# Patient Record
Sex: FEMALE | Race: WHITE | NOT HISPANIC OR LATINO | Employment: FULL TIME | URBAN - METROPOLITAN AREA
[De-identification: names, ages, dates, MRNs, and addresses within clinical notes are randomized per-mention and may not be internally consistent; named-entity substitution may affect disease eponyms.]

---

## 2017-01-07 ENCOUNTER — RADIANT APPOINTMENT (OUTPATIENT)
Dept: MAMMOGRAPHY | Facility: CLINIC | Age: 47
End: 2017-01-07
Attending: PHYSICIAN ASSISTANT
Payer: COMMERCIAL

## 2017-01-07 DIAGNOSIS — Z12.31 ENCOUNTER FOR SCREENING MAMMOGRAM FOR BREAST CANCER: ICD-10-CM

## 2017-01-07 PROCEDURE — G0202 SCR MAMMO BI INCL CAD: HCPCS | Mod: TC

## 2017-10-25 ENCOUNTER — TELEPHONE (OUTPATIENT)
Dept: FAMILY MEDICINE | Facility: CLINIC | Age: 47
End: 2017-10-25

## 2017-10-25 ASSESSMENT — PATIENT HEALTH QUESTIONNAIRE - PHQ9
5. POOR APPETITE OR OVEREATING: NOT AT ALL
SUM OF ALL RESPONSES TO PHQ QUESTIONS 1-9: 0

## 2017-10-25 ASSESSMENT — ANXIETY QUESTIONNAIRES
2. NOT BEING ABLE TO STOP OR CONTROL WORRYING: NOT AT ALL
1. FEELING NERVOUS, ANXIOUS, OR ON EDGE: NOT AT ALL
3. WORRYING TOO MUCH ABOUT DIFFERENT THINGS: NOT AT ALL
IF YOU CHECKED OFF ANY PROBLEMS ON THIS QUESTIONNAIRE, HOW DIFFICULT HAVE THESE PROBLEMS MADE IT FOR YOU TO DO YOUR WORK, TAKE CARE OF THINGS AT HOME, OR GET ALONG WITH OTHER PEOPLE: NOT DIFFICULT AT ALL
6. BECOMING EASILY ANNOYED OR IRRITABLE: NOT AT ALL
5. BEING SO RESTLESS THAT IT IS HARD TO SIT STILL: NOT AT ALL
7. FEELING AFRAID AS IF SOMETHING AWFUL MIGHT HAPPEN: NOT AT ALL
GAD7 TOTAL SCORE: 0

## 2017-10-26 ASSESSMENT — ANXIETY QUESTIONNAIRES: GAD7 TOTAL SCORE: 0

## 2018-03-02 ENCOUNTER — OFFICE VISIT (OUTPATIENT)
Dept: URGENT CARE | Facility: URGENT CARE | Age: 48
End: 2018-03-02
Payer: COMMERCIAL

## 2018-03-02 VITALS
TEMPERATURE: 98.5 F | DIASTOLIC BLOOD PRESSURE: 83 MMHG | HEART RATE: 68 BPM | BODY MASS INDEX: 28.37 KG/M2 | SYSTOLIC BLOOD PRESSURE: 132 MMHG | OXYGEN SATURATION: 97 % | WEIGHT: 186.6 LBS

## 2018-03-02 DIAGNOSIS — J06.9 VIRAL URI: Primary | ICD-10-CM

## 2018-03-02 DIAGNOSIS — R07.0 THROAT PAIN: ICD-10-CM

## 2018-03-02 LAB
DEPRECATED S PYO AG THROAT QL EIA: NORMAL
SPECIMEN SOURCE: NORMAL

## 2018-03-02 PROCEDURE — 87081 CULTURE SCREEN ONLY: CPT | Performed by: FAMILY MEDICINE

## 2018-03-02 PROCEDURE — 87880 STREP A ASSAY W/OPTIC: CPT | Performed by: FAMILY MEDICINE

## 2018-03-02 PROCEDURE — 99213 OFFICE O/P EST LOW 20 MIN: CPT | Performed by: FAMILY MEDICINE

## 2018-03-02 NOTE — MR AVS SNAPSHOT
After Visit Summary   3/2/2018    Neeta Verde    MRN: [id number]           Patient Information     Date Of Birth          1970        Visit Information        Provider Department      3/2/2018 7:10 PM Kurt Bianchi MD [facility]        Today's Diagnoses     Viral URI    -  1    Throat pain          Care Instructions      Viral Upper Respiratory Illness (Adult)  You have a viral upper respiratory illness (URI), which is another term for the common cold. This illness is contagious during the first few days. It is spread through the air by coughing and sneezing. It may also be spread by direct contact (touching the sick person and then touching your own eyes, nose, or mouth). Frequent handwashing will decrease risk of spread. Most viral illnesses go away within 7 to 10 days with rest and simple home remedies. Sometimes the illness may last for several weeks. Antibiotics will not kill a virus, and they are generally not prescribed for this condition.    Home care    If symptoms are severe, rest at home for the first 2 to 3 days. When you resume activity, don't let yourself get too tired.    Avoid being exposed to cigarette smoke (yours or others ).    You may use acetaminophen or ibuprofen to control pain and fever, unless another medicine was prescribed. (Note: If you have chronic liver or kidney disease, have ever had a stomach ulcer or gastrointestinal bleeding, or are taking blood-thinning medicines, talk with your healthcare provider before using these medicines.) Aspirin should never be given to anyone under 18 years of age who is ill with a viral infection or fever. It may cause severe liver or brain damage.    Your appetite may be poor, so a light diet is fine. Avoid dehydration by drinking 6 to 8 glasses of fluids per day (water, soft drinks, juices, tea, or soup). Extra fluids will help loosen secretions in the nose and lungs.    Over-the-counter cold medicines will not  shorten the length of time you re sick, but they may be helpful for the following symptoms: cough, sore throat, and nasal and sinus congestion. (Note: Do not use decongestants if you have high blood pressure.)  Follow-up care  Follow up with your healthcare provider, or as advised.  When to seek medical advice  Call your healthcare provider right away if any of these occur:    Cough with lots of colored sputum (mucus)    Severe headache; face, neck, or ear pain    Difficulty swallowing due to throat pain    Fever of 100.4 F (38 C)  Call 911, or get immediate medical care  Call emergency services right away if any of these occur:    Chest pain, shortness of breath, wheezing, or difficulty breathing    Coughing up blood    Inability to swallow due to throat pain  Date Last Reviewed: 9/13/2015 2000-2017 [employer]. [address] [place] [address]. All rights reserved. This information is not intended as a substitute for professional medical care. Always follow your healthcare professional's instructions.                Follow-ups after your visit        Who to contact     If you have questions or need follow up information about today's clinic visit or your schedule please contact [facility] directly at [phone].  Normal or non-critical lab and imaging results will be communicated to you by MyChart, letter or phone within 4 business days after the clinic has received the results. If you do not hear from us within 7 days, please contact the clinic through MyChart or phone. If you have a critical or abnormal lab result, we will notify you by phone as soon as possible.  Submit refill requests through [employer] or call your pharmacy and they will forward the refill request to us. Please allow 3 business days for your refill to be completed.          Additional Information About Your Visit        [employer] Information     [employer] gives you secure access to your electronic health  record. If you see a primary care provider, you can also send messages to your care team and make appointments. If you have questions, please call your primary care clinic.  If you do not have a primary care provider, please call [phone] and they will assist you.        Care EveryWhere ID     This is your Care EveryWhere ID. This could be used by other organizations to access your [facility] medical records  [id number]        Your Vitals Were     Pulse Temperature Last Period Pulse Oximetry BMI (Body Mass Index)       68 98.5  F (36.9  C) (Tympanic) 07/29/2015 (Exact Date) 97% 28.37 kg/m2        Blood Pressure from Last 3 Encounters:   03/02/18 132/83   10/25/17 120/82   12/19/16 104/80    Weight from Last 3 Encounters:   03/02/18 186 lb 9.6 oz (84.6 kg)   10/25/17 192 lb 3.2 oz (87.2 kg)   12/19/16 181 lb (82.1 kg)              We Performed the Following     Beta strep group A culture     Strep, Rapid Screen        Primary Care Provider Office Phone # Fax #    Jinny Letty Oswald [phone] [address] [facility] [address]        Equal Access to Services     JOAQUINA MONDRAGON : Hadii aad ku hadasho Soomaali, waaxda luqadaha, qaybta kaalmada adeegyada, waxay mikein haybentleyn deven ji. So [facility] [phone].    ATENCIÓN: Si habla español, tiene a matamoros disposición servicios gratuitos de asistencia lingüística. Llame al [phone].    We comply with applicable federal civil rights laws and [place] laws. We do not discriminate on the basis of race, color, national origin, age, disability, sex, sexual orientation, or gender identity.            Thank you!     Thank you for choosing [facility]  for your care. Our goal is always to provide you with excellent care. Hearing back from our patients is one way we can continue to improve our services. Please take a few minutes to complete the written survey that you may receive in the mail after your visit with us. Thank  you!             Your Updated Medication List - Protect others around you: Learn how to safely use, store and throw away your medicines at www.disposemymeds.org.          This list is accurate as of 3/2/18  7:39 PM.  Always use your most recent med list.                   Brand Name Dispense Instructions for use Diagnosis    escitalopram 10 MG tablet    LEXAPRO    30 tablet    Take 20 mg by mouth daily

## 2018-03-03 LAB
BACTERIA SPEC CULT: NORMAL
SPECIMEN SOURCE: NORMAL

## 2018-03-03 NOTE — PROGRESS NOTES
SUBJECTIVE:                                                    Neeta Verde is a 48 year old female who presents to clinic today for the following health issues:    RESPIRATORY SYMPTOMS      Duration: 2.5 days    Description  nasal congestion, rhinorrhea, sore throat, cough and diarrhea    Severity: mild    Accompanying signs and symptoms: None    History (predisposing factors):  none    Precipitating or alleviating factors: None    Therapies tried and outcome:  none        Problem list and histories reviewed & adjusted, as indicated.  Additional history: as documented    Patient Active Problem List   Diagnosis     Mild major depression (H)     Psychosis, brief reactive     CARDIOVASCULAR SCREENING; LDL GOAL LESS THAN 160     ASCUS favor benign     Past Surgical History:   Procedure Laterality Date     C APPENDECTOMY  1978       Social History   Substance Use Topics     Smoking status: Never Smoker     Smokeless tobacco: Never Used     Alcohol use 8.4 oz/week     Family History   Problem Relation Age of Onset     Adopted: Yes     Other - See Comments Son      chron's     Unknown/Adopted No family hx of      Unknown family history          Current Outpatient Prescriptions   Medication Sig Dispense Refill     escitalopram (LEXAPRO) 10 MG tablet Take 20 mg by mouth daily  30 tablet 1     Allergies   Allergen Reactions     Azithromycin      Zithromax - Racing heart     Moxifloxacin Hydrochloride      Avelox - Labored breathing     Recent Labs   Lab Test  12/19/16   1041  07/10/12   0854   LDL  142*  94   HDL  73  64   TRIG  91  62   ALT  26   --    CR  0.86   --    GFRESTIMATED  70   --    GFRESTBLACK  85   --    POTASSIUM  4.4   --       BP Readings from Last 3 Encounters:   03/02/18 132/83   10/25/17 120/82   12/19/16 104/80    Wt Readings from Last 3 Encounters:   03/02/18 186 lb 9.6 oz (84.6 kg)   10/25/17 192 lb 3.2 oz (87.2 kg)   12/19/16 181 lb (82.1 kg)                  Labs reviewed in  EPIC    ROS:  Constitutional, HEENT, cardiovascular, pulmonary, gi and gu systems are negative, except as otherwise noted.    OBJECTIVE:     /83  Pulse 68  Temp 98.5  F (36.9  C) (Tympanic)  Wt 186 lb 9.6 oz (84.6 kg)  LMP 07/29/2015 (Exact Date)  SpO2 97%  BMI 28.37 kg/m2  Body mass index is 28.37 kg/(m^2).  GENERAL: healthy, alert and no distress  EYES: Eyes grossly normal to inspection, PERRL and conjunctivae and sclerae normal  HENT: normal cephalic/atraumatic, ear canals and TM's normal, oropharxnx crowded, uvula elongated and sinuses: not tender  NECK: no adenopathy, no asymmetry, masses, or scars and thyroid normal to palpation  RESP: lungs clear to auscultation - no rales, rhonchi or wheezes  CV: regular rates and rhythm, normal S1 S2, no S3 or S4 and no murmur, click or rub  MS: no gross musculoskeletal defects noted, no edema    Diagnostic Test Results:  Results for orders placed or performed in visit on 03/02/18 (from the past 24 hour(s))   Strep, Rapid Screen   Result Value Ref Range    Specimen Description Throat     Rapid Strep A Screen       NEGATIVE: No Group A streptococcal antigen detected by immunoassay, await culture report.       ASSESSMENT/PLAN:         ICD-10-CM    1. Viral URI J06.9     B97.89    2. Throat pain R07.0 Strep, Rapid Screen     Beta strep group A culture       Discussed in detail differentials and further management. Symptoms are likely secondary to viral infection. Recommended well hydration, over-the-counter analgesia, warm fluids and saline gargles. Written instructions/information provided. Patient understood and in agreement with the above plan. All questions are answered. Follow-up if symptoms persist or worsen.        Patient Instructions     Viral Upper Respiratory Illness (Adult)  You have a viral upper respiratory illness (URI), which is another term for the common cold. This illness is contagious during the first few days. It is spread through the air by  coughing and sneezing. It may also be spread by direct contact (touching the sick person and then touching your own eyes, nose, or mouth). Frequent handwashing will decrease risk of spread. Most viral illnesses go away within 7 to 10 days with rest and simple home remedies. Sometimes the illness may last for several weeks. Antibiotics will not kill a virus, and they are generally not prescribed for this condition.    Home care    If symptoms are severe, rest at home for the first 2 to 3 days. When you resume activity, don't let yourself get too tired.    Avoid being exposed to cigarette smoke (yours or others ).    You may use acetaminophen or ibuprofen to control pain and fever, unless another medicine was prescribed. (Note: If you have chronic liver or kidney disease, have ever had a stomach ulcer or gastrointestinal bleeding, or are taking blood-thinning medicines, talk with your healthcare provider before using these medicines.) Aspirin should never be given to anyone under 18 years of age who is ill with a viral infection or fever. It may cause severe liver or brain damage.    Your appetite may be poor, so a light diet is fine. Avoid dehydration by drinking 6 to 8 glasses of fluids per day (water, soft drinks, juices, tea, or soup). Extra fluids will help loosen secretions in the nose and lungs.    Over-the-counter cold medicines will not shorten the length of time you re sick, but they may be helpful for the following symptoms: cough, sore throat, and nasal and sinus congestion. (Note: Do not use decongestants if you have high blood pressure.)  Follow-up care  Follow up with your healthcare provider, or as advised.  When to seek medical advice  Call your healthcare provider right away if any of these occur:    Cough with lots of colored sputum (mucus)    Severe headache; face, neck, or ear pain    Difficulty swallowing due to throat pain    Fever of 100.4 F (38 C)  Call 911, or get immediate medical care  Call  emergency services right away if any of these occur:    Chest pain, shortness of breath, wheezing, or difficulty breathing    Coughing up blood    Inability to swallow due to throat pain  Date Last Reviewed: 9/13/2015 2000-2017 [employer]. [address] [place] [address], [place] [address]. All rights reserved. This information is not intended as a substitute for professional medical care. Always follow your healthcare professional's instructions.            Kurt Bianchi MD  [facility]

## 2018-03-03 NOTE — PATIENT INSTRUCTIONS

## 2018-09-10 ENCOUNTER — OFFICE VISIT (OUTPATIENT)
Dept: FAMILY MEDICINE | Facility: CLINIC | Age: 48
End: 2018-09-10
Payer: COMMERCIAL

## 2018-09-10 VITALS
HEIGHT: 68 IN | SYSTOLIC BLOOD PRESSURE: 138 MMHG | HEART RATE: 59 BPM | TEMPERATURE: 98 F | OXYGEN SATURATION: 99 % | WEIGHT: 183 LBS | RESPIRATION RATE: 16 BRPM | BODY MASS INDEX: 27.74 KG/M2 | DIASTOLIC BLOOD PRESSURE: 86 MMHG

## 2018-09-10 DIAGNOSIS — Z12.11 SPECIAL SCREENING FOR MALIGNANT NEOPLASMS, COLON: ICD-10-CM

## 2018-09-10 DIAGNOSIS — F32.0 MILD MAJOR DEPRESSION (H): ICD-10-CM

## 2018-09-10 DIAGNOSIS — Z23 NEED FOR PROPHYLACTIC VACCINATION AND INOCULATION AGAINST INFLUENZA: ICD-10-CM

## 2018-09-10 DIAGNOSIS — Z12.31 VISIT FOR SCREENING MAMMOGRAM: ICD-10-CM

## 2018-09-10 DIAGNOSIS — Z00.00 ROUTINE GENERAL MEDICAL EXAMINATION AT A HEALTH CARE FACILITY: Primary | ICD-10-CM

## 2018-09-10 DIAGNOSIS — Z12.4 SCREENING FOR MALIGNANT NEOPLASM OF CERVIX: ICD-10-CM

## 2018-09-10 LAB
CHOLEST SERPL-MCNC: 206 MG/DL
GLUCOSE SERPL-MCNC: 82 MG/DL (ref 70–99)
HDLC SERPL-MCNC: 85 MG/DL
LDLC SERPL CALC-MCNC: 109 MG/DL
NONHDLC SERPL-MCNC: 121 MG/DL
TRIGL SERPL-MCNC: 62 MG/DL

## 2018-09-10 PROCEDURE — 36415 COLL VENOUS BLD VENIPUNCTURE: CPT | Performed by: PHYSICIAN ASSISTANT

## 2018-09-10 PROCEDURE — 90471 IMMUNIZATION ADMIN: CPT | Performed by: PHYSICIAN ASSISTANT

## 2018-09-10 PROCEDURE — 82947 ASSAY GLUCOSE BLOOD QUANT: CPT | Performed by: PHYSICIAN ASSISTANT

## 2018-09-10 PROCEDURE — G0145 SCR C/V CYTO,THINLAYER,RESCR: HCPCS | Performed by: PHYSICIAN ASSISTANT

## 2018-09-10 PROCEDURE — 87624 HPV HI-RISK TYP POOLED RSLT: CPT | Performed by: PHYSICIAN ASSISTANT

## 2018-09-10 PROCEDURE — 90686 IIV4 VACC NO PRSV 0.5 ML IM: CPT | Performed by: PHYSICIAN ASSISTANT

## 2018-09-10 PROCEDURE — 80061 LIPID PANEL: CPT | Performed by: PHYSICIAN ASSISTANT

## 2018-09-10 PROCEDURE — 99396 PREV VISIT EST AGE 40-64: CPT | Mod: 25 | Performed by: PHYSICIAN ASSISTANT

## 2018-09-10 RX ORDER — ESCITALOPRAM OXALATE 20 MG/1
TABLET ORAL
Refills: 3 | COMMUNITY
Start: 2018-09-04 | End: 2022-01-25

## 2018-09-10 ASSESSMENT — PAIN SCALES - GENERAL: PAINLEVEL: NO PAIN (0)

## 2018-09-10 NOTE — MR AVS SNAPSHOT
After Visit Summary   9/10/2018    Neeta Verde    MRN: [id number]           Patient Information     Date Of Birth          1970        Visit Information        Provider Department      9/10/2018 4:20 PM Kehr, Kristen M, PA-C [facility]        Today's Diagnoses     Routine general medical examination at a health care facility    -  1    Special screening for malignant neoplasms, colon        Visit for screening mammogram        Screening for malignant neoplasm of cervix          Care Instructions      Preventive Health Recommendations  Female Ages 50 - 64    Yearly exam: See your health care provider every year in order to  o Review health changes.   o Discuss preventive care.    o Review your medicines if your doctor has prescribed any.      Get a Pap test every three years (unless you have an abnormal result and your provider advises testing more often).    If you get Pap tests with HPV test, you only need to test every 5 years, unless you have an abnormal result.     You do not need a Pap test if your uterus was removed (hysterectomy) and you have not had cancer.    You should be tested each year for STDs (sexually transmitted diseases) if you're at risk.     Have a mammogram every 1 to 2 years.    Have a colonoscopy at age 50, or have a yearly FIT test (stool test). These exams screen for colon cancer.      Have a cholesterol test every 5 years, or more often if advised.    Have a diabetes test (fasting glucose) every three years. If you are at risk for diabetes, you should have this test more often.     If you are at risk for osteoporosis (brittle bone disease), think about having a bone density scan (DEXA).    Shots: Get a flu shot each year. Get a tetanus shot every 10 years.    Nutrition:     Eat at least 5 servings of fruits and vegetables each day.    Eat whole-grain bread, whole-wheat pasta and brown rice instead of white grains and rice.    Get adequate Calcium and  Vitamin D.     Lifestyle    Exercise at least 150 minutes a week (30 minutes a day, 5 days a week). This will help you control your weight and prevent disease.    Limit alcohol to one drink per day.    No smoking.     Wear sunscreen to prevent skin cancer.     See your dentist every six months for an exam and cleaning.    See your eye doctor every 1 to 2 years.            Follow-ups after your visit        Additional Services     GASTROENTEROLOGY ADULT REF PROCEDURE ONLY [facility] [phone]; [facility]       Last Lab Result: Creatinine (mg/dL)       Date                     Value                 12/19/2016               0.86             ----------  Body mass index is 28.24 kg/(m^2).      Patient will be contacted to schedule procedure.     Please be aware that coverage of these services is subject to the terms and limitations of your health insurance plan.  Call member services at your health plan with any benefit or coverage questions.  Any procedures must be performed at a [facility] facility OR coordinated by your clinic's referral office.    Please bring the following with you to your appointment:    (1) Any X-Rays, CTs or MRIs which have been performed.  Contact the facility where they were done to arrange for  prior to your scheduled appointment.    (2) List of current medications   (3) This referral request   (4) Any documents/labs given to you for this referral                  Future tests that were ordered for you today     Open Future Orders        Priority Expected Expires Ordered    *MA Screening Digital Bilateral Routine  9/10/2019 9/10/2018            Who to contact     If you have questions or need follow up information about today's clinic visit or your schedule please contact [facility] at [phone].  Normal or non-critical lab and imaging results will be communicated to you by MyChart, letter or phone within 4 business days after the clinic  "has received the results. If you do not hear from us within 7 days, please contact the clinic through [employer] or phone. If you have a critical or abnormal lab result, we will notify you by phone as soon as possible.  Submit refill requests through [employer] or call your pharmacy and they will forward the refill request to us. Please allow 3 business days for your refill to be completed.          Additional Information About Your Visit        [employer] Information     [employer] gives you secure access to your electronic health record. If you see a primary care provider, you can also send messages to your care team and make appointments. If you have questions, please call your primary care clinic.  If you do not have a primary care provider, please call [phone] and they will assist you.        Care EveryWhere ID     This is your Care EveryWhere ID. This could be used by other organizations to access your [facility] medical records  [id number]        Your Vitals Were     Pulse Temperature Respirations Height Last Period Pulse Oximetry    59 98  F (36.7  C) (Oral) 16 5' 7.5\" (1.715 m) 07/29/2015 (Exact Date) 99%    BMI (Body Mass Index)                   28.24 kg/m2            Blood Pressure from Last 3 Encounters:   09/10/18 138/86   03/02/18 132/83   10/25/17 120/82    Weight from Last 3 Encounters:   09/10/18 183 lb (83 kg)   03/02/18 186 lb 9.6 oz (84.6 kg)   10/25/17 192 lb 3.2 oz (87.2 kg)              We Performed the Following     GASTROENTEROLOGY ADULT REF PROCEDURE ONLY [facility] [phone]; [facility]     Glucose     HPV High Risk Types DNA Cervical     Lipid panel reflex to direct LDL Fasting     Pap imaged thin layer screen with HPV - recommended age 30 - 65          Today's Medication Changes          These changes are accurate as of 9/10/18  4:51 PM.  If you have any questions, ask your nurse or doctor.               These medicines have changed or have updated prescriptions.        " Dose/Directions    escitalopram 20 MG tablet   Commonly known as:  LEXAPRO   This may have changed:  Another medication with the same name was removed. Continue taking this medication, and follow the directions you see here.   Changed by:  Kehr, Kristen M, PA-C        Refills:  3                Primary Care Provider Office Phone # Fax #    Jinny Letty Oswald [phone] [address] [facility] [address]        Equal Access to Services     [facility]: Hadii aad ku hadasho Soomaali, waaxda luqadaha, qaybta kaalmada adeegyada, waxay idiin hayaan adeeg kharash la'aan . So [facility] [phone].    ATENCIÓN: Si habla español, tiene a matamoros disposición servicios gratuitos de asistencia lingüística. [facility] [phone].    We comply with applicable federal civil rights laws and [place] laws. We do not discriminate on the basis of race, color, national origin, age, disability, sex, sexual orientation, or gender identity.            Thank you!     Thank you for choosing [facility]  for your care. Our goal is always to provide you with excellent care. Hearing back from our patients is one way we can continue to improve our services. Please take a few minutes to complete the written survey that you may receive in the mail after your visit with us. Thank you!             Your Updated Medication List - Protect others around you: Learn how to safely use, store and throw away your medicines at www.disposemymeds.org.          This list is accurate as of 9/10/18  4:51 PM.  Always use your most recent med list.                   Brand Name Dispense Instructions for use Diagnosis    escitalopram 20 MG tablet    LEXAPRO

## 2018-09-10 NOTE — PROGRESS NOTES
SUBJECTIVE:   CC: Neeta Verde is an 48 year old woman who presents for preventive health visit.     Healthy Habits:    Do you get at least three servings of calcium containing foods daily (dairy, green leafy vegetables, etc.)? yes    Amount of exercise or daily activities, outside of work: 3 day(s) per week    Problems taking medications regularly No    Medication side effects: No    Have you had an eye exam in the past two years? yes    Do you see a dentist twice per year? yes    Do you have sleep apnea, excessive snoring or daytime drowsiness?          Today's PHQ-2 Score:   PHQ-2 ( 1999 Pfizer) 9/10/2018 10/25/2017   Q1: Little interest or pleasure in doing things 1 0   Q2: Feeling down, depressed or hopeless 0 0   PHQ-2 Score 1 0   Q1: Little interest or pleasure in doing things - Not at all   Q2: Feeling down, depressed or hopeless - Not at all   PHQ-2 Score - 0       Abuse: Current or Past(Physical, Sexual or Emotional)- No  Do you feel safe in your environment - Yes    Social History   Substance Use Topics     Smoking status: Never Smoker     Smokeless tobacco: Never Used     Alcohol use 8.4 oz/week     If you drink alcohol do you typically have >3 drinks per day or >7 drinks per week? No                     Reviewed orders with patient.  Reviewed health maintenance and updated orders accordingly - Yes  Labs reviewed in EPIC  BP Readings from Last 3 Encounters:   09/10/18 138/86   03/02/18 132/83   10/25/17 120/82    Wt Readings from Last 3 Encounters:   09/10/18 183 lb (83 kg)   03/02/18 186 lb 9.6 oz (84.6 kg)   10/25/17 192 lb 3.2 oz (87.2 kg)                  Patient Active Problem List   Diagnosis     Mild major depression (H)     Psychosis, brief reactive     CARDIOVASCULAR SCREENING; LDL GOAL LESS THAN 160     ASCUS favor benign     Past Surgical History:   Procedure Laterality Date     C APPENDECTOMY  1978       Social History   Substance Use Topics     Smoking status: Never Smoker     Smokeless  tobacco: Never Used     Alcohol use 8.4 oz/week     Family History   Problem Relation Age of Onset     Adopted: Yes     Other - See Comments Son      chron's     Crohn Disease Son      Allergy (Severe) Son      Unknown/Adopted No family hx of      Unknown family history          Current Outpatient Prescriptions   Medication Sig Dispense Refill     escitalopram (LEXAPRO) 20 MG tablet   3     Allergies   Allergen Reactions     Azithromycin      Zithromax - Racing heart     Moxifloxacin Hydrochloride      Avelox - Labored breathing       Patient under age 50, mutual decision reflected in health maintenance.      Pertinent mammograms are reviewed under the imaging tab.  History of abnormal Pap smear:  PAP / HPV Latest Ref Rng & Units 8/26/2015 7/10/2012   PAP - ASC-US(A) NIL   HPV 16 DNA NEG Negative -   HPV 18 DNA NEG Negative -   OTHER HR HPV NEG Negative -     Reviewed and updated as needed this visit by clinical staff  Tobacco  Allergies  Meds  Med Hx  Surg Hx  Fam Hx  Soc Hx        Reviewed and updated as needed this visit by Provider        Past Medical History:   Diagnosis Date     ASCUS favor benign 8/2015    Neg HPV     Depressive disorder       Past Surgical History:   Procedure Laterality Date     C APPENDECTOMY  1978       ROS:  CONSTITUTIONAL: NEGATIVE for fever, chills, change in weight  INTEGUMENTARY/SKIN: NEGATIVE for worrisome rashes, moles or lesions  EYES: NEGATIVE for vision changes or irritation  ENT: NEGATIVE for ear, mouth and throat problems  RESP: NEGATIVE for significant cough or SOB  BREAST: NEGATIVE for masses, tenderness or discharge  CV: NEGATIVE for chest pain, palpitations or peripheral edema  GI: NEGATIVE for nausea, abdominal pain, heartburn, or change in bowel habits  : NEGATIVE for unusual urinary or vaginal symptoms. No vaginal bleeding.  MUSCULOSKELETAL: NEGATIVE for significant arthralgias or myalgia  NEURO: NEGATIVE for weakness, dizziness or paresthesias  ENDOCRINE:  "NEGATIVE for temperature intolerance, skin/hair changes  PSYCHIATRIC: NEGATIVE for changes in mood or affect     OBJECTIVE:   /86  Pulse 59  Temp 98  F (36.7  C) (Oral)  Resp 16  Ht 5' 7.5\" (1.715 m)  Wt 183 lb (83 kg)  LMP 07/29/2015 (Exact Date)  SpO2 99%  BMI 28.24 kg/m2  EXAM:  GENERAL: healthy, alert and no distress  EYES: Eyes grossly normal to inspection, PERRL and conjunctivae and sclerae normal  HENT: ear canals and TM's normal, nose and mouth without ulcers or lesions  NECK: no adenopathy, no asymmetry, masses, or scars and thyroid normal to palpation  RESP: lungs clear to auscultation - no rales, rhonchi or wheezes  BREAST: normal without masses, tenderness or nipple discharge and no palpable axillary masses or adenopathy  CV: regular rate and rhythm, normal S1 S2, no S3 or S4, no murmur, click or rub, no peripheral edema and peripheral pulses strong  ABDOMEN: soft, nontender, no hepatosplenomegaly, no masses and bowel sounds normal   (female): normal female external genitalia, normal urethral meatus, vaginal mucosa pink, moist, well rugated, and normal cervix/adnexa/uterus without masses or discharge  MS: no gross musculoskeletal defects noted, no edema  SKIN: no suspicious lesions or rashes  NEURO: Normal strength and tone, mentation intact and speech normal  PSYCH: mentation appears normal, affect normal/bright    Diagnostic Test Results:  none     ASSESSMENT/PLAN:   1. Routine general medical examination at a health care facility  Health maintenance reviewed and updated.  - Glucose  - Lipid panel reflex to direct LDL Fasting    2. Special screening for malignant neoplasms, colon  Due for colon screening  - GASTROENTEROLOGY ADULT REF PROCEDURE ONLY [facility] [phone]; [facility]    3. Visit for screening mammogram  Scheduled mammogram  - *MA Screening Digital Bilateral; Future    4. Screening for malignant neoplasm of cervix  - Pap imaged thin layer screen with " "HPV - recommended age 30 - 65  - HPV High Risk Types DNA Cervical    5. Need for prophylactic vaccination and inoculation against influenza  - FLU VACCINE, SPLIT VIRUS, IM (QUADRIVALENT) [19837]- >3 YRS  - Vaccine Administration, Initial [45246]'    6. Mild major depression  Managed by Psychiatry and stable    COUNSELING:   Reviewed preventive health counseling, as reflected in patient instructions       Regular exercise       Healthy diet/nutrition       Colon cancer screening       (Patricia)menopause management    BP Readings from Last 1 Encounters:   09/10/18 138/86     Estimated body mass index is 28.24 kg/(m^2) as calculated from the following:    Height as of this encounter: 5' 7.5\" (1.715 m).    Weight as of this encounter: 183 lb (83 kg).    Weight management plan: Discussed healthy diet and exercise guidelines and patient will follow up in 12 months in clinic to re-evaluate.     reports that she has never smoked. She has never used smokeless tobacco.      Counseling Resources:  ATP IV Guidelines  Pooled Cohorts Equation Calculator  Breast Cancer Risk Calculator  FRAX Risk Assessment  ICSI Preventive Guidelines  Dietary Guidelines for Americans, 2010  USDA's MyPlate  ASA Prophylaxis  Lung CA Screening    Kristen M. Kehr, PA-C  [facility]    Injectable Influenza Immunization Documentation    1.  Is the person to be vaccinated sick today?   No    2. Does the person to be vaccinated have an allergy to a component   of the vaccine?   No  Egg Allergy Algorithm Link    3. Has the person to be vaccinated ever had a serious reaction   to influenza vaccine in the past?   No    4. Has the person to be vaccinated ever had Guillain-Barré syndrome?   No    Form completed by Ad DIOP MA           "

## 2018-09-10 NOTE — LETTER
My Depression Action Plan  Name: Neeta Verde   Date of Birth 1970  Date: 9/10/2018    My doctor: Jinny Oswald   My clinic: [facility]  [id number] [place] [id number] [phone]          GREEN    ZONE   Good Control    What it looks like:     Things are going generally well. You have normal up s and down s. You may even feel depressed from time to time, but bad moods usually last less than a day.   What you need to do:  1. Continue to care for yourself (see self care plan)  2. Check your depression survival kit and update it as needed  3. Follow your physician s recommendations including any medication.  4. Do not stop taking medication unless you consult with your physician first.           YELLOW         ZONE Getting Worse    What it looks like:     Depression is starting to interfere with your life.     It may be hard to get out of bed; you may be starting to isolate yourself from others.    Symptoms of depression are starting to last most all day and this has happened for several days.     You may have suicidal thoughts but they are not constant.   What you need to do:     1. Call your care team, your response to treatment will improve if you keep your care team informed of your progress. Yellow periods are signs an adjustment may need to be made.     2. Continue your self-care, even if you have to fake it!    3. Talk to someone in your support network    4. Open up your depression survival kit           RED    ZONE Medical Alert - Get Help    What it looks like:     Depression is seriously interfering with your life.     You may experience these or other symptoms: You can t get out of bed most days, can t work or engage in other necessary activities, you have trouble taking care of basic hygiene, or basic responsibilities, thoughts of suicide or death that will not go away, self-injurious behavior.     What you need to do:  1. Call your care team and  request a same-day appointment. If they are not available (weekends or after hours) call your local crisis line, emergency room or 911.            Depression Self Care Plan / Survival Kit    Self-Care for Depression  Here s the deal. Your body and mind are really not as separate as most people think.  What you do and think affects how you feel and how you feel influences what you do and think. This means if you do things that people who feel good do, it will help you feel better.  Sometimes this is all it takes.  There is also a place for medication and therapy depending on how severe your depression is, so be sure to consult with your medical provider and/ or Behavioral Health Consultant if your symptoms are worsening or not improving.     In order to better manage my stress, I will:    Exercise  Get some form of exercise, every day. This will help reduce pain and release endorphins, the  feel good  chemicals in your brain. This is almost as good as taking antidepressants!  This is not the same as joining a gym and then never going! (they count on that by the way ) It can be as simple as just going for a walk or doing some gardening, anything that will get you moving.      Hygiene   Maintain good hygiene (Get out of bed in the morning, Make your bed, Brush your teeth, Take a shower, and Get dressed like you were going to work, even if you are unemployed).  If your clothes don't fit try to get ones that do.    Diet  I will strive to eat foods that are good for me, drink plenty of water, and avoid excessive sugar, caffeine, alcohol, and other mood-altering substances.  Some foods that are helpful in depression are: complex carbohydrates, B vitamins, flaxseed, fish or fish oil, fresh fruits and vegetables.    Psychotherapy  I agree to participate in Individual Therapy (if recommended).    Medication  If prescribed medications, I agree to take them.  Missing doses can result in serious side effects.  I understand that  drinking alcohol, or other illicit drug use, may cause potential side effects.  I will not stop my medication abruptly without first discussing it with my provider.    Staying Connected With Others  I will stay in touch with my friends, family members, and my primary care provider/team.    Use your imagination  Be creative.  We all have a creative side; it doesn t matter if it s oil painting, sand castles, or mud pies! This will also kick up the endorphins.    Witness Beauty  (AKA stop and smell the roses) Take a look outside, even in mid-winter. Notice colors, textures. Watch the squirrels and birds.     Service to others  Be of service to others.  There is always someone else in need.  By helping others we can  get out of ourselves  and remember the really important things.  This also provides opportunities for practicing all the other parts of the program.    Humor  Laugh and be silly!  Adjust your TV habits for less news and crime-drama and more comedy.    Control your stress  Try breathing deep, massage therapy, biofeedback, and meditation. Find time to relax each day.     My support system    Clinic Contact:  Phone number:    Contact 1:  Phone number:    Contact 2:  Phone number:    Gnosticist/:  Phone number:    Therapist:  Phone number:    Local crisis center:    Phone number:    Other community support:  Phone number:

## 2018-09-10 NOTE — NURSING NOTE
"Chief Complaint   Patient presents with     Physical       Initial /86  Pulse 59  Temp 98  F (36.7  C) (Oral)  Resp 16  Ht 5' 7.5\" (1.715 m)  Wt 183 lb (83 kg)  LMP 07/29/2015 (Exact Date)  SpO2 99%  BMI 28.24 kg/m2 Estimated body mass index is 28.24 kg/(m^2) as calculated from the following:    Height as of this encounter: 5' 7.5\" (1.715 m).    Weight as of this encounter: 183 lb (83 kg).  Medication Reconciliation: complete    KIMBERLY Madison MA    "

## 2018-09-13 LAB
COPATH REPORT: NORMAL
PAP: NORMAL

## 2018-09-14 ENCOUNTER — HEALTH MAINTENANCE LETTER (OUTPATIENT)
Age: 48
End: 2018-09-14

## 2018-09-17 LAB
FINAL DIAGNOSIS: NORMAL
HPV HR 12 DNA CVX QL NAA+PROBE: NEGATIVE
HPV16 DNA SPEC QL NAA+PROBE: NEGATIVE
HPV18 DNA SPEC QL NAA+PROBE: NEGATIVE
SPECIMEN DESCRIPTION: NORMAL
SPECIMEN SOURCE CVX/VAG CYTO: NORMAL

## 2019-03-16 DIAGNOSIS — Z12.31 VISIT FOR SCREENING MAMMOGRAM: ICD-10-CM

## 2019-03-16 PROCEDURE — 77067 SCR MAMMO BI INCL CAD: CPT | Mod: TC

## 2019-09-28 ENCOUNTER — HEALTH MAINTENANCE LETTER (OUTPATIENT)
Age: 49
End: 2019-09-28

## 2019-10-15 ENCOUNTER — TRANSFERRED RECORDS (OUTPATIENT)
Dept: HEALTH INFORMATION MANAGEMENT | Facility: CLINIC | Age: 49
End: 2019-10-15

## 2019-10-26 ENCOUNTER — HEALTH MAINTENANCE LETTER (OUTPATIENT)
Age: 49
End: 2019-10-26

## 2020-03-13 ENCOUNTER — VIRTUAL VISIT (OUTPATIENT)
Dept: FAMILY MEDICINE | Facility: OTHER | Age: 50
End: 2020-03-13

## 2020-03-13 NOTE — PROGRESS NOTES
"Date: 2020 08:59:33  Clinician: Erasmo Blilingsley  Clinician NPI: [id number]  Patient: Neeta Verde  Patient : 1970  Patient Address: [address] [place] [address], [place] [address]  Patient Phone: [phone]  Visit Protocol: URI  Patient Summary:  Neeta is a 50 year old ( : 1970 ) female who initiated a Visit for COVID-19 (Coronavirus) evaluation and screening. When asked the question \"Please sign me up to receive news, health information and promotions. \", Neeta responded \"No\".    Neeta states her symptoms started 1-2 days ago.   Her symptoms consist of a cough and nasal congestion. She is experiencing mild difficulty breathing with activities but can speak normally in full sentences.   Symptom details     Nasal secretions: The color of her mucus is clear.    Cough: Neeta coughs a few times an hour and her cough is not more bothersome at night. Phlegm comes into her throat when she coughs. She believes her cough is caused by post-nasal drip. The color of the phlegm is white and clear.      Neeta denies having rhinitis, wheezing, ear pain, malaise, sore throat, fever, headache, teeth pain, chills, facial pain or pressure, and myalgias. She also denies taking antibiotic medication for the symptoms and having recent facial or sinus surgery in the past 60 days.   Precipitating events  She has not recently been exposed to someone with influenza. Neeta has been in close contact with the following high risk individuals: immunocompromised people.   Pertinent COVID-19 (Coronavirus) information  Neeta has not traveled internationally in the last 14 days before the start of her symptoms.   Neeta has not had close contact with a laboratory confirmed positive COVID-19 patient within 14 days of symptom onset.   Pertinent medical history  Neeta does not get yeast infections when she takes antibiotics.   Neeta does not need a return to work/school note.   Weight: 180 lbs   Neeta does not smoke or use smokeless " tobacco.   Additional information as reported by the patient (free text): Flew to [place], [place] from [facility] 2/22-2/29   Weight: 180 lbs    MEDICATIONS: escitalopram oxalate oral, ALLERGIES: NKDA  Clinician Response:  Dear Neeta,  Based on the information provided, you have a viral upper respiratory infection, otherwise known as a cold. Symptoms vary from person to person, but can include sneezing, coughing, a runny nose, sore throat, and headache and range from mild to severe.  Unfortunately, there are no medications that can cure a cold, so treatment is focused on controlling symptoms as much as possible. Most people gradually feel better until symptoms are gone in 1-2 weeks.  Medication information  Because you have a viral infection, antibiotics will not help you get better. Treating a viral infection with antibiotics could actually make you feel worse.  Self care  The following tips will keep you as comfortable as possible while you recover:     Rest    Drink plenty of water and other liquids    Take a hot shower to loosen congestion    Take a spoonful of honey to reduce your cough     When to seek care  Please be seen in a clinic or urgent care if new symptoms develop, or symptoms become worse.  COVID-19 (Coronavirus) General Information  We understand it may be concerning to be ill with symptoms that overlap with COVID-19 (Coronavirus) symptoms. Below are some helpful information on COVID-19 (Coronavirus).  How can I protect myself and others from the COVID-19 (Coronavirus)?  Because there is currently no vaccine to prevent infection, the best way to protect yourself is to avoid being exposed to this virus. The CDC recommends the following additional steps:     Wash your hands often with soap and water for at least 20 seconds, especially after blowing your nose, coughing, or sneezing; going to the bathroom; and before eating or preparing food.  Use an alcohol-based hand  that contains at least 60 percent  alcohol if soap and water are not available.        Avoid touching your eyes, nose and mouth with unwashed hands.    Avoid close contact with people who are sick.    Stay home when you are sick.    Cover your cough or sneeze with a tissue, then throw the tissue in the trash.    Clean and disinfect frequently touched objects and surfaces.     You can help stop COVID-19 (Coronavirus) by knowing the signs and symptoms:     Fever    Cough    Shortness of breath     Contact your healthcare provider if   Develop symptoms   AND   Have been in close contact with a person known to have COVID-19 (Coronavirus) or live in or have recently traveled from an area with ongoing spread of COVID-19 (Coronavirus). Call ahead before you go to a doctor's office or emergency room. Tell them about your recent travel and your symptoms.   For the most up to date information, visit the CDC's website.  Steps to help prevent the spread of COVID-19 (Coronavirus) if you are sick  If you are sick with COVID-19 (Coronavirus) or suspect you are infected with the virus that causes COVID-19 (Coronavirus), follow the steps below to help prevent the disease from spreading&nbsp;to people in your home and community.     Stay home except to get medical care. Home isolation may be started in consultation with your healthcare clinician.    Separate yourself from other people and animals in your home.    Call ahead before visiting your doctor if you have a medical appointment.    Wear a facemask when you are around other people.    Cover your cough and sneezes.    Clean your hands often.    Avoid sharing personal household items.    Clean and disinfect frequently touched objects and surfaces everyday.    You will need to have someone drop off medications or household supplies (if needed) at your house without coming inside or in contact with you or others living in your house.    Monitor your symptoms and seek prompt medical care if your illness is worsening  "(e.g. Difficulty breathing).    Discontinue home isolation only in consultation with your healthcare provider.     For more detailed and up to date information on what to do if you are sick, visit this link: What to Do If You Are Sick With Coronavirus Disease 2019 (COVID-19).  Do I need to be tested for COVID-19 (Coronavirus)?     At this time, the limited number of tests available are controlled by the state and local health departments and are being reserved for more seriously ill patients, those with known exposure to confirmed patients, and those with recent travel (within 14 days) to countries with high rates of COVID-19 (Coronavirus).    Decisions on which patients receive testing will be based on the local spread of COVID-19 (Coronavirus) as well as the symptoms. Your healthcare provider will make the final decision on whether you should be tested.    In the meantime, if you have concerns that you may have been exposed, it is reasonable to practice \"social distancing.\"&nbsp; If you are ill with a cold or flu like illness, please monitor your symptoms and reach out to your healthcare provider if your symptoms worsen.    For more up to date information, visit this link: COVID-19 (Coronavirus) Frequently Asked Questions and Answers.      Diagnosis: Viral URI  Diagnosis ICD: J06.9  Additional Clinician Notes: Your symptoms are not consistent with Coronavirus and are not recommended to be tested. your symptoms are consistent with a viral cold illness. I added the general covid information as a FYI.  "

## 2020-03-25 ENCOUNTER — MYC MEDICAL ADVICE (OUTPATIENT)
Dept: FAMILY MEDICINE | Facility: CLINIC | Age: 50
End: 2020-03-25

## 2020-03-25 NOTE — TELEPHONE ENCOUNTER
To provider to advise  Do you want patient to do another oncare visit or do an E-visit?      Michelle BRAYN, RN, CPN

## 2020-05-06 ENCOUNTER — MYC MEDICAL ADVICE (OUTPATIENT)
Dept: FAMILY MEDICINE | Facility: CLINIC | Age: 50
End: 2020-05-06

## 2020-05-06 NOTE — PROGRESS NOTES
Subjective     Neeta Verde is a 50 year old female who presents to clinic today for the following health issues:    HPI   Concern - Poss lump on Right Breast  Onset: noted yesterday     Description:   Possible lump, she noticed it this week. She has been wearing different bras at home since she is working from home more. She has pain in the right breast and feeling changes within the breast tissue.     Intensity: mild    Progression of Symptoms:  same    Accompanying Signs & Symptoms:  None     Previous history of similar problem:   None     Precipitating factors:   Worsened by: Tender to touch    Alleviating factors:  Improved by: n/a     Therapies Tried and outcome:       Patient Active Problem List   Diagnosis     Mild major depression (H)     Psychosis, brief reactive (H)     CARDIOVASCULAR SCREENING; LDL GOAL LESS THAN 160     ASCUS of cervix with negative high risk HPV     Past Surgical History:   Procedure Laterality Date     C APPENDECTOMY  1978       Social History     Tobacco Use     Smoking status: Never Smoker     Smokeless tobacco: Never Used   Substance Use Topics     Alcohol use: Yes     Alcohol/week: 14.0 standard drinks     Family History   Adopted: Yes   Problem Relation Age of Onset     Other - See Comments Son         chron's     Crohn's Disease Son      Allergy (Severe) Son      Unknown/Adopted No family hx of         Unknown family history          Current Outpatient Medications   Medication Sig Dispense Refill     escitalopram (LEXAPRO) 20 MG tablet   3     albuterol (PROAIR HFA/PROVENTIL HFA/VENTOLIN HFA) 108 (90 Base) MCG/ACT inhaler Inhale 2 puffs into the lungs every 4 hours as needed for wheezing 1 Inhaler 1     Allergies   Allergen Reactions     Azithromycin      Zithromax - Racing heart     Moxifloxacin Hydrochloride      Avelox - Labored breathing     BP Readings from Last 3 Encounters:   05/07/20 124/82   09/10/18 138/86   03/02/18 132/83    Wt Readings from Last 3 Encounters:  "  05/07/20 83.3 kg (183 lb 9.6 oz)   09/10/18 83 kg (183 lb)   03/02/18 84.6 kg (186 lb 9.6 oz)                    Reviewed and updated as needed this visit by Provider  Tobacco  Allergies  Meds  Problems  Med Hx  Surg Hx  Fam Hx         Review of Systems   ROS COMP: Constitutional, HEENT, cardiovascular, pulmonary, GI, , musculoskeletal, neuro, skin, endocrine and psych systems are negative, except as otherwise noted.      Objective    /82   Pulse 83   Temp 97.2  F (36.2  C) (Tympanic)   Resp 16   Ht 1.727 m (5' 8\")   Wt 83.3 kg (183 lb 9.6 oz)   LMP 07/29/2015 (Exact Date)   SpO2 98%   BMI 27.92 kg/m    Body mass index is 27.92 kg/m .  Physical Exam   GENERAL: healthy, alert and no distress  BREAST: Right breast / tenderness near the areola / aprox 11 0' clock within the breast tissue / course changes palpable, no definable mass / cyst.  Left breast: normal without masses, tenderness or nipple discharge and no palpable axillary masses or adenopathy bilaterally  MS: no gross musculoskeletal defects noted, no edema  SKIN: no suspicious lesions or rashes  PSYCH: mentation appears normal, affect normal/bright    Diagnostic Test Results:  Labs reviewed in Epic        Assessment & Plan     1. Breast pain, right  She is also due for annual mammogram screening. It was cancelled due to COVID19.   I am [facility] to the new right sided breast pain.   - MA Diagnostic Digital Bilateral; Future             Return in about 6 months (around 11/7/2020) for Routine Visit.    Kristen M. Kehr, PA-C  [facility]    "

## 2020-05-06 NOTE — TELEPHONE ENCOUNTER
Last office visit with Kristen Kehr, PA-C or [facility] 9/20/18.  For breast lump, patient should be seen per Verbal Orders, Kristen Kehr, PA-C.  Call to patient, scheduled appointment for tomorrow with Kristen Kehr, PA-C.  Patient/parent verbalized understanding of instructions provided and agreed with the plan of care    Per protocol, will route encounter to be cosigned by provider for Verbal Orders.  Alexia Stinson RN

## 2020-05-07 ENCOUNTER — OFFICE VISIT (OUTPATIENT)
Dept: FAMILY MEDICINE | Facility: CLINIC | Age: 50
End: 2020-05-07
Payer: COMMERCIAL

## 2020-05-07 VITALS
RESPIRATION RATE: 16 BRPM | OXYGEN SATURATION: 98 % | TEMPERATURE: 97.2 F | DIASTOLIC BLOOD PRESSURE: 82 MMHG | WEIGHT: 183.6 LBS | HEIGHT: 68 IN | BODY MASS INDEX: 27.83 KG/M2 | HEART RATE: 83 BPM | SYSTOLIC BLOOD PRESSURE: 124 MMHG

## 2020-05-07 DIAGNOSIS — N64.4 BREAST PAIN, RIGHT: Primary | ICD-10-CM

## 2020-05-07 PROCEDURE — 99213 OFFICE O/P EST LOW 20 MIN: CPT | Performed by: PHYSICIAN ASSISTANT

## 2020-05-07 ASSESSMENT — ANXIETY QUESTIONNAIRES
1. FEELING NERVOUS, ANXIOUS, OR ON EDGE: NOT AT ALL
IF YOU CHECKED OFF ANY PROBLEMS ON THIS QUESTIONNAIRE, HOW DIFFICULT HAVE THESE PROBLEMS MADE IT FOR YOU TO DO YOUR WORK, TAKE CARE OF THINGS AT HOME, OR GET ALONG WITH OTHER PEOPLE: NOT DIFFICULT AT ALL
6. BECOMING EASILY ANNOYED OR IRRITABLE: NOT AT ALL
7. FEELING AFRAID AS IF SOMETHING AWFUL MIGHT HAPPEN: NOT AT ALL
3. WORRYING TOO MUCH ABOUT DIFFERENT THINGS: NOT AT ALL
5. BEING SO RESTLESS THAT IT IS HARD TO SIT STILL: NOT AT ALL
GAD7 TOTAL SCORE: 1
2. NOT BEING ABLE TO STOP OR CONTROL WORRYING: NOT AT ALL

## 2020-05-07 ASSESSMENT — PATIENT HEALTH QUESTIONNAIRE - PHQ9
5. POOR APPETITE OR OVEREATING: SEVERAL DAYS
SUM OF ALL RESPONSES TO PHQ QUESTIONS 1-9: 0

## 2020-05-07 ASSESSMENT — MIFFLIN-ST. JEOR: SCORE: 1491.3

## 2020-05-07 NOTE — NURSING NOTE
"Chief Complaint   Patient presents with     Breast Mass     Poss right breast lump     Health Maintenance     PHQ9        Initial BP (!) 126/92   Pulse 83   Temp 97.2  F (36.2  C) (Tympanic)   Resp 16   Ht 1.727 m (5' 8\")   Wt 83.3 kg (183 lb 9.6 oz)   LMP 07/29/2015 (Exact Date)   SpO2 98%   BMI 27.92 kg/m   Estimated body mass index is 27.92 kg/m  as calculated from the following:    Height as of this encounter: 1.727 m (5' 8\").    Weight as of this encounter: 83.3 kg (183 lb 9.6 oz).  Medication Reconciliation: complete  Tam Castorena CMA    "

## 2020-05-08 ENCOUNTER — MYC MEDICAL ADVICE (OUTPATIENT)
Dept: FAMILY MEDICINE | Facility: CLINIC | Age: 50
End: 2020-05-08

## 2020-05-08 ASSESSMENT — ANXIETY QUESTIONNAIRES: GAD7 TOTAL SCORE: 1

## 2020-05-08 NOTE — TELEPHONE ENCOUNTER
Lm for patient to call us back. CD Ready can  if screening questions are negative. CD placed up front.

## 2020-05-12 ENCOUNTER — TRANSFERRED RECORDS (OUTPATIENT)
Dept: HEALTH INFORMATION MANAGEMENT | Facility: CLINIC | Age: 50
End: 2020-05-12

## 2020-10-15 ENCOUNTER — TRANSFERRED RECORDS (OUTPATIENT)
Dept: HEALTH INFORMATION MANAGEMENT | Facility: CLINIC | Age: 50
End: 2020-10-15

## 2020-12-30 ENCOUNTER — E-VISIT (OUTPATIENT)
Dept: URGENT CARE | Facility: URGENT CARE | Age: 50
End: 2020-12-30
Payer: COMMERCIAL

## 2020-12-30 ENCOUNTER — VIRTUAL VISIT (OUTPATIENT)
Dept: ALLERGY | Facility: OTHER | Age: 50
End: 2020-12-30
Payer: COMMERCIAL

## 2020-12-30 ENCOUNTER — NURSE TRIAGE (OUTPATIENT)
Dept: NURSING | Facility: CLINIC | Age: 50
End: 2020-12-30

## 2020-12-30 ENCOUNTER — HOSPITAL ENCOUNTER (EMERGENCY)
Facility: CLINIC | Age: 50
Discharge: HOME | End: 2020-12-30
Attending: EMERGENCY MEDICINE | Admitting: EMERGENCY MEDICINE
Payer: COMMERCIAL

## 2020-12-30 ENCOUNTER — APPOINTMENT (OUTPATIENT)
Dept: GENERAL RADIOLOGY | Facility: CLINIC | Age: 50
End: 2020-12-30
Attending: EMERGENCY MEDICINE
Payer: COMMERCIAL

## 2020-12-30 VITALS
RESPIRATION RATE: 20 BRPM | OXYGEN SATURATION: 100 % | TEMPERATURE: 98.6 F | WEIGHT: 183 LBS | DIASTOLIC BLOOD PRESSURE: 96 MMHG | SYSTOLIC BLOOD PRESSURE: 158 MMHG | BODY MASS INDEX: 27.83 KG/M2 | HEART RATE: 67 BPM

## 2020-12-30 DIAGNOSIS — R06.02 SHORTNESS OF BREATH: Primary | ICD-10-CM

## 2020-12-30 DIAGNOSIS — R06.02 SOB (SHORTNESS OF BREATH): Primary | ICD-10-CM

## 2020-12-30 DIAGNOSIS — T78.40XA ALLERGIC REACTION, INITIAL ENCOUNTER: ICD-10-CM

## 2020-12-30 DIAGNOSIS — J31.0 RHINOCONJUNCTIVITIS: ICD-10-CM

## 2020-12-30 DIAGNOSIS — H10.9 RHINOCONJUNCTIVITIS: ICD-10-CM

## 2020-12-30 LAB
ANION GAP SERPL CALCULATED.3IONS-SCNC: 4 MMOL/L (ref 3–14)
BASOPHILS # BLD AUTO: 0 10E9/L (ref 0–0.2)
BASOPHILS NFR BLD AUTO: 0.7 %
BUN SERPL-MCNC: 16 MG/DL (ref 7–30)
CALCIUM SERPL-MCNC: 9.2 MG/DL (ref 8.5–10.1)
CHLORIDE SERPL-SCNC: 105 MMOL/L (ref 94–109)
CO2 SERPL-SCNC: 30 MMOL/L (ref 20–32)
CREAT SERPL-MCNC: 0.7 MG/DL (ref 0.52–1.04)
CRP SERPL-MCNC: <2.9 MG/L (ref 0–8)
DIFFERENTIAL METHOD BLD: NORMAL
EOSINOPHIL NFR BLD AUTO: 2.2 %
ERYTHROCYTE [DISTWIDTH] IN BLOOD BY AUTOMATED COUNT: 12.2 % (ref 10–15)
FLUABV+SARS-COV-2+RSV PNL RESP NAA+PROBE: NEGATIVE
FLUABV+SARS-COV-2+RSV PNL RESP NAA+PROBE: NEGATIVE
GFR SERPL CREATININE-BSD FRML MDRD: >90 ML/MIN/{1.73_M2}
GLUCOSE SERPL-MCNC: 94 MG/DL (ref 70–99)
HCT VFR BLD AUTO: 42 % (ref 35–47)
HGB BLD-MCNC: 14 G/DL (ref 11.7–15.7)
IMM GRANULOCYTES # BLD: 0 10E9/L (ref 0–0.4)
IMM GRANULOCYTES NFR BLD: 0.2 %
LABORATORY COMMENT REPORT: NORMAL
LYMPHOCYTES # BLD AUTO: 1.6 10E9/L (ref 0.8–5.3)
LYMPHOCYTES NFR BLD AUTO: 29.8 %
MCH RBC QN AUTO: 32.4 PG (ref 26.5–33)
MCHC RBC AUTO-ENTMCNC: 33.3 G/DL (ref 31.5–36.5)
MCV RBC AUTO: 97 FL (ref 78–100)
MONOCYTES # BLD AUTO: 0.6 10E9/L (ref 0–1.3)
MONOCYTES NFR BLD AUTO: 11 %
NEUTROPHILS # BLD AUTO: 3 10E9/L (ref 1.6–8.3)
NEUTROPHILS NFR BLD AUTO: 56.1 %
NRBC # BLD AUTO: 0 10*3/UL
NRBC BLD AUTO-RTO: 0 /100
PLATELET # BLD AUTO: 238 10E9/L (ref 150–450)
POTASSIUM SERPL-SCNC: 4.1 MMOL/L (ref 3.4–5.3)
RBC # BLD AUTO: 4.32 10E12/L (ref 3.8–5.2)
RSV RNA SPEC QL NAA+PROBE: NORMAL
SARS-COV-2 RNA SPEC QL NAA+PROBE: NEGATIVE
SODIUM SERPL-SCNC: 139 MMOL/L (ref 133–144)
SPECIMEN SOURCE: NORMAL
WBC # BLD AUTO: 5.4 10E9/L (ref 4–11)

## 2020-12-30 PROCEDURE — 80048 BASIC METABOLIC PNL TOTAL CA: CPT | Performed by: EMERGENCY MEDICINE

## 2020-12-30 PROCEDURE — 99284 EMERGENCY DEPT VISIT MOD MDM: CPT | Mod: 25 | Performed by: EMERGENCY MEDICINE

## 2020-12-30 PROCEDURE — 99284 EMERGENCY DEPT VISIT MOD MDM: CPT | Performed by: EMERGENCY MEDICINE

## 2020-12-30 PROCEDURE — 85025 COMPLETE CBC W/AUTO DIFF WBC: CPT | Performed by: EMERGENCY MEDICINE

## 2020-12-30 PROCEDURE — 250N000009 HC RX 250: Performed by: EMERGENCY MEDICINE

## 2020-12-30 PROCEDURE — 87636 SARSCOV2 & INF A&B AMP PRB: CPT | Performed by: EMERGENCY MEDICINE

## 2020-12-30 PROCEDURE — 86140 C-REACTIVE PROTEIN: CPT | Performed by: EMERGENCY MEDICINE

## 2020-12-30 PROCEDURE — 71045 X-RAY EXAM CHEST 1 VIEW: CPT

## 2020-12-30 PROCEDURE — 94640 AIRWAY INHALATION TREATMENT: CPT

## 2020-12-30 PROCEDURE — 99203 OFFICE O/P NEW LOW 30 MIN: CPT | Mod: 95 | Performed by: ALLERGY & IMMUNOLOGY

## 2020-12-30 PROCEDURE — C9803 HOPD COVID-19 SPEC COLLECT: HCPCS | Performed by: EMERGENCY MEDICINE

## 2020-12-30 RX ORDER — FLUTICASONE PROPIONATE 50 MCG
2 SPRAY, SUSPENSION (ML) NASAL DAILY
Qty: 16 G | Refills: 3 | Status: SHIPPED | OUTPATIENT
Start: 2020-12-30 | End: 2022-08-23

## 2020-12-30 RX ORDER — LORATADINE 10 MG/1
10 TABLET ORAL DAILY
COMMUNITY
End: 2021-06-15

## 2020-12-30 RX ORDER — IPRATROPIUM BROMIDE AND ALBUTEROL SULFATE 2.5; .5 MG/3ML; MG/3ML
1 SOLUTION RESPIRATORY (INHALATION) EVERY 4 HOURS PRN
Qty: 42 VIAL | Refills: 0 | Status: SHIPPED | OUTPATIENT
Start: 2020-12-30 | End: 2022-01-25

## 2020-12-30 RX ORDER — IPRATROPIUM BROMIDE AND ALBUTEROL SULFATE 2.5; .5 MG/3ML; MG/3ML
3 SOLUTION RESPIRATORY (INHALATION) ONCE
Status: COMPLETED | OUTPATIENT
Start: 2020-12-30 | End: 2020-12-30

## 2020-12-30 RX ORDER — METHYLPREDNISOLONE 4 MG
TABLET, DOSE PACK ORAL
Qty: 21 TABLET | Refills: 0 | Status: SHIPPED | OUTPATIENT
Start: 2020-12-30 | End: 2021-06-15

## 2020-12-30 RX ORDER — DEXAMETHASONE SODIUM PHOSPHATE 10 MG/ML
6 INJECTION, SOLUTION INTRAMUSCULAR; INTRAVENOUS ONCE
Status: COMPLETED | OUTPATIENT
Start: 2020-12-30 | End: 2020-12-30

## 2020-12-30 RX ADMIN — IPRATROPIUM BROMIDE AND ALBUTEROL SULFATE 3 ML: .5; 3 SOLUTION RESPIRATORY (INHALATION) at 11:36

## 2020-12-30 RX ADMIN — DEXAMETHASONE SODIUM PHOSPHATE 6 MG: 10 INJECTION, SOLUTION INTRAMUSCULAR; INTRAVENOUS at 11:35

## 2020-12-30 NOTE — LETTER
"    12/30/2020         RE: Neeta Verde  [address]  [place] [address]        Dear Colleague,    Thank you for referring your patient, Neeta Verde, to the [facility]. Please see a copy of my visit note below.    Neeta Vrede is a 50 year old female who is being evaluated via a billable video visit.      The patient has been notified of following:      \"This video visit will be conducted via a call between you and your physician/provider. We have found that certain health care needs can be provided without the need for an in-person physical exam.  This service lets us provide the care you need with a video conversation.  If a prescription is necessary we can send it directly to your pharmacy.  If lab work is needed we can place an order for that and you can then stop by our lab to have the test done at a later time.     If during the course of the call the physician/provider feels a video visit is not appropriate, you will not be charged for this service.\"    Patient has given verbal consent for Video visit? Yes     Patient would like the video invitation sent by: [phone]    I have reviewed and updated the patient's Past Medical History, Social History, Family History and Medication List.    Seen in ER today. Negative testing for covid and influenza. Normal chest x-ray. Normal CBC, BMP, CRP.  Started on oral steroids. Using albuterol inhaler and nebulizer. I reviewed ER note, chest x-ray and labs.     Having cough that is chronic. Cough not present daily. Present for 10 years. Associated post nasal drip. Cough is possible worse in the spring. Wet cough that somewhat productive. No association with foods. Coughing at night daily. Treated with Allegra and/or Claritin. Not hugely helpful. Albuterol is historically helpful for cough. 1.5 years ago moved into an older home. Cough continued with move. Last week she started having chest tightness, shortness of breath, worsening cough. " Albuterol not helpful last few days.  Possible worse with cat exposure at [place]. Cats bother her from chest and nasal perspective. 2 dogs in home. No issues around dogs. Cold air can cause increased chest symptoms. Exertion possibly associated with chest symptoms. Rare use of albuterol. Previously on Advair and very helpful.     Ocular watering, post nasal drainage and ocular itching in the spring and fall. Since exposed to cat she has had congestion. Using antihistamine as needed.     ENVIRONMENTAL HISTORY: [place] is heated with a electric furnace, forced air and gas furnace. They do have central air conditioning. The patient's bedroom is furnished with feather/wool bedding or pillows, carpeting in bedroom, allergen mattress cover and fabric window coverings.  Pets inside the house include 2 dog(s). There is no history of cockroach or mice infestation. There is/are 0 smokers in the house.  The house does not have a damp basement.       Past Medical History:   Diagnosis Date     ASCUS favor benign 8/2015    Neg HPV     Depressive disorder      Family History   Adopted: Yes   Problem Relation Age of Onset     Other - See Comments Son         chron's     Crohn's Disease Son      Allergy (Severe) Son      Unknown/Adopted No family hx of         Unknown family history      Past Surgical History:   Procedure Laterality Date     C APPENDECTOMY  1978       REVIEW OF SYSTEMS:  General: negative for weight gain. negative for weight loss. negative for changes in sleep.   Ears: negative for fullness. negative for hearing loss. negative for dizziness.   Nose: negative for snoring.negative for changes in smell. positive  for drainage. Positive for congestion.  Eyes: negative for eye watering. negative for eye itching. negative for vision changes. negative for eye redness.  Throat: negative for hoarseness. negative for sore throat. negative for trouble swallowing.    Lungs: positive  for shortness of breath.negative for wheezing. positive  for sputum production. Positive for cough.  Cardiovascular: negative for chest pain. negative for swelling of ankles. negative for fast or irregular heartbeat.   Gastrointestinal: negative for nausea. negative for heartburn. negative for acid reflux.   Musculoskeletal: negative for joint pain. negative for joint stiffness. negative for joint swelling.   Neurologic: negative for seizures. negative for fainting. negative for weakness.   Psychiatric: negative for changes in mood. negative for anxiety.   Endocrine: negative for cold intolerance. negative for heat intolerance. negative for tremors.   Lymphatic: negative for lower extremity swelling. negative for lymph node swelling.   Hematologic: negative for easy bruising. negative for easy bleeding.  Integumentary: negative for rash. negative for scaling. negative for nail changes.       Current Outpatient Medications:      albuterol (PROAIR HFA/PROVENTIL HFA/VENTOLIN HFA) 108 (90 Base) MCG/ACT inhaler, Inhale 2 puffs into the lungs every 4 hours as needed for wheezing, Disp: 1 Inhaler, Rfl: 1     escitalopram (LEXAPRO) 20 MG tablet, , Disp: , Rfl: 3     fluticasone (FLONASE) 50 MCG/ACT nasal spray, Spray 2 sprays into both nostrils daily, Disp: 16 g, Rfl: 3     fluticasone-vilanterol (BREO ELLIPTA) 200-25 MCG/INH inhaler, Inhale 1 puff into the lungs daily, Disp: 1 Inhaler, Rfl: 3     ipratropium - albuterol 0.5 mg/2.5 mg/3 mL (DUONEB) 0.5-2.5 (3) MG/3ML neb solution, Take 1 vial (3 mLs) by nebulization every 4 hours as needed for shortness of breath / dyspnea or wheezing, Disp: 42 vial, Rfl: 0     loratadine (CLARITIN) 10 MG tablet, Take 10 mg by mouth daily, Disp: , Rfl:      methylPREDNISolone (MEDROL DOSEPAK) 4 MG tablet therapy pack, Follow Package Directions, Disp: 21 tablet, Rfl: 0  No current facility-administered medications for this visit.   Immunization History   Administered  Date(s) Administered     Influenza Vaccine IM > 6 months Valent IIV4 09/22/2016, 09/20/2017, 09/10/2018     Mantoux Tuberculin Skin Test 02/12/2014     TDAP Vaccine (Adacel) 06/12/2009     Allergies   Allergen Reactions     Azithromycin      Zithromax - Racing heart     Moxifloxacin Hydrochloride      Avelox - Labored breathing         EXAM:   Constitutional:  Appears well-developed and well-nourished. No distress.   HEENT:   Head: Normocephalic.   Neuro: Oriented to person, place, and time.  Skin: Skin is warm and dry. No rash noted.   Psychiatric: Normal mood and affect.     Nursing note and vitals reviewed.    ASSESSMENT/PLAN:  Problem List Items Addressed This Visit        Respiratory    Shortness of breath - Primary     Cough that is productive and has been present for years.  Albuterol is beneficial.  Recently with exposure to cats she has had shortness of breath, tightness in chest and coughing.  Seen today in ER.  Chest symptoms improved with nebulized albuterol.  Had been on Advair in the past and did significantly better on Advair.  Chest symptoms are likely asthma.  -Return to clinic for allergy testing.  Could not do today with flaring of chest symptoms and recent antihistamine use.  -Start Breo 200/25 mcg 1 puff inhaled daily.  -Albuterol 2 puffs inhaled every 4 hours as needed for coughing, wheezing, shortness of breath or tightness in chest.  -Albuterol nebulized treatment every 4 hours as needed for chest tightness, wheezing, coughing and/or shortness of breath.   -Reviewed spirometry from 2014.  Would consider repeating.  This was not a good effort in 2014.         Relevant Medications    loratadine (CLARITIN) 10 MG tablet    fluticasone-vilanterol (BREO ELLIPTA) 200-25 MCG/INH inhaler    fluticasone (FLONASE) 50 MCG/ACT nasal spray       Infectious/Inflammatory    Rhinoconjunctivitis     Ocular watering, postnasal drainage and itching in the spring and fall.  Nasal congestion recently with cat  exposure.  On antihistamine.    -Return to clinic for allergy testing.  Could not do today secondary to antihistamine use. Hold oral antihistamines.   -Start Flonase 2 sprays per nostril daily.         Relevant Medications    fluticasone (FLONASE) 50 MCG/ACT nasal spray          Chart documentation with Dragon Voice recognition Software. Although reviewed after completion, some words and grammatical errors may remain.    I have reviewed the note as documented above.  This accurately captures the substance of my conversation with the patient.    Video visit contact time  Video visit started at 1533  Video visit ended at 1555    Video-Visit Details     Type of service:  Video Visit     Originating Location (pt. Location): Home     Distant Location (provider location):  [facility]     Mode of Communication:  Video Conference via [facility]    Terrence Wyatt DO FAAAAI  Allergy/Immunology  [facility] and [place], [place]      Again, thank you for allowing me to participate in the care of your patient.        Sincerely,        Terrence Wyatt DO

## 2020-12-30 NOTE — ED TRIAGE NOTES
Patient arrives with shortness of breath since lance when she was at her inlaws who have a cat which she is allergic too. States that she has been using her albuterol inhaler but it has not been working today. Was short of breath just walking into the kitchen today.

## 2020-12-30 NOTE — PATIENT INSTRUCTIONS
Dear Neeta Verde,    We are sorry you are not feeling well. Based on the responses you provided, it is recommended that you be seen in-person in urgent care so we can better evaluate your symptoms. Please click here to find the nearest urgent care location to you.   You will not be charged for this Visit. Thank you for trusting us with your care.    Prasanth Kaur MD, MD    Dear Neeta Verde,    We are sorry you are not feeling well. Based on the responses you provided, you may be experiencing a serious health condition that needs immediate in-person attention. It is recommended that you be seen in the emergency room in order to better evaluate your symptoms. Please click here to find the nearest Emergency Room.     Prasanth Kaur MD, MD  Dear Neeta Verde,    We are sorry you are not feeling well. Based on the responses you provided, it is recommended that you be seen in-person in urgent care so we can better evaluate your symptoms. Please click here to find the nearest urgent care location to you.   You will not be charged for this Visit. Thank you for trusting us with your care.    Prasanth Kaur MD, MD

## 2020-12-30 NOTE — PATIENT INSTRUCTIONS
Allergy Staff Appt Hours Shot Hours Locations    Physician     Terrence Wyatt DO       Support Staff     ROSINA Weir CMA  Tuesday:        [place] 7-5 Wednesday:        [place]: 7-5 Thursday:                    [place] 7-6     Friday:  [place]  7-2   [place]        Thursday: 8-5:20        Friday: 7-12     [place]        Tuesday: 7- 3:20 Wednesday: 7-4:20 [place] Monday: 7-2:20 Tuesday: 9-5:20         [facility]  [address] [place] [address]  Appt Line: [phone]  Allergy RN:  [phone]    [facility]  [address] [place] [address]  Appt Line: [phone]  Allergy RN:  [phone]       Important Scheduling Information  Aspirin Desensitization: Appt will last 2 clinic days. Please call the Allergy RN line for your clinic to schedule. Discontinue antihistamines 7 days prior to the appointment.     Food Challenges: Appt will last 3-4 hours. Please call the Allergy RN line for your clinic to schedule. Discontinue antihistamines 7 days prior to the appointment.     Penicillin Testing: Appt will last 2-3 hours. Please call the Allergy RN line for your clinic to schedule. Discontinue antihistamines 7 days prior to the appointment.     Skin Testing: Appt will about 40 minutes. Call the appointment line for your clinic to schedule. Discontinue antihistamines 7 days prior to the appointment.     Venom Testing: Appt will last 2-3 hours. Please call the Allergy RN line for your clinic to schedule. Discontinue antihistamines 7 days prior to the appointment.     Thank you for trusting us with your Allergy, Asthma, and Immunology care. Please feel free to contact us with any questions or concerns you may have.      - Flonase 50mcg 2 sprays/nostril q day  - Breo 200/25mcg 1 puff inhaled daily.  - Albuterol 2-4 puffs inhaled (use a spacer unless using a Proair Respiclick device) every 4 hours as needed for chest tightness, wheezing,  shortness of breath and/or coughing.   - Albuterol nebulized treatment every 4 hours as needed for chest tightness, wheezing, coughing and/or shortness of breath.   - Stop antihistamines.   - Return for allergy testing.

## 2020-12-30 NOTE — DISCHARGE INSTRUCTIONS
Your chest x-ray and lab work looked normal your symptoms are likely due to a prolonged allergic reaction    You were given a dose of steroids in the ER today, and a prescription for steroid taper was sent to the pharmacy.  You may start the steroids tomorrow 12/31/2020.  Complete the dose as indicated on packaging    A nebulizer was also prescribed for you to use.  You may use this every 4-6 hours as needed if you have shortness of breath, cough, or wheeze.  If you are away from your home, you may continue to use the albuterol inhaler that was previously prescribed to you    You should also take an antihistamine medication daily, such as Allegra, Claritin, Zyrtec, or Xyzal.    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  You may follow-up with his office as needed    Follow up with your primary provider as needed.  Return to the ER if you have any new or worsening symptoms

## 2020-12-30 NOTE — TELEPHONE ENCOUNTER
Pt is calling.    Shortness of breath since Kb day. Allergic to cats and was at her parent's house with cats on 12/25/2020. Runny nose, sore throat. Using her albuterol inhaler. It was helping but not very long. Not helping at all today. Air purifier helps at night.   Tightness in her chest. Occasional cough. Wheezing as well. No fever, body aches, or chills.   Doing virtual visit with [facility] now.  Shortness of breath with activity. No shortness of breath at rest.  Care advice reviewed. I advised her to go into urgent care now for evaluation. She verbalized understanding and will go now.    COVID 19 Nurse Triage Plan/Patient Instructions    Please be aware that novel coronavirus (COVID-19) may be circulating in the community. If you develop symptoms such as fever, cough, or SOB or if you have concerns about the presence of another infection including coronavirus (COVID-19), please contact your health care provider or visit [email or web address].     Disposition/Instructions    In-Person Visit with provider recommended. Reference Visit Selection Guide.    Thank you for taking steps to prevent the spread of this virus.  o Limit your contact with others.  o Wear a simple mask to cover your cough.  o Wash your hands well and often.    Resources    [facility]: About COVID-19: [email or web address]    CDC: What to Do If You're Sick: www.cdc.gov/coronavirus/2019-ncov/about/steps-when-sick.html    CDC: Ending Home Isolation: www.cdc.gov/coronavirus/2019-ncov/hcp/disposition-in-home-patients.html     CDC: Caring for Someone: www.cdc.gov/coronavirus/2019-ncov/if-you-are-sick/care-for-someone.html     [facility]: Interim Guidance for Hospital Discharge to Home: www.[email or web address]/diseases/coronavirus/hcp/hospdischarge.pdf    [facility] clinical trials (COVID-19 research studies): [email or web address]     Below are the COVID-19 hotlines at the [facility]  "([facility]). Interpreters are available.   o For health questions: Call 364-910-7234 or 1-332.809.4490 (7 a.m. to 7 p.m.)  o For questions about schools and childcare: Call [phone] or 1-929.832.2100 (7 a.m. to 7 p.m.)     Reason for Disposition    MILD difficulty breathing (e.g., minimal/no SOB at rest, SOB with walking, pulse <100)    Additional Information    Negative: Breathing stopped and hasn't returned    Negative: Choking on something    Negative: SEVERE difficulty breathing (e.g., struggling for each breath, speaks in single words, pulse > 120)    Negative: Bluish (or gray) lips or face    Negative: Difficult to awaken or acting confused (e.g., disoriented, slurred speech)    Negative: Passed out (i.e., fainted, collapsed and was not responding)    Negative: Wheezing started suddenly after medicine, an allergic food, or bee sting    Negative: Stridor    Negative: Slow, shallow and weak breathing    Negative: Sounds like a life-threatening emergency to the triager    Negative: Chest pain    Negative: Wheezing (high pitched whistling sound) and previous asthma attacks or use of asthma medicines    Negative: Difficulty breathing and only present when coughing    Negative: Difficulty breathing and only from stuffy or runny nose    Negative: MODERATE difficulty breathing (e.g., speaks in phrases, SOB even at rest, pulse 100-120) of new onset or worse than normal    Negative: Wheezing can be heard across the room    Negative: Drooling or spitting out saliva (because can't swallow)    Negative: Any history of prior \"blood clot\" in leg or lungs    Negative: Recent illness requiring prolonged bedrest (i.e., immobilization)    Negative: Hip or leg fracture in past 2 months (e.g., or had cast on leg or ankle)    Negative: Major surgery in the past month    Negative: Recent long-distance travel with prolonged time in car, bus, plane, or train (i.e., within past 2 weeks; 6 or  more hours duration)    Negative: Extra heart " "beats OR irregular heart beating   (i.e., \"palpitations\")    Negative: Fever > 103 F (39.4 C)    Negative: Fever > 101 F (38.3 C) and over 60 years of age    Negative: Fever > 100.0 F (37.8 C) and bedridden (e.g., nursing home patient, stroke, chronic illness, recovering from surgery)    Negative: Fever > 100.0 F (37.8 C) and diabetes mellitus or weak immune system (e.g., HIV positive, cancer chemo, splenectomy, organ transplant, chronic steroids)    Negative: Periods where breathing stops and then resumes normally and bedridden (e.g., nursing home patient, CVA)    Negative: Pregnant or postpartum (from 0 to 6 weeks after delivery)    Negative: Patient sounds very sick or weak to the triager    MILD difficulty breathing (e.g., minimal/no SOB at rest, SOB with walking, pulse < 100) of new onset or worse than normal    Negative: SEVERE difficulty breathing (e.g., struggling for each breath, speaks in single words)    Negative: Difficult to awaken or acting confused (e.g., disoriented, slurred speech)    Negative: Bluish (or gray) lips or face now    Negative: Shock suspected (e.g., cold/pale/clammy skin, too weak to stand, low BP, rapid pulse)    Negative: Sounds like a life-threatening emergency to the triager    Negative: [1] COVID-19 exposure AND [2] no symptoms    Negative: [1] Lives with someone known to have influenza (flu test positive) AND [2] flu-like symptoms (e.g., cough, runny nose, sore throat, SOB; with or without fever)    Negative: [1] Adult with possible COVID-19 symptoms AND [2] triager concerned about severity of symptoms or other causes    Negative: Immunization reaction suspected (e.g., fever, headache, muscle aches occurring during days 1-3 days after immunization)    Negative: COVID-19 and breastfeeding, questions about    Negative: SEVERE or constant chest pain or pressure (Exception: mild central chest pain, present only when coughing)    Negative: MODERATE difficulty breathing (e.g., speaks " in phrases, SOB even at rest, pulse 100-120)    Negative: [1] Headache AND [2] stiff neck (can't touch chin to chest)    Protocols used: CORONAVIRUS (COVID-19) DIAGNOSED OR [id number]-L-YB 12.1, BREATHING DIFFICULTY-A-OH    Mary Carmen Deleon RN  [facility] Nurse Advisor  12/30/2020 at 10:10 AM

## 2020-12-30 NOTE — PROGRESS NOTES
"Neeta Verde is a 50 year old female who is being evaluated via a billable video visit.      The patient has been notified of following:      \"This video visit will be conducted via a call between you and your physician/provider. We have found that certain health care needs can be provided without the need for an in-person physical exam.  This service lets us provide the care you need with a video conversation.  If a prescription is necessary we can send it directly to your pharmacy.  If lab work is needed we can place an order for that and you can then stop by our lab to have the test done at a later time.     If during the course of the call the physician/provider feels a video visit is not appropriate, you will not be charged for this service.\"    Patient has given verbal consent for Video visit? Yes     Patient would like the video invitation sent by: [phone]    I have reviewed and updated the patient's Past Medical History, Social History, Family History and Medication List.    Seen in ER today. Negative testing for covid and influenza. Normal chest x-ray. Normal CBC, BMP, CRP.  Started on oral steroids. Using albuterol inhaler and nebulizer. I reviewed ER note, chest x-ray and labs.     Having cough that is chronic. Cough not present daily. Present for 10 years. Associated post nasal drip. Cough is possible worse in the spring. Wet cough that somewhat productive. No association with foods. Coughing at night daily. Treated with Allegra and/or Claritin. Not hugely helpful. Albuterol is historically helpful for cough. 1.5 years ago moved into an older home. Cough continued with move. Last week she started having chest tightness, shortness of breath, worsening cough. Albuterol not helpful last few days.  Possible worse with cat exposure at [place]. Cats bother her from chest and nasal perspective. 2 dogs in home. No issues around dogs. Cold air can cause increased chest symptoms. Exertion possibly " associated with chest symptoms. Rare use of albuterol. Previously on Advair and very helpful.     Ocular watering, post nasal drainage and ocular itching in the spring and fall. Since exposed to cat she has had congestion. Using antihistamine as needed.     ENVIRONMENTAL HISTORY: [place] The home is heated with a electric furnace, forced air and gas furnace. They do have central air conditioning. The patient's bedroom is furnished with feather/wool bedding or pillows, carpeting in bedroom, allergen mattress cover and fabric window coverings.  Pets inside the house include 2 dog(s). There is no history of cockroach or mice infestation. There is/are 0 smokers in the house.  The house does not have a damp basement.       Past Medical History:   Diagnosis Date     ASCUS favor benign 8/2015    Neg HPV     Depressive disorder      Family History   Adopted: Yes   Problem Relation Age of Onset     Other - See Comments Son         chron's     Crohn's Disease Son      Allergy (Severe) Son      Unknown/Adopted No family hx of         Unknown family history      Past Surgical History:   Procedure Laterality Date     C APPENDECTOMY  1978       REVIEW OF SYSTEMS:  General: negative for weight gain. negative for weight loss. negative for changes in sleep.   Ears: negative for fullness. negative for hearing loss. negative for dizziness.   Nose: negative for snoring.negative for changes in smell. positive  for drainage. Positive for congestion.  Eyes: negative for eye watering. negative for eye itching. negative for vision changes. negative for eye redness.  Throat: negative for hoarseness. negative for sore throat. negative for trouble swallowing.   Lungs: positive  for shortness of breath.negative for wheezing. positive  for sputum production. Positive for cough.  Cardiovascular: negative for chest pain. negative for swelling of ankles. negative for fast or irregular heartbeat.    Gastrointestinal: negative for nausea. negative for heartburn. negative for acid reflux.   Musculoskeletal: negative for joint pain. negative for joint stiffness. negative for joint swelling.   Neurologic: negative for seizures. negative for fainting. negative for weakness.   Psychiatric: negative for changes in mood. negative for anxiety.   Endocrine: negative for cold intolerance. negative for heat intolerance. negative for tremors.   Lymphatic: negative for lower extremity swelling. negative for lymph node swelling.   Hematologic: negative for easy bruising. negative for easy bleeding.  Integumentary: negative for rash. negative for scaling. negative for nail changes.       Current Outpatient Medications:      albuterol (PROAIR HFA/PROVENTIL HFA/VENTOLIN HFA) 108 (90 Base) MCG/ACT inhaler, Inhale 2 puffs into the lungs every 4 hours as needed for wheezing, Disp: 1 Inhaler, Rfl: 1     escitalopram (LEXAPRO) 20 MG tablet, , Disp: , Rfl: 3     fluticasone (FLONASE) 50 MCG/ACT nasal spray, Spray 2 sprays into both nostrils daily, Disp: 16 g, Rfl: 3     fluticasone-vilanterol (BREO ELLIPTA) 200-25 MCG/INH inhaler, Inhale 1 puff into the lungs daily, Disp: 1 Inhaler, Rfl: 3     ipratropium - albuterol 0.5 mg/2.5 mg/3 mL (DUONEB) 0.5-2.5 (3) MG/3ML neb solution, Take 1 vial (3 mLs) by nebulization every 4 hours as needed for shortness of breath / dyspnea or wheezing, Disp: 42 vial, Rfl: 0     loratadine (CLARITIN) 10 MG tablet, Take 10 mg by mouth daily, Disp: , Rfl:      methylPREDNISolone (MEDROL DOSEPAK) 4 MG tablet therapy pack, Follow Package Directions, Disp: 21 tablet, Rfl: 0  No current facility-administered medications for this visit.   Immunization History   Administered Date(s) Administered     Influenza Vaccine IM > 6 months Valent IIV4 09/22/2016, 09/20/2017, 09/10/2018     Mantoux Tuberculin Skin Test 02/12/2014     TDAP Vaccine (Adacel) 06/12/2009     Allergies   Allergen Reactions     Azithromycin       Zithromax - Racing heart     Moxifloxacin Hydrochloride      Avelox - Labored breathing         EXAM:   Constitutional:  Appears well-developed and well-nourished. No distress.   HEENT:   Head: Normocephalic.   Neuro: Oriented to person, place, and time.  Skin: Skin is warm and dry. No rash noted.   Psychiatric: Normal mood and affect.     Nursing note and vitals reviewed.    ASSESSMENT/PLAN:  Problem List Items Addressed This Visit        Respiratory    Shortness of breath - Primary     Cough that is productive and has been present for years.  Albuterol is beneficial.  Recently with exposure to cats she has had shortness of breath, tightness in chest and coughing.  Seen today in ER.  Chest symptoms improved with nebulized albuterol.  Had been on Advair in the past and did significantly better on Advair.  Chest symptoms are likely asthma.  -Return to clinic for allergy testing.  Could not do today with flaring of chest symptoms and recent antihistamine use.  -Start Breo 200/25 mcg 1 puff inhaled daily.  -Albuterol 2 puffs inhaled every 4 hours as needed for coughing, wheezing, shortness of breath or tightness in chest.  -Albuterol nebulized treatment every 4 hours as needed for chest tightness, wheezing, coughing and/or shortness of breath.   -Reviewed spirometry from 2014.  Would consider repeating.  This was not a good effort in 2014.         Relevant Medications    loratadine (CLARITIN) 10 MG tablet    fluticasone-vilanterol (BREO ELLIPTA) 200-25 MCG/INH inhaler    fluticasone (FLONASE) 50 MCG/ACT nasal spray       Infectious/Inflammatory    Rhinoconjunctivitis     Ocular watering, postnasal drainage and itching in the spring and fall.  Nasal congestion recently with cat exposure.  On antihistamine.    -Return to clinic for allergy testing.  Could not do today secondary to antihistamine use. Hold oral antihistamines.   -Start Flonase 2 sprays per nostril daily.         Relevant Medications    fluticasone  (FLONASE) 50 MCG/ACT nasal spray          Chart documentation with Dragon Voice recognition Software. Although reviewed after completion, some words and grammatical errors may remain.    I have reviewed the note as documented above.  This accurately captures the substance of my conversation with the patient.    Video visit contact time  Video visit started at 1533  Video visit ended at 1555    Video-Visit Details     Type of service:  Video Visit     Originating Location (pt. Location): Home     Distant Location (provider location):  [facility]     Mode of Communication:  Video Conference via [facility]    DO IAN CorderoI  Allergy/Immunology  [place], [place]

## 2020-12-30 NOTE — ED PROVIDER NOTES
History     Chief Complaint   Patient presents with     Shortness of Breath     HPI  Neeta Verde is a 50 year old female who presents to the ER with shortness of breath.  Was evaluated by [facility] virtual visit today for her concerns, was instructed to be seen in urgent care for her symptoms.  States that symptoms started on 12/25/2020.  She has a known allergy to cat hair and dander, and was over at family members house for the holiday where they do have cats.  She did try taking Allegra, Flonase, and her albuterol inhaler, but continued to have shortness of breath and coughing attacks.  Since she has come home, she continues to feel short of breath and occasionally have a cough.  Cough was initially dry and is now productive of clear sputum.  She feels short of breath with any amount of activity, feels very winded and needs to sit down to catch her breath.  If she is able to sit and rest she feels better.  Feels a tightness in the lower part of her chest, but denies any expressed chest pain.  Has not been running a fever.  No loss of taste or smell.  Denies any nausea or diarrhea.  No known sick contacts.  Is continue to try using her albuterol inhaler 2-3 times a day to help with the coughing and shortness of breath, and notices a brief improvement but symptoms always return.    Allergies:  Allergies   Allergen Reactions     Azithromycin      Zithromax - Racing heart     Moxifloxacin Hydrochloride      Avelox - Labored breathing       Problem List:    Patient Active Problem List    Diagnosis Date Noted     ASCUS of cervix with negative high risk HPV 08/26/2015     Priority: Medium     8/26/15: Pap - ASCUS, Neg HPV. Plan cotest in 3 years.   9/10/18 NIL Pap, Neg HPV. Plan cotest in 5 years.        CARDIOVASCULAR SCREENING; LDL GOAL LESS THAN 160 07/10/2012     Priority: Medium     Psychosis, brief reactive (H) 10/27/2009     Priority: Medium     Mild major depression (H) 07/03/2009     Priority: Medium         Past Medical History:    Past Medical History:   Diagnosis Date     ASCUS favor benign 8/2015     Depressive disorder        Past Surgical History:    Past Surgical History:   Procedure Laterality Date     C APPENDECTOMY  1978       Family History:    Family History   Adopted: Yes   Problem Relation Age of Onset     Other - See Comments Son         chron's     Crohn's Disease Son      Allergy (Severe) Son      Unknown/Adopted No family hx of         Unknown family history        Social History:  Marital Status:   [2]  Social History     Tobacco Use     Smoking status: Never Smoker     Smokeless tobacco: Never Used   Substance Use Topics     Alcohol use: Yes     Alcohol/week: 14.0 standard drinks     Drug use: No        Medications:         albuterol (PROAIR HFA/PROVENTIL HFA/VENTOLIN HFA) 108 (90 Base) MCG/ACT inhaler       escitalopram (LEXAPRO) 20 MG tablet       ipratropium - albuterol 0.5 mg/2.5 mg/3 mL (DUONEB) 0.5-2.5 (3) MG/3ML neb solution       methylPREDNISolone (MEDROL DOSEPAK) 4 MG tablet therapy pack          Review of Systems   All other systems reviewed and are negative.      Physical Exam   BP: (!) 158/96  Pulse: 67  Temp: 98.6  F (37  C)  Resp: 20  Weight: 83 kg (183 lb)  SpO2: 100 %      Physical Exam  Vitals signs and nursing note reviewed.   Constitutional:       General: She is not in acute distress.     Appearance: She is not diaphoretic.   HENT:      Head: Atraumatic.      Mouth/Throat:      Pharynx: No oropharyngeal exudate.   Eyes:      General: No scleral icterus.     Pupils: Pupils are equal, round, and reactive to light.   Cardiovascular:      Rate and Rhythm: Normal rate and regular rhythm.      Heart sounds: Normal heart sounds.   Pulmonary:      Effort: Pulmonary effort is normal. No respiratory distress.      Breath sounds: Normal breath sounds. No stridor. No wheezing.   Abdominal:      General: Bowel sounds are normal.      Palpations: Abdomen is soft.      Tenderness:  There is no abdominal tenderness.   Musculoskeletal:         General: No tenderness.   Skin:     General: Skin is warm.      Findings: No rash.   Neurological:      Mental Status: She is alert.         ED Course        Procedures               Critical Care time:  none               Results for orders placed or performed during the hospital encounter of 12/30/20 (from the past 24 hour(s))   CBC with platelets differential   Result Value Ref Range    WBC 5.4 4.0 - 11.0 10e9/L    RBC Count 4.32 3.8 - 5.2 10e12/L    Hemoglobin 14.0 11.7 - 15.7 g/dL    Hematocrit 42.0 35.0 - 47.0 %    MCV 97 78 - 100 fl    MCH 32.4 26.5 - 33.0 pg    MCHC 33.3 31.5 - 36.5 g/dL    RDW 12.2 10.0 - 15.0 %    Platelet Count 238 150 - 450 10e9/L    Diff Method Automated Method     % Neutrophils 56.1 %    % Lymphocytes 29.8 %    % Monocytes 11.0 %    % Eosinophils 2.2 %    % Basophils 0.7 %    % Immature Granulocytes 0.2 %    Nucleated RBCs 0 0 /100    Absolute Neutrophil 3.0 1.6 - 8.3 10e9/L    Absolute Lymphocytes 1.6 0.8 - 5.3 10e9/L    Absolute Monocytes 0.6 0.0 - 1.3 10e9/L    Absolute Basophils 0.0 0.0 - 0.2 10e9/L    Abs Immature Granulocytes 0.0 0 - 0.4 10e9/L    Absolute Nucleated RBC 0.0    Basic metabolic panel   Result Value Ref Range    Sodium 139 133 - 144 mmol/L    Potassium 4.1 3.4 - 5.3 mmol/L    Chloride 105 94 - 109 mmol/L    Carbon Dioxide 30 20 - 32 mmol/L    Anion Gap 4 3 - 14 mmol/L    Glucose 94 70 - 99 mg/dL    Urea Nitrogen 16 7 - 30 mg/dL    Creatinine 0.70 0.52 - 1.04 mg/dL    GFR Estimate >90 >60 mL/min/[1.73_m2]    GFR Estimate If Black >90 >60 mL/min/[1.73_m2]    Calcium 9.2 8.5 - 10.1 mg/dL   CRP inflammation   Result Value Ref Range    CRP Inflammation <2.9 0.0 - 8.0 mg/L   Symptomatic Influenza A/B & SARS-CoV2 (COVID-19) Virus PCR Multiplex    Specimen: Nasopharyngeal   Result Value Ref Range    Flu A/B & SARS-COV-2 PCR Source Nasopharyngeal     SARS-CoV-2 PCR Result NEGATIVE     Influenza A PCR Negative  NEG^Negative    Influenza B PCR Negative NEG^Negative    Respiratory Syncytial Virus PCR (Note)     Flu A/B & SARS-CoV-2 PCR Comment (Note)    XR Chest Port 1 View    Narrative    XR CHEST PORT 1 VW 12/30/2020 11:58 AM    HISTORY: Cough, SOB    COMPARISON: Chest x-ray 2/12/2014    FINDINGS:  No acute findings. The lungs are clear and there are no  pleural effusions. Normal heart size.    JAMES LUGO MD       Medications   ipratropium - albuterol 0.5 mg/2.5 mg/3 mL (DUONEB) neb solution 3 mL (3 mLs Nebulization Given 12/30/20 1136)   dexamethasone (DECADRON) PF oral solution (inj used orally) 6 mg (6 mg Oral Given 12/30/20 1135)       Assessments & Plan (with Medical Decision Making)  Neeta is a 50-year-old female who presents to the ER with shortness of breath ongoing for the last 5 days.  See history and focused physical exam as above  Well-appearing 50-year-old female in no acute distress, vital signs are stable, she is afebrile.  Oxygen saturation is 100% on room air, she does not have any stridor or accessory muscle usage.  Lung sounds clear to auscultation, even respirations.  Suspect that she has an allergic reaction versus possible COVID-19.  We will proceed with a rapid test for COVID-19, as well as other lab work and chest x-ray.  She is agreeable to this plan.  We will try a DuoNeb treatment as well as oral steroid  Patient states that she feels better after receiving the nebulizer treatment and Decadron.  Lab results as above.  CBC, BMP, CRP are all within normal limits.  Rapid Covid and influenza tests are both negative.  Chest x-ray is clear, no underlying consolidation, pleural effusion, pneumothorax.  Saturations remained 100% on room air.  Formed Mary Carmen of the results as above.  Suspect that she has a prolonged allergic reaction due to exposure to cats 5 days ago.  Recommended that she start back on an antihistamine, and will prescribe a steroid taper.  Also since the nebulizers helped more  than her albuterol inhaler, prescription was sent to the pharmacy for her to  DuoNeb's and a nebulizer machine.  She said she had also tried to get a hold of an allergist in [place], and we provided her with Dr. Wyatt's number to follow-up on an outpatient basis if she desires.  Discussed reasons to return to the ER, otherwise she may follow-up with her primary provider.  She understands and agrees, discharged in no acute distress     I have reviewed the nursing notes.    I have reviewed the findings, diagnosis, plan and need for follow up with the patient.       New Prescriptions    IPRATROPIUM - ALBUTEROL 0.5 MG/2.5 MG/3 ML (DUONEB) 0.5-2.5 (3) MG/3ML NEB SOLUTION    Take 1 vial (3 mLs) by nebulization every 4 hours as needed for shortness of breath / dyspnea or wheezing    METHYLPREDNISOLONE (MEDROL DOSEPAK) 4 MG TABLET THERAPY PACK    Follow Package Directions       Final diagnoses:   Allergic reaction, initial encounter       12/30/2020   [facility] EMERGENCY DEPT     Arlen Kauffman,   12/30/20 [id number]

## 2020-12-30 NOTE — ASSESSMENT & PLAN NOTE
Ocular watering, postnasal drainage and itching in the spring and fall.  Nasal congestion recently with cat exposure.  On antihistamine.    -Return to clinic for allergy testing.  Could not do today secondary to antihistamine use. Hold oral antihistamines.   -Start Flonase 2 sprays per nostril daily.

## 2020-12-30 NOTE — ASSESSMENT & PLAN NOTE
Cough that is productive and has been present for years.  Albuterol is beneficial.  Recently with exposure to cats she has had shortness of breath, tightness in chest and coughing.  Seen today in ER.  Chest symptoms improved with nebulized albuterol.  Had been on Advair in the past and did significantly better on Advair.  Chest symptoms are likely asthma.  -Return to clinic for allergy testing.  Could not do today with flaring of chest symptoms and recent antihistamine use.  -Start Breo 200/25 mcg 1 puff inhaled daily.  -Albuterol 2 puffs inhaled every 4 hours as needed for coughing, wheezing, shortness of breath or tightness in chest.  -Albuterol nebulized treatment every 4 hours as needed for chest tightness, wheezing, coughing and/or shortness of breath.   -Reviewed spirometry from 2014.  Would consider repeating.  This was not a good effort in 2014.

## 2020-12-30 NOTE — ASSESSMENT & PLAN NOTE
Cough that is productive and has been present for years.  Albuterol is beneficial.  Recently with exposure to cats she has had shortness of breath, tightness in chest and coughing.  Seen today in ER.  Chest symptoms improved with nebulized albuterol.  Had been on Advair in the past and did significantly better on Advair.    -Return to clinic for allergy testing.  Could not do today with flaring of chest symptoms and recent antihistamine use.  -Start Breo 200/25 mcg 1 puff inhaled daily.  -Albuterol 2 puffs inhaled every 4 hours as needed for coughing, wheezing, shortness of breath or tightness in chest.  -Albuterol nebulized treatment every 4 hours as needed for chest tightness, wheezing, coughing and/or shortness of breath.   -Reviewed spirometry from 2014.  Would consider repeating.  This was not a good effort in 2014.

## 2021-01-06 ENCOUNTER — OFFICE VISIT (OUTPATIENT)
Dept: ALLERGY | Facility: OTHER | Age: 51
End: 2021-01-06
Payer: COMMERCIAL

## 2021-01-06 VITALS
DIASTOLIC BLOOD PRESSURE: 87 MMHG | OXYGEN SATURATION: 100 % | HEART RATE: 68 BPM | WEIGHT: 180 LBS | SYSTOLIC BLOOD PRESSURE: 138 MMHG | BODY MASS INDEX: 27.28 KG/M2 | HEIGHT: 68 IN

## 2021-01-06 DIAGNOSIS — J45.40 MODERATE PERSISTENT ASTHMA WITHOUT COMPLICATION: ICD-10-CM

## 2021-01-06 DIAGNOSIS — J30.81 ALLERGIC RHINITIS DUE TO ANIMAL DANDER: ICD-10-CM

## 2021-01-06 DIAGNOSIS — J30.1 SEASONAL ALLERGIC RHINITIS DUE TO POLLEN: ICD-10-CM

## 2021-01-06 DIAGNOSIS — J30.89 ALLERGIC RHINITIS DUE TO DUST MITE: ICD-10-CM

## 2021-01-06 PROCEDURE — 99207 PR NO CHARGE LOS: CPT | Performed by: ALLERGY & IMMUNOLOGY

## 2021-01-06 PROCEDURE — 95004 PERQ TESTS W/ALRGNC XTRCS: CPT | Performed by: ALLERGY & IMMUNOLOGY

## 2021-01-06 RX ORDER — OLOPATADINE HYDROCHLORIDE 2 MG/ML
1 SOLUTION/ DROPS OPHTHALMIC DAILY
Qty: 1 BOTTLE | Refills: 11 | Status: SHIPPED | OUTPATIENT
Start: 2021-01-06 | End: 2022-01-25

## 2021-01-06 ASSESSMENT — MIFFLIN-ST. JEOR: SCORE: 1474.97

## 2021-01-06 ASSESSMENT — PAIN SCALES - GENERAL: PAINLEVEL: NO PAIN (0)

## 2021-01-06 NOTE — LETTER
1/6/2021         RE: Neeta Verde  [address]  [place] [address]        Dear Colleague,    Thank you for referring your patient, Neeta Verde, to the [facility]. Please see a copy of my visit note below.    Neeta Verde is a 50 year old White female with previous medical history significant for shortness of breath and rhinoconjuncitivitis who returns for a follow up visit.     Patient presents today for allergy skin testing. The patient is currently in a good state of health. No recent fevers, chills, cough, wheezing, shortness of breath, skin rash, angioedema, nausea, vomiting or diarrhea. The risks and benefits were discussed and the patient/patient's family wishes to proceed. The consent was signed.    Past Medical History:   Diagnosis Date     ASCUS favor benign 8/2015    Neg HPV     Depressive disorder      Family History   Adopted: Yes   Problem Relation Age of Onset     Other - See Comments Son         chron's     Crohn's Disease Son      Allergy (Severe) Son      Unknown/Adopted No family hx of         Unknown family history      Past Surgical History:   Procedure Laterality Date     C APPENDECTOMY  1978       REVIEW OF SYSTEMS:  General: negative for weight gain. negative for weight loss. negative for changes in sleep.   Ears: negative for fullness. negative for hearing loss. negative for dizziness.   Nose: negative for snoring.negative for changes in smell. negative for drainage.   Eyes: negative for eye watering. negative for eye itching. negative for vision changes. negative for eye redness.  Throat: negative for hoarseness. negative for sore throat. negative for trouble swallowing.   Lungs: negative for shortness of breath.negative for wheezing. negative for sputum production.   Cardiovascular: negative for chest pain. negative for swelling of ankles. negative for fast or irregular heartbeat.   Gastrointestinal: negative for nausea. negative for heartburn. negative  for acid reflux.   Musculoskeletal: negative for joint pain. negative for joint stiffness. negative for joint swelling.   Neurologic: negative for seizures. negative for fainting. negative for weakness.   Psychiatric: negative for changes in mood. negative for anxiety.   Endocrine: negative for cold intolerance. negative for heat intolerance. negative for tremors.   Lymphatic: negative for lower extremity swelling. negative for lymph node swelling.   Hematologic: negative for easy bruising. negative for easy bleeding.  Integumentary: negative for rash. negative for scaling. negative for nail changes.       Current Outpatient Medications:      albuterol (PROAIR HFA/PROVENTIL HFA/VENTOLIN HFA) 108 (90 Base) MCG/ACT inhaler, Inhale 2 puffs into the lungs every 4 hours as needed for wheezing, Disp: 1 Inhaler, Rfl: 1     escitalopram (LEXAPRO) 20 MG tablet, , Disp: , Rfl: 3     fluticasone (FLONASE) 50 MCG/ACT nasal spray, Spray 2 sprays into both nostrils daily, Disp: 16 g, Rfl: 3     fluticasone-vilanterol (BREO ELLIPTA) 200-25 MCG/INH inhaler, Inhale 1 puff into the lungs daily, Disp: 1 Inhaler, Rfl: 3     ipratropium - albuterol 0.5 mg/2.5 mg/3 mL (DUONEB) 0.5-2.5 (3) MG/3ML neb solution, Take 1 vial (3 mLs) by nebulization every 4 hours as needed for shortness of breath / dyspnea or wheezing, Disp: 42 vial, Rfl: 0     loratadine (CLARITIN) 10 MG tablet, Take 10 mg by mouth daily, Disp: , Rfl:      methylPREDNISolone (MEDROL DOSEPAK) 4 MG tablet therapy pack, Follow Package Directions, Disp: 21 tablet, Rfl: 0     olopatadine (PATADAY) 0.2 % ophthalmic solution, Place 1 drop into both eyes daily, Disp: 1 Bottle, Rfl: 11  Immunization History   Administered Date(s) Administered     Influenza Vaccine IM > 6 months Valent IIV4 09/22/2016, 09/20/2017, 09/10/2018     Mantoux Tuberculin Skin Test 02/12/2014     TDAP Vaccine (Adacel) 06/12/2009     Allergies   Allergen Reactions     Azithromycin      Zithromax - Racing heart      Moxifloxacin Hydrochloride      Avelox - Labored breathing     WORKUP:   ENVIRONMENTAL PERCUTANEOUS SKIN TESTING: ADULT  [facility] 1/6/2021   Consent Y   Ordering Physician Dr. Wyatt   Interpreting Physician Dr. Wyatt   Testing Technician    Location Back   Time start:  4:40 PM   Time End:  4:55 PM   Positive Control: Histatrol*ALK 1 mg/ml 5/15   Negative Control: 50% Glycerin 0   Cat Hair*ALK (10,000 BAU/ml) 4/20   AP Dog Hair/Dander (1:100 w/v) 4/20   Dust Mite p. 30,000 AU/ml 4/30   Dust Mite f. (30,000 AU/ml) 4/20   Vincent (W/F in millimeters) 0   Bc Grass (100,000 BAU/mL) 16/40   Red [place] (W/F in millimeters) 0   Maple/[place] (W/F in millimeters) 0   Hackberry (W/F in millimeters) 0   [place] (W/F in millimeters) 0   [place] *ALK (W/F in millimeters) 0   American Elm (W/F in millimeters) 0   [place] (W/F in millimeters) 0   Black [place] (W/F in millimeters) 0   Birch Mix (W/F in millimeters) 10/30   [place] (W/F in millimeters) 0   Oak (W/F in millimeters) 3/6   Cocklebur (W/F in millimeters) 3/5   [place] (W/F in millimeters) 0   White [place] (W/F in millimeters) 0   Careless (W/F in millimeters) 0   Nettle (W/F in millimeters) 3/7   English Plantain (W/F in millimeters) 0   Kochia (W/F in millimeters) 0   Lamb's Quarter (W/F in millimeters) 0   Marshelder (W/F in millimeters) 3/6   Ragweed Mix* ALK (W/F in millimeters) 10/30   Russian Thistle (W/F in millimeters) 0   Sagebrush/Mugwort (W/F in millimeters) 0   Sheep Sorrel (W/F in millimeters) 0   Feather Mix* ALK (W/F in millimeters) 0   Penicillium Mix (1:10 w/v) 0   Curvularia spicifera (1:10 w/v) 0   Epicoccum (1:10 w/v) 0   Aspergillus fumigatus (1:10 w/v): 0   Alternaria tenius (1:10 w/v) 0   H. Cladosporium (1:10 w/v) 0   Phoma herbarum (1:10 w/v) 0        ASSESSMENT/PLAN:  Problem List Items Addressed This Visit        Respiratory    Moderate persistent asthma without complication     Cough that is productive and has  been present for years.  Albuterol is beneficial.  Recently with exposure to cats she has had shortness of breath, tightness in chest and coughing.   Chest symptoms improved with nebulized albuterol.  Had been on Advair in the past and did significantly better on Advair.    Started on Breo and helpful.       -Breo 200/25 mcg 1 puff inhaled daily.  -Albuterol 2 puffs inhaled every 4 hours as needed for coughing, wheezing, shortness of breath or tightness in chest.  -Albuterol nebulized treatment every 4 hours as needed for chest tightness, wheezing, coughing and/or shortness of breath.            Allergic rhinitis due to animal dander     Ocular watering, postnasal drainage and itching in the spring and fall.  Nasal congestion recently with cat exposure.  On antihistamine.     Allergy testing positive for cat, dog, dust mite, grass, trees, weeds.  -Allergen avoidance measures discussed and literature provided.  -Flonase 2 sprays per nostril daily.  -Pataday 1 drop per eye daily as needed.  -Oral antihistamine as needed.         Relevant Medications    olopatadine (PATADAY) 0.2 % ophthalmic solution    Other Relevant Orders    ALLERGY SKIN TESTS,ALLERGENS [85910] (Completed)    Allergic rhinitis due to dust mite    Seasonal allergic rhinitis due to pollen          Chart documentation with Dragon Voice recognition Software. Although reviewed after completion, some words and grammatical errors may remain.    Terrence Wyatt DO FAAAAI  Medical Director for Allergy/Immunology at [place], [place]        Again, thank you for allowing me to participate in the care of your patient.        Sincerely,        Terrence Wyatt DO

## 2021-01-06 NOTE — ASSESSMENT & PLAN NOTE
Cough that is productive and has been present for years.  Albuterol is beneficial.  Recently with exposure to cats she has had shortness of breath, tightness in chest and coughing.   Chest symptoms improved with nebulized albuterol.  Had been on Advair in the past and did significantly better on Advair.    Started on Breo and helpful.       -Breo 200/25 mcg 1 puff inhaled daily.  -Albuterol 2 puffs inhaled every 4 hours as needed for coughing, wheezing, shortness of breath or tightness in chest.  -Albuterol nebulized treatment every 4 hours as needed for chest tightness, wheezing, coughing and/or shortness of breath.

## 2021-01-06 NOTE — ASSESSMENT & PLAN NOTE
Ocular watering, postnasal drainage and itching in the spring and fall.  Nasal congestion recently with cat exposure.  On antihistamine.     Allergy testing positive for cat, dog, dust mite, grass, trees, weeds.  -Allergen avoidance measures discussed and literature provided.  -Flonase 2 sprays per nostril daily.  -Pataday 1 drop per eye daily as needed.  -Oral antihistamine as needed.

## 2021-01-06 NOTE — PATIENT INSTRUCTIONS
Allergy Staff Appt Hours Shot Hours Locations    Physician     Terrence Wyatt DO       Support Staff     Jackie FUENTES RN      Stacey WALDEN CMA  Tuesday:        [place] 7-5 Wednesday:        [place]: 7-5 Thursday:                    [place] 7-6     Friday:  [place]  7-2   [place]        Thursday: 8-5:20        Friday: 7-12     [place]        Tuesday: 7- 3:20 Wednesday: 7-4:20     [place] Monday: 7-2:20 Tuesday: 9-5:20         [facility]  [address] [place] [address]  Appt Line: [phone]  Allergy RN:  [phone]    [facility]  [address] [place] [address]  Appt Line: [phone]  Allergy RN:  [phone]       Important Scheduling Information  Aspirin Desensitization: Appt will last 2 clinic days. Please call the Allergy RN line for your clinic to schedule. Discontinue antihistamines 7 days prior to the appointment.     Food Challenges: Appt will last 3-4 hours. Please call the Allergy RN line for your clinic to schedule. Discontinue antihistamines 7 days prior to the appointment.     Penicillin Testing: Appt will last 2-3 hours. Please call the Allergy RN line for your clinic to schedule. Discontinue antihistamines 7 days prior to the appointment.     Skin Testing: Appt will about 40 minutes. Call the appointment line for your clinic to schedule. Discontinue antihistamines 7 days prior to the appointment.     Venom Testing: Appt will last 2-3 hours. Please call the Allergy RN line for your clinic to schedule. Discontinue antihistamines 7 days prior to the appointment.     Thank you for trusting us with your Allergy, Asthma, and Immunology care. Please feel free to contact us with any questions or concerns you may have.      -Flonase 2 sprays per nostril daily.  -Pataday 1 drop per eye daily as needed.  -Oral antihistamine as needed.  -Continue Breo 200/25 mcg 1 puff inhaled daily.  -Albuterol as needed every 4 hours for coughing, wheezing or  tightness in chest.

## 2021-01-06 NOTE — PROGRESS NOTES
Neeta Verde is a 50 year old White female with previous medical history significant for shortness of breath and rhinoconjuncitivitis who returns for a follow up visit.     Patient presents today for allergy skin testing. The patient is currently in a good state of health. No recent fevers, chills, cough, wheezing, shortness of breath, skin rash, angioedema, nausea, vomiting or diarrhea. The risks and benefits were discussed and the patient/patient's family wishes to proceed. The consent was signed.    Past Medical History:   Diagnosis Date     ASCUS favor benign 8/2015    Neg HPV     Depressive disorder      Family History   Adopted: Yes   Problem Relation Age of Onset     Other - See Comments Son         chron's     Crohn's Disease Son      Allergy (Severe) Son      Unknown/Adopted No family hx of         Unknown family history      Past Surgical History:   Procedure Laterality Date     C APPENDECTOMY  1978       REVIEW OF SYSTEMS:  General: negative for weight gain. negative for weight loss. negative for changes in sleep.   Ears: negative for fullness. negative for hearing loss. negative for dizziness.   Nose: negative for snoring.negative for changes in smell. negative for drainage.   Eyes: negative for eye watering. negative for eye itching. negative for vision changes. negative for eye redness.  Throat: negative for hoarseness. negative for sore throat. negative for trouble swallowing.   Lungs: negative for shortness of breath.negative for wheezing. negative for sputum production.   Cardiovascular: negative for chest pain. negative for swelling of ankles. negative for fast or irregular heartbeat.   Gastrointestinal: negative for nausea. negative for heartburn. negative for acid reflux.   Musculoskeletal: negative for joint pain. negative for joint stiffness. negative for joint swelling.   Neurologic: negative for seizures. negative for fainting. negative for weakness.   Psychiatric: negative for changes in  mood. negative for anxiety.   Endocrine: negative for cold intolerance. negative for heat intolerance. negative for tremors.   Lymphatic: negative for lower extremity swelling. negative for lymph node swelling.   Hematologic: negative for easy bruising. negative for easy bleeding.  Integumentary: negative for rash. negative for scaling. negative for nail changes.       Current Outpatient Medications:      albuterol (PROAIR HFA/PROVENTIL HFA/VENTOLIN HFA) 108 (90 Base) MCG/ACT inhaler, Inhale 2 puffs into the lungs every 4 hours as needed for wheezing, Disp: 1 Inhaler, Rfl: 1     escitalopram (LEXAPRO) 20 MG tablet, , Disp: , Rfl: 3     fluticasone (FLONASE) 50 MCG/ACT nasal spray, Spray 2 sprays into both nostrils daily, Disp: 16 g, Rfl: 3     fluticasone-vilanterol (BREO ELLIPTA) 200-25 MCG/INH inhaler, Inhale 1 puff into the lungs daily, Disp: 1 Inhaler, Rfl: 3     ipratropium - albuterol 0.5 mg/2.5 mg/3 mL (DUONEB) 0.5-2.5 (3) MG/3ML neb solution, Take 1 vial (3 mLs) by nebulization every 4 hours as needed for shortness of breath / dyspnea or wheezing, Disp: 42 vial, Rfl: 0     loratadine (CLARITIN) 10 MG tablet, Take 10 mg by mouth daily, Disp: , Rfl:      methylPREDNISolone (MEDROL DOSEPAK) 4 MG tablet therapy pack, Follow Package Directions, Disp: 21 tablet, Rfl: 0     olopatadine (PATADAY) 0.2 % ophthalmic solution, Place 1 drop into both eyes daily, Disp: 1 Bottle, Rfl: 11  Immunization History   Administered Date(s) Administered     Influenza Vaccine IM > 6 months Valent IIV4 09/22/2016, 09/20/2017, 09/10/2018     Mantoux Tuberculin Skin Test 02/12/2014     TDAP Vaccine (Adacel) 06/12/2009     Allergies   Allergen Reactions     Azithromycin      Zithromax - Racing heart     Moxifloxacin Hydrochloride      Avelox - Labored breathing     WORKUP:   ENVIRONMENTAL PERCUTANEOUS SKIN TESTING: ADULT  [facility] 1/6/2021   Consent Y   Ordering Physician Dr. Wyatt   Interpreting Physician Dr. Wyatt    Testing Technician sg   Location Back   Time start:  4:40 PM   Time End:  4:55 PM   Positive Control: Histatrol*ALK 1 mg/ml 5/15   Negative Control: 50% Glycerin 0   Cat Hair*ALK (10,000 BAU/ml) 4/20   AP Dog Hair/Dander (1:100 w/v) 4/20   Dust Mite p. 30,000 AU/ml 4/30   Dust Mite f. (30,000 AU/ml) 4/20   Vincent (W/F in millimeters) 0   Bc Grass (100,000 BAU/mL) 16/40   Red [place] (W/F in millimeters) 0   Maple/[place] (W/F in millimeters) 0   Hackberry (W/F in millimeters) 0   [place] (W/F in millimeters) 0   [place] *ALK (W/F in millimeters) 0   American Elm (W/F in millimeters) 0   [place] (W/F in millimeters) 0   Black [place] (W/F in millimeters) 0   Birch Mix (W/F in millimeters) 10/30   [place] (W/F in millimeters) 0   Oak (W/F in millimeters) 3/6   [place]ocklebur (W/F in millimeters) 3/5   [place] (W/F in millimeters) 0   White Temo (W/F in millimeters) 0   Careless (W/F in millimeters) 0   Nettle (W/F in millimeters) 3/7   English Plantain (W/F in millimeters) 0   Kochia (W/F in millimeters) 0   Lamb's Quarter (W/F in millimeters) 0   Marshelder (W/F in millimeters) 3/6   Ragweed Mix* ALK (W/F in millimeters) 10/30   Russian Thistle (W/F in millimeters) 0   Sagebrush/Mugwort (W/F in millimeters) 0   Sheep Sorrel (W/F in millimeters) 0   Feather Mix* ALK (W/F in millimeters) 0   Penicillium Mix (1:10 w/v) 0   Curvularia spicifera (1:10 w/v) 0   Epicoccum (1:10 w/v) 0   Aspergillus fumigatus (1:10 w/v): 0   Alternaria tenius (1:10 w/v) 0   H. Cladosporium (1:10 w/v) 0   Phoma herbarum (1:10 w/v) 0        ASSESSMENT/PLAN:  Problem List Items Addressed This Visit        Respiratory    Moderate persistent asthma without complication     Cough that is productive and has been present for years.  Albuterol is beneficial.  Recently with exposure to cats she has had shortness of breath, tightness in chest and coughing.   Chest symptoms improved with nebulized albuterol.  Had been on Advair in the past and  did significantly better on Advair.    Started on Breo and helpful.       -Breo 200/25 mcg 1 puff inhaled daily.  -Albuterol 2 puffs inhaled every 4 hours as needed for coughing, wheezing, shortness of breath or tightness in chest.  -Albuterol nebulized treatment every 4 hours as needed for chest tightness, wheezing, coughing and/or shortness of breath.            Allergic rhinitis due to animal dander     Ocular watering, postnasal drainage and itching in the spring and fall.  Nasal congestion recently with cat exposure.  On antihistamine.     Allergy testing positive for cat, dog, dust mite, grass, trees, weeds.  -Allergen avoidance measures discussed and literature provided.  -Flonase 2 sprays per nostril daily.  -Pataday 1 drop per eye daily as needed.  -Oral antihistamine as needed.         Relevant Medications    olopatadine (PATADAY) 0.2 % ophthalmic solution    Other Relevant Orders    ALLERGY SKIN TESTS,ALLERGENS [43986] (Completed)    Allergic rhinitis due to dust mite    Seasonal allergic rhinitis due to pollen          Chart documentation with Dragon Voice recognition Software. Although reviewed after completion, some words and grammatical errors may remain.    Terrence Wyatt DO FAAAAI  Medical Director for Allergy/Immunology at [place], [place]

## 2021-01-09 ENCOUNTER — HEALTH MAINTENANCE LETTER (OUTPATIENT)
Age: 51
End: 2021-01-09

## 2021-02-17 ENCOUNTER — VIRTUAL VISIT (OUTPATIENT)
Dept: ALLERGY | Facility: OTHER | Age: 51
End: 2021-02-17
Payer: COMMERCIAL

## 2021-02-17 VITALS — HEIGHT: 68 IN | WEIGHT: 180 LBS | BODY MASS INDEX: 27.28 KG/M2

## 2021-02-17 DIAGNOSIS — J30.81 ALLERGIC RHINITIS DUE TO ANIMAL DANDER: ICD-10-CM

## 2021-02-17 DIAGNOSIS — J30.1 SEASONAL ALLERGIC RHINITIS DUE TO POLLEN: ICD-10-CM

## 2021-02-17 DIAGNOSIS — J30.89 ALLERGIC RHINITIS DUE TO DUST MITE: ICD-10-CM

## 2021-02-17 DIAGNOSIS — J45.40 MODERATE PERSISTENT ASTHMA WITHOUT COMPLICATION: Primary | ICD-10-CM

## 2021-02-17 PROCEDURE — 99213 OFFICE O/P EST LOW 20 MIN: CPT | Mod: 95 | Performed by: ALLERGY & IMMUNOLOGY

## 2021-02-17 RX ORDER — ALBUTEROL SULFATE 90 UG/1
2 AEROSOL, METERED RESPIRATORY (INHALATION) EVERY 4 HOURS PRN
Qty: 1 INHALER | Refills: 1 | Status: SHIPPED | OUTPATIENT
Start: 2021-02-17 | End: 2021-04-12

## 2021-02-17 ASSESSMENT — MIFFLIN-ST. JEOR: SCORE: 1469.97

## 2021-02-17 NOTE — PROGRESS NOTES
"Neeta Verde is a 51 year old female who is being evaluated via a billable video visit.      The patient has been notified of following:      \"This video visit will be conducted via a call between you and your physician/provider. We have found that certain health care needs can be provided without the need for an in-person physical exam.  This service lets us provide the care you need with a video conversation.  If a prescription is necessary we can send it directly to your pharmacy.  If lab work is needed we can place an order for that and you can then stop by our lab to have the test done at a later time.     If during the course of the call the physician/provider feels a video visit is not appropriate, you will not be charged for this service.\"    Patient has given verbal consent for Video visit? Yes     Patient would like the video invitation sent by: [phone]     I have reviewed and updated the patient's Past Medical History, Social History, Family History and Medication List.    VIDEO DID NOT WORK. SWITCHED TO PHONE VISIT.     Allergy testing positive for cat, dog, dust mite, grass, trees, weeds.    On Breo 200/25mcg 1 puff daily. This has been significantly helpful. With cold exposure she will have tightness in chest. Albuterol is helpful. No other triggers. She is going outside daily. She is not pre-treating with albuterol.      On Flonase 2 sprays/nostril daily. This has been helpful. If missed has congestion and rhinorrhea. Additionally on Pataday 1 drop/eye daily as needed.      Past Medical History:   Diagnosis Date     ASCUS favor benign 8/2015    Neg HPV     Depressive disorder      Family History   Adopted: Yes   Problem Relation Age of Onset     Other - See Comments Son         chron's     Crohn's Disease Son      Allergy (Severe) Son      Unknown/Adopted No family hx of         Unknown family history      Past Surgical History:   Procedure Laterality Date     C APPENDECTOMY  1978         Current " Outpatient Medications:      albuterol (PROAIR HFA/PROVENTIL HFA/VENTOLIN HFA) 108 (90 Base) MCG/ACT inhaler, Inhale 2 puffs into the lungs every 4 hours as needed for wheezing, Disp: 1 Inhaler, Rfl: 1     escitalopram (LEXAPRO) 20 MG tablet, , Disp: , Rfl: 3     fluticasone (FLONASE) 50 MCG/ACT nasal spray, Spray 2 sprays into both nostrils daily, Disp: 16 g, Rfl: 3     fluticasone-vilanterol (BREO ELLIPTA) 200-25 MCG/INH inhaler, Inhale 1 puff into the lungs daily, Disp: 1 Inhaler, Rfl: 3     loratadine (CLARITIN) 10 MG tablet, Take 10 mg by mouth daily, Disp: , Rfl:      methylPREDNISolone (MEDROL DOSEPAK) 4 MG tablet therapy pack, Follow Package Directions, Disp: 21 tablet, Rfl: 0     olopatadine (PATADAY) 0.2 % ophthalmic solution, Place 1 drop into both eyes daily, Disp: 1 Bottle, Rfl: 11     ipratropium - albuterol 0.5 mg/2.5 mg/3 mL (DUONEB) 0.5-2.5 (3) MG/3ML neb solution, Take 1 vial (3 mLs) by nebulization every 4 hours as needed for shortness of breath / dyspnea or wheezing, Disp: 42 vial, Rfl: 0  Immunization History   Administered Date(s) Administered     Influenza Vaccine IM > 6 months Valent IIV4 09/22/2016, 09/20/2017, 09/10/2018     Mantoux Tuberculin Skin Test 02/12/2014     TDAP Vaccine (Adacel) 06/12/2009     Allergies   Allergen Reactions     Azithromycin      Zithromax - Racing heart     Moxifloxacin Hydrochloride      Avelox - Labored breathing         ASSESSMENT/PLAN:  Problem List Items Addressed This Visit        Respiratory    Moderate persistent asthma without complication - Primary     Cough that is productive and has been present for years.  Albuterol is beneficial. Cold air causing chest tightness.      Started on Breo and helpful.     -Breo 200/25 mcg 1 puff inhaled daily.  -Albuterol 2 puffs inhaled every 4 hours as needed for coughing, wheezing, shortness of breath or tightness in chest.  -Albuterol nebulized treatment every 4 hours as needed for chest tightness, wheezing, coughing  and/or shortness of breath.   - Use albuterol prior to cold exposure.   - Consider allergy shots.          Relevant Medications    albuterol (PROAIR HFA/PROVENTIL HFA/VENTOLIN HFA) 108 (90 Base) MCG/ACT inhaler    Allergic rhinitis due to animal dander     Ocular watering, postnasal drainage and itching in the spring and fall.  Nasal congestion recently with cat exposure.  On antihistamine. Flonase has been helpful.      Allergy testing positive for cat, dog, dust mite, grass, trees, weeds.    -Consider allergy shots.   -Flonase 2 sprays per nostril daily.  -Pataday 1 drop per eye daily as needed.  -Oral antihistamine as needed.         Relevant Medications    albuterol (PROAIR HFA/PROVENTIL HFA/VENTOLIN HFA) 108 (90 Base) MCG/ACT inhaler    Allergic rhinitis due to dust mite    Relevant Medications    albuterol (PROAIR HFA/PROVENTIL HFA/VENTOLIN HFA) 108 (90 Base) MCG/ACT inhaler    Seasonal allergic rhinitis due to pollen    Relevant Medications    albuterol (PROAIR HFA/PROVENTIL HFA/VENTOLIN HFA) 108 (90 Base) MCG/ACT inhaler      Return in 4 months.     Chart documentation with Dragon Voice recognition Software. Although reviewed after completion, some words and grammatical errors may remain.    I have reviewed the note as documented above.  This accurately captures the substance of my conversation with the patient.    Video visit contact time  Video visit started at 1605  Video visit ended at 1616      Terrence Wyatt DO FAAAAI  Allergy/Immunology  [place], [place]

## 2021-02-17 NOTE — LETTER
"    2/17/2021         RE: Neeta Verde  [address]  [place] [address]        Dear Colleague,    Thank you for referring your patient, Neeta Verde, to the [facility]. Please see a copy of my visit note below.    Neeta Verde is a 51 year old female who is being evaluated via a billable video visit.      The patient has been notified of following:      \"This video visit will be conducted via a call between you and your physician/provider. We have found that certain health care needs can be provided without the need for an in-person physical exam.  This service lets us provide the care you need with a video conversation.  If a prescription is necessary we can send it directly to your pharmacy.  If lab work is needed we can place an order for that and you can then stop by our lab to have the test done at a later time.     If during the course of the call the physician/provider feels a video visit is not appropriate, you will not be charged for this service.\"    Patient has given verbal consent for Video visit? Yes     Patient would like the video invitation sent by: [phone]     I have reviewed and updated the patient's Past Medical History, Social History, Family History and Medication List.    VIDEO DID NOT WORK. SWITCHED TO PHONE VISIT.     Allergy testing positive for cat, dog, dust mite, grass, trees, weeds.    On Breo 200/25mcg 1 puff daily. This has been significantly helpful. With cold exposure she will have tightness in chest. Albuterol is helpful. No other triggers. She is going outside daily. She is not pre-treating with albuterol.      On Flonase 2 sprays/nostril daily. This has been helpful. If missed has congestion and rhinorrhea. Additionally on Pataday 1 drop/eye daily as needed.      Past Medical History:   Diagnosis Date     ASCUS favor benign 8/2015    Neg HPV     Depressive disorder      Family History   Adopted: Yes   Problem Relation Age of Onset     Other - " See Comments Son         chron's     Crohn's Disease Son      Allergy (Severe) Son      Unknown/Adopted No family hx of         Unknown family history      Past Surgical History:   Procedure Laterality Date     C APPENDECTOMY  1978         Current Outpatient Medications:      albuterol (PROAIR HFA/PROVENTIL HFA/VENTOLIN HFA) 108 (90 Base) MCG/ACT inhaler, Inhale 2 puffs into the lungs every 4 hours as needed for wheezing, Disp: 1 Inhaler, Rfl: 1     escitalopram (LEXAPRO) 20 MG tablet, , Disp: , Rfl: 3     fluticasone (FLONASE) 50 MCG/ACT nasal spray, Spray 2 sprays into both nostrils daily, Disp: 16 g, Rfl: 3     fluticasone-vilanterol (BREO ELLIPTA) 200-25 MCG/INH inhaler, Inhale 1 puff into the lungs daily, Disp: 1 Inhaler, Rfl: 3     loratadine (CLARITIN) 10 MG tablet, Take 10 mg by mouth daily, Disp: , Rfl:      methylPREDNISolone (MEDROL DOSEPAK) 4 MG tablet therapy pack, Follow Package Directions, Disp: 21 tablet, Rfl: 0     olopatadine (PATADAY) 0.2 % ophthalmic solution, Place 1 drop into both eyes daily, Disp: 1 Bottle, Rfl: 11     ipratropium - albuterol 0.5 mg/2.5 mg/3 mL (DUONEB) 0.5-2.5 (3) MG/3ML neb solution, Take 1 vial (3 mLs) by nebulization every 4 hours as needed for shortness of breath / dyspnea or wheezing, Disp: 42 vial, Rfl: 0  Immunization History   Administered Date(s) Administered     Influenza Vaccine IM > 6 months Valent IIV4 09/22/2016, 09/20/2017, 09/10/2018     Mantoux Tuberculin Skin Test 02/12/2014     TDAP Vaccine (Adacel) 06/12/2009     Allergies   Allergen Reactions     Azithromycin      Zithromax - Racing heart     Moxifloxacin Hydrochloride      Avelox - Labored breathing         ASSESSMENT/PLAN:  Problem List Items Addressed This Visit        Respiratory    Moderate persistent asthma without complication - Primary     Cough that is productive and has been present for years.  Albuterol is beneficial. Cold air causing chest tightness.      Started on Breo and helpful.     -Breo  200/25 mcg 1 puff inhaled daily.  -Albuterol 2 puffs inhaled every 4 hours as needed for coughing, wheezing, shortness of breath or tightness in chest.  -Albuterol nebulized treatment every 4 hours as needed for chest tightness, wheezing, coughing and/or shortness of breath.   - Use albuterol prior to cold exposure.   - Consider allergy shots.          Relevant Medications    albuterol (PROAIR HFA/PROVENTIL HFA/VENTOLIN HFA) 108 (90 Base) MCG/ACT inhaler    Allergic rhinitis due to animal dander     Ocular watering, postnasal drainage and itching in the spring and fall.  Nasal congestion recently with cat exposure.  On antihistamine. Flonase has been helpful.      Allergy testing positive for cat, dog, dust mite, grass, trees, weeds.    -Consider allergy shots.   -Flonase 2 sprays per nostril daily.  -Pataday 1 drop per eye daily as needed.  -Oral antihistamine as needed.         Relevant Medications    albuterol (PROAIR HFA/PROVENTIL HFA/VENTOLIN HFA) 108 (90 Base) MCG/ACT inhaler    Allergic rhinitis due to dust mite    Relevant Medications    albuterol (PROAIR HFA/PROVENTIL HFA/VENTOLIN HFA) 108 (90 Base) MCG/ACT inhaler    Seasonal allergic rhinitis due to pollen    Relevant Medications    albuterol (PROAIR HFA/PROVENTIL HFA/VENTOLIN HFA) 108 (90 Base) MCG/ACT inhaler      Return in 4 months.     Chart documentation with Dragon Voice recognition Software. Although reviewed after completion, some words and grammatical errors may remain.    I have reviewed the note as documented above.  This accurately captures the substance of my conversation with the patient.    Video visit contact time  Video visit started at 1605  Video visit ended at 1616      Terrence Wyatt DO FAAAAI  Allergy/Immunology  [place], [place]        Again, thank you for allowing me to participate in the care of your patient.        Sincerely,        Terrence Wyatt DO

## 2021-02-17 NOTE — ASSESSMENT & PLAN NOTE
Cough that is productive and has been present for years.  Albuterol is beneficial. Cold air causing chest tightness.      Started on Breo and helpful.     -Breo 200/25 mcg 1 puff inhaled daily.  -Albuterol 2 puffs inhaled every 4 hours as needed for coughing, wheezing, shortness of breath or tightness in chest.  -Albuterol nebulized treatment every 4 hours as needed for chest tightness, wheezing, coughing and/or shortness of breath.   - Use albuterol prior to cold exposure.   - Consider allergy shots.

## 2021-02-17 NOTE — ASSESSMENT & PLAN NOTE
Ocular watering, postnasal drainage and itching in the spring and fall.  Nasal congestion recently with cat exposure.  On antihistamine. Flonase has been helpful.      Allergy testing positive for cat, dog, dust mite, grass, trees, weeds.    -Consider allergy shots.   -Flonase 2 sprays per nostril daily.  -Pataday 1 drop per eye daily as needed.  -Oral antihistamine as needed.

## 2021-02-17 NOTE — PATIENT INSTRUCTIONS
Allergy Staff Appt Hours Shot Hours Locations    Physician     Terrence Wyatt DO       Support Staff     ROSINA Weir CMA  Tuesday:        [place] 7-5 Wednesday:        [place]: 7-5 Thursday:                    [place] 7-6     Friday:  [place]  7-2   [place]        Thursday: 8-5:20        Friday: 7-12     [place]        Tuesday: 7- 3:20 Wednesday: 7-4:20     [place] Monday: 7-2:20 Tuesday: 9-5:20         [facility]  [address] [place] [address]  Appt Line: [phone]  Allergy RN:  [phone]    [facility]  [address] [place] [address]  Appt Line: [phone]  Allergy RN:  [phone]       Important Scheduling Information  Aspirin Desensitization: Appt will last 2 clinic days. Please call the Allergy RN line for your clinic to schedule. Discontinue antihistamines 7 days prior to the appointment.     Food Challenges: Appt will last 3-4 hours. Please call the Allergy RN line for your clinic to schedule. Discontinue antihistamines 7 days prior to the appointment.     Penicillin Testing: Appt will last 2-3 hours. Please call the Allergy RN line for your clinic to schedule. Discontinue antihistamines 7 days prior to the appointment.     Skin Testing: Appt will about 40 minutes. Call the appointment line for your clinic to schedule. Discontinue antihistamines 7 days prior to the appointment.     Venom Testing: Appt will last 2-3 hours. Please call the Allergy RN line for your clinic to schedule. Discontinue antihistamines 7 days prior to the appointment.     Thank you for trusting us with your Allergy, Asthma, and Immunology care. Please feel free to contact us with any questions or concerns you may have.

## 2021-04-12 DIAGNOSIS — J45.40 MODERATE PERSISTENT ASTHMA WITHOUT COMPLICATION: ICD-10-CM

## 2021-04-13 RX ORDER — ALBUTEROL SULFATE 90 UG/1
2 AEROSOL, METERED RESPIRATORY (INHALATION) EVERY 4 HOURS PRN
Qty: 18 G | Refills: 3 | Status: SHIPPED | OUTPATIENT
Start: 2021-04-13 | End: 2021-06-15

## 2021-04-13 NOTE — TELEPHONE ENCOUNTER
Pending Prescriptions:                       Disp   Refills    albuterol (PROAIR HFA/PROVENTIL HFA/LEO*                    Sig: Inhale 2 puffs into the lungs every 4 hours as           needed for wheezing    Routing refill request to provider for review/approval because:  No recent ACT on file.    Jackie Sierra RN

## 2021-04-27 DIAGNOSIS — R06.02 SHORTNESS OF BREATH: ICD-10-CM

## 2021-04-27 NOTE — TELEPHONE ENCOUNTER
Requested Prescriptions   Pending Prescriptions Disp Refills     fluticasone-vilanterol (BREO ELLIPTA) 200-25 MCG/INH inhaler       Sig: Inhale 1 puff into the lungs daily       There is no refill protocol information for this order            Stacey Torrez MA

## 2021-04-27 NOTE — TELEPHONE ENCOUNTER
Pending Prescriptions:                       Disp   Refills    fluticasone-vilanterol (BREO ELLIPTA) 200*                    Sig: Inhale 1 puff into the lungs daily    Routing refill request to provider for review/approval because:  ACT not current:  No current ACT on file.    Jackie Sierra RN

## 2021-05-14 ENCOUNTER — IMMUNIZATION (OUTPATIENT)
Dept: LAB | Facility: OTHER | Age: 51
End: 2021-05-14

## 2021-06-15 ENCOUNTER — VIRTUAL VISIT (OUTPATIENT)
Dept: ALLERGY | Facility: OTHER | Age: 51
End: 2021-06-15
Payer: COMMERCIAL

## 2021-06-15 VITALS — BODY MASS INDEX: 27.37 KG/M2 | HEIGHT: 68 IN

## 2021-06-15 DIAGNOSIS — J45.40 MODERATE PERSISTENT ASTHMA WITHOUT COMPLICATION: Primary | ICD-10-CM

## 2021-06-15 DIAGNOSIS — J30.89 ALLERGIC RHINITIS DUE TO DUST MITE: ICD-10-CM

## 2021-06-15 DIAGNOSIS — J30.1 SEASONAL ALLERGIC RHINITIS DUE TO POLLEN: ICD-10-CM

## 2021-06-15 DIAGNOSIS — J30.81 ALLERGIC RHINITIS DUE TO ANIMAL DANDER: ICD-10-CM

## 2021-06-15 PROCEDURE — 99213 OFFICE O/P EST LOW 20 MIN: CPT | Mod: GT | Performed by: ALLERGY & IMMUNOLOGY

## 2021-06-15 RX ORDER — ALBUTEROL SULFATE 90 UG/1
2 AEROSOL, METERED RESPIRATORY (INHALATION) EVERY 4 HOURS PRN
Qty: 18 G | Refills: 3 | Status: SHIPPED | OUTPATIENT
Start: 2021-06-15 | End: 2022-01-25

## 2021-06-15 NOTE — PROGRESS NOTES
"Neeta Verde is a 51 year old female who is being evaluated via a billable video visit.      The patient has been notified of following:      \"This video visit will be conducted via a call between you and your physician/provider. We have found that certain health care needs can be provided without the need for an in-person physical exam.  This service lets us provide the care you need with a video conversation.  If a prescription is necessary we can send it directly to your pharmacy.  If lab work is needed we can place an order for that and you can then stop by our lab to have the test done at a later time.     If during the course of the call the physician/provider feels a video visit is not appropriate, you will not be charged for this service.\"    Patient has given verbal consent for Video visit? Yes     Patient would like the video invitation sent by: [phone]     I have reviewed and updated the patient's Past Medical History, Social History, Family History and Medication List.    Doing well now. Breo 200/25mcg 1 puff daily has been helpful. Rarely using albuterol. Once every 2 weeks. Worse on high wind days. Has been able to be more active. She rarely will having episodes of coughing and helpful. Sometimes this occurs right away in the morning.     Not using nasal spray daily. Using visine as needed. No oral antihistamines. Allergies have been controlled. Rare ocular watering only.         Past Medical History:   Diagnosis Date     ASCUS favor benign 8/2015    Neg HPV     Depressive disorder      Family History   Adopted: Yes   Problem Relation Age of Onset     Other - See Comments Son         chron's     Crohn's Disease Son      Allergy (Severe) Son      Unknown/Adopted No family hx of         Unknown family history      Past Surgical History:   Procedure Laterality Date     C APPENDECTOMY  1978       REVIEW OF SYSTEMS:  General: negative for weight gain. negative for weight loss. negative for changes in " sleep.   Ears: negative for fullness. negative for hearing loss. negative for dizziness.   Nose: negative for snoring.negative for changes in smell. negative for drainage.   Eyes: negative for eye watering. negative for eye itching. negative for vision changes. negative for eye redness.  Throat: negative for hoarseness. negative for sore throat. negative for trouble swallowing.   Lungs: negative for shortness of breath.negative for wheezing. negative for sputum production.   Cardiovascular: negative for chest pain. negative for swelling of ankles. negative for fast or irregular heartbeat.   Gastrointestinal: negative for nausea. negative for heartburn. negative for acid reflux.   Musculoskeletal: negative for joint pain. negative for joint stiffness. negative for joint swelling.   Neurologic: negative for seizures. negative for fainting. negative for weakness.   Psychiatric: negative for changes in mood. negative for anxiety.   Endocrine: negative for cold intolerance. negative for heat intolerance. negative for tremors.   Lymphatic: negative for lower extremity swelling. negative for lymph node swelling.   Hematologic: negative for easy bruising. negative for easy bleeding.  Integumentary: negative for rash. negative for scaling. negative for nail changes.       Current Outpatient Medications:      albuterol (PROAIR HFA/PROVENTIL HFA/VENTOLIN HFA) 108 (90 Base) MCG/ACT inhaler, Inhale 2 puffs into the lungs every 4 hours as needed for wheezing, Disp: 18 g, Rfl: 3     escitalopram (LEXAPRO) 20 MG tablet, , Disp: , Rfl: 3     fluticasone (FLONASE) 50 MCG/ACT nasal spray, Spray 2 sprays into both nostrils daily, Disp: 16 g, Rfl: 3     fluticasone-vilanterol (BREO ELLIPTA) 200-25 MCG/INH inhaler, Inhale 1 puff into the lungs daily, Disp: 1 each, Rfl: 3     olopatadine (PATADAY) 0.2 % ophthalmic solution, Place 1 drop into both eyes daily, Disp: 1 Bottle, Rfl: 11     ipratropium - albuterol 0.5 mg/2.5 mg/3 mL (DUONEB)  0.5-2.5 (3) MG/3ML neb solution, Take 1 vial (3 mLs) by nebulization every 4 hours as needed for shortness of breath / dyspnea or wheezing, Disp: 42 vial, Rfl: 0  Immunization History   Administered Date(s) Administered     COVID-[place] Ornelas Janssen 05/14/2021     Influenza Vaccine IM > 6 months Valent IIV4 09/22/2016, 09/20/2017, 09/10/2018     Mantoux Tuberculin Skin Test 02/12/2014     TDAP Vaccine (Adacel) 06/12/2009     Allergies   Allergen Reactions     Azithromycin      Zithromax - Racing heart     Moxifloxacin Hydrochloride      Avelox - Labored breathing         EXAM:   Constitutional:  Appears well-developed and well-nourished. No distress.   HEENT:   Head: Normocephalic.   Neuro: Oriented to person, place, and time.  Skin: Skin is warm and dry. No rash noted.   Psychiatric: Normal mood and affect.     Nursing note and vitals reviewed.    ASSESSMENT/PLAN:  Problem List Items Addressed This Visit        Respiratory    Moderate persistent asthma without complication - Primary     Cough that is productive and has been present for years.  Albuterol is beneficial. Cold air causing chest tightness.      Started on Breo and helpful.      -Breo 200/25 mcg 1 puff inhaled daily.  -Albuterol 2 puffs inhaled every 4 hours as needed for coughing, wheezing, shortness of breath or tightness in chest.  -Albuterol nebulized treatment every 4 hours as needed for chest tightness, wheezing, coughing and/or shortness of breath.            Relevant Medications    fluticasone-vilanterol (BREO ELLIPTA) 200-25 MCG/INH inhaler    albuterol (PROAIR HFA/PROVENTIL HFA/VENTOLIN HFA) 108 (90 Base) MCG/ACT inhaler    Allergic rhinitis due to animal dander     Ocular watering, postnasal drainage and itching in the spring and fall.  Currently without symptoms.    Allergy testing positive for cat, dog, dust mite, grass, trees, weeds.     -Flonase 2 sprays per nostril daily if needed.   -Pataday 1 drop per eye daily as needed.  -Oral antihistamine  as needed.         Relevant Medications    fluticasone-vilanterol (BREO ELLIPTA) 200-25 MCG/INH inhaler    albuterol (PROAIR HFA/PROVENTIL HFA/VENTOLIN HFA) 108 (90 Base) MCG/ACT inhaler    Allergic rhinitis due to dust mite    Relevant Medications    fluticasone-vilanterol (BREO ELLIPTA) 200-25 MCG/INH inhaler    albuterol (PROAIR HFA/PROVENTIL HFA/VENTOLIN HFA) 108 (90 Base) MCG/ACT inhaler    Seasonal allergic rhinitis due to pollen    Relevant Medications    fluticasone-vilanterol (BREO ELLIPTA) 200-25 MCG/INH inhaler    albuterol (PROAIR HFA/PROVENTIL HFA/VENTOLIN HFA) 108 (90 Base) MCG/ACT inhaler          Chart documentation with Dragon Voice recognition Software. Although reviewed after completion, some words and grammatical errors may remain.    I have reviewed the note as documented above.  This accurately captures the substance of my conversation with the patient.    Video visit contact time  Video visit started at 1036  Video visit ended at 1041    Video-Visit Details     Type of service:  Video Visit     Originating Location (pt. Location): Home     Distant Location (provider location):  [facility]     Mode of Communication:  Video Conference via D[employer]    DO GLADYS Cordero  Allergy/Immunology  [facility] and [place], [place]

## 2021-06-15 NOTE — PATIENT INSTRUCTIONS
Allergy Staff Appt Hours Shot Hours Locations    Physician     Terrence Wyatt DO       Support Staff     ROSINA Sawant CMA  Tuesday:   [place] 7-5 Wednesday:  [place]: 7-5 Thursday:                    [place] 7-6     Friday:  [place]  7-2   [place]        Thursday: 7-5:20        Friday: 7-12:20     [place]        Tuesday: 7- 3:20 Wednesday: 7-4:20 [place] Monday: 7-2:20 Tuesday: 9-5:20         [facility]  [address] [place] [address]  Appt Line: [phone]      [facility]  [address] [place] [address]  Appt Line: [phone]         Important Scheduling Information  Aspirin Desensitization: Appt will last 2 clinic days. Please call the Allergy RN line for your clinic to schedule. Discontinue antihistamines 7 days prior to the appointment.     Food Challenges: Appt will last 3-4 hours. Please call the Allergy RN line for your clinic to schedule. Discontinue antihistamines 7 days prior to the appointment.     Penicillin Testing: Appt will last 2-3 hours. Please call the Allergy RN line for your clinic to schedule. Discontinue antihistamines 7 days prior to the appointment.     Skin Testing: Appt will about 40 minutes. Call the appointment line for your clinic to schedule. Discontinue antihistamines 7 days prior to the appointment.     Venom Testing: Appt will last 2-3 hours. Please call the Allergy RN line for your clinic to schedule. Discontinue antihistamines 7 days prior to the appointment.     Thank you for trusting us with your Allergy, Asthma, and Immunology care. Please feel free to contact us with any questions or concerns you may have.

## 2021-06-15 NOTE — ASSESSMENT & PLAN NOTE
Cough that is productive and has been present for years.  Albuterol is beneficial. Cold air causing chest tightness.      Started on Breo and helpful.      -Breo 200/25 mcg 1 puff inhaled daily.  -Albuterol 2 puffs inhaled every 4 hours as needed for coughing, wheezing, shortness of breath or tightness in chest.  -Albuterol nebulized treatment every 4 hours as needed for chest tightness, wheezing, coughing and/or shortness of breath.

## 2021-06-15 NOTE — LETTER
"    6/15/2021         RE: Neeta Verde  [address]  [place] [address]        Dear Colleague,    Thank you for referring your patient, Neeta Verde, to the [facility]. Please see a copy of my visit note below.    Neeta Verde is a 51 year old female who is being evaluated via a billable video visit.      The patient has been notified of following:      \"This video visit will be conducted via a call between you and your physician/provider. We have found that certain health care needs can be provided without the need for an in-person physical exam.  This service lets us provide the care you need with a video conversation.  If a prescription is necessary we can send it directly to your pharmacy.  If lab work is needed we can place an order for that and you can then stop by our lab to have the test done at a later time.     If during the course of the call the physician/provider feels a video visit is not appropriate, you will not be charged for this service.\"    Patient has given verbal consent for Video visit? Yes     Patient would like the video invitation sent by: [phone]     I have reviewed and updated the patient's Past Medical History, Social History, Family History and Medication List.    Doing well now. Breo 200/25mcg 1 puff daily has been helpful. Rarely using albuterol. Once every 2 weeks. Worse on high wind days. Has been able to be more active. She rarely will having episodes of coughing and helpful. Sometimes this occurs right away in the morning.     Not using nasal spray daily. Using visine as needed. No oral antihistamines. Allergies have been controlled. Rare ocular watering only.         Past Medical History:   Diagnosis Date     ASCUS favor benign 8/2015    Neg HPV     Depressive disorder      Family History   Adopted: Yes   Problem Relation Age of Onset     Other - See Comments Son         chron's     Crohn's Disease Son      Allergy (Severe) Son      " Unknown/Adopted No family hx of         Unknown family history      Past Surgical History:   Procedure Laterality Date     C APPENDECTOMY  1978       REVIEW OF SYSTEMS:  General: negative for weight gain. negative for weight loss. negative for changes in sleep.   Ears: negative for fullness. negative for hearing loss. negative for dizziness.   Nose: negative for snoring.negative for changes in smell. negative for drainage.   Eyes: negative for eye watering. negative for eye itching. negative for vision changes. negative for eye redness.  Throat: negative for hoarseness. negative for sore throat. negative for trouble swallowing.   Lungs: negative for shortness of breath.negative for wheezing. negative for sputum production.   Cardiovascular: negative for chest pain. negative for swelling of ankles. negative for fast or irregular heartbeat.   Gastrointestinal: negative for nausea. negative for heartburn. negative for acid reflux.   Musculoskeletal: negative for joint pain. negative for joint stiffness. negative for joint swelling.   Neurologic: negative for seizures. negative for fainting. negative for weakness.   Psychiatric: negative for changes in mood. negative for anxiety.   Endocrine: negative for cold intolerance. negative for heat intolerance. negative for tremors.   Lymphatic: negative for lower extremity swelling. negative for lymph node swelling.   Hematologic: negative for easy bruising. negative for easy bleeding.  Integumentary: negative for rash. negative for scaling. negative for nail changes.       Current Outpatient Medications:      albuterol (PROAIR HFA/PROVENTIL HFA/VENTOLIN HFA) 108 (90 Base) MCG/ACT inhaler, Inhale 2 puffs into the lungs every 4 hours as needed for wheezing, Disp: 18 g, Rfl: 3     escitalopram (LEXAPRO) 20 MG tablet, , Disp: , Rfl: 3     fluticasone (FLONASE) 50 MCG/ACT nasal spray, Spray 2 sprays into both nostrils daily, Disp: 16 g, Rfl: 3     fluticasone-vilanterol (BREO  ELLIPTA) 200-25 MCG/INH inhaler, Inhale 1 puff into the lungs daily, Disp: 1 each, Rfl: 3     olopatadine (PATADAY) 0.2 % ophthalmic solution, Place 1 drop into both eyes daily, Disp: 1 Bottle, Rfl: 11     ipratropium - albuterol 0.5 mg/2.5 mg/3 mL (DUONEB) 0.5-2.5 (3) MG/3ML neb solution, Take 1 vial (3 mLs) by nebulization every 4 hours as needed for shortness of breath / dyspnea or wheezing, Disp: 42 vial, Rfl: 0  Immunization History   Administered Date(s) Administered     COVID-19,PF,Raheem 05/14/2021     Influenza Vaccine IM > 6 months Valent IIV4 09/22/2016, 09/20/2017, 09/10/2018     Mantoux Tuberculin Skin Test 02/12/2014     TDAP Vaccine (Adacel) 06/12/2009     Allergies   Allergen Reactions     Azithromycin      Zithromax - Racing heart     Moxifloxacin Hydrochloride      Avelox - Labored breathing         EXAM:   Constitutional:  Appears well-developed and well-nourished. No distress.   HEENT:   Head: Normocephalic.   Neuro: Oriented to person, place, and time.  Skin: Skin is warm and dry. No rash noted.   Psychiatric: Normal mood and affect.     Nursing note and vitals reviewed.    ASSESSMENT/PLAN:  Problem List Items Addressed This Visit        Respiratory    Moderate persistent asthma without complication - Primary     Cough that is productive and has been present for years.  Albuterol is beneficial. Cold air causing chest tightness.      Started on Breo and helpful.      -Breo 200/25 mcg 1 puff inhaled daily.  -Albuterol 2 puffs inhaled every 4 hours as needed for coughing, wheezing, shortness of breath or tightness in chest.  -Albuterol nebulized treatment every 4 hours as needed for chest tightness, wheezing, coughing and/or shortness of breath.            Relevant Medications    fluticasone-vilanterol (BREO ELLIPTA) 200-25 MCG/INH inhaler    albuterol (PROAIR HFA/PROVENTIL HFA/VENTOLIN HFA) 108 (90 Base) MCG/ACT inhaler    Allergic rhinitis due to animal dander     Ocular watering, postnasal  drainage and itching in the spring and fall.  Currently without symptoms.    Allergy testing positive for cat, dog, dust mite, grass, trees, weeds.     -Flonase 2 sprays per nostril daily if needed.   -Pataday 1 drop per eye daily as needed.  -Oral antihistamine as needed.         Relevant Medications    fluticasone-vilanterol (BREO ELLIPTA) 200-25 MCG/INH inhaler    albuterol (PROAIR HFA/PROVENTIL HFA/VENTOLIN HFA) 108 (90 Base) MCG/ACT inhaler    Allergic rhinitis due to dust mite    Relevant Medications    fluticasone-vilanterol (BREO ELLIPTA) 200-25 MCG/INH inhaler    albuterol (PROAIR HFA/PROVENTIL HFA/VENTOLIN HFA) 108 (90 Base) MCG/ACT inhaler    Seasonal allergic rhinitis due to pollen    Relevant Medications    fluticasone-vilanterol (BREO ELLIPTA) 200-25 MCG/INH inhaler    albuterol (PROAIR HFA/PROVENTIL HFA/VENTOLIN HFA) 108 (90 Base) MCG/ACT inhaler          Chart documentation with Dragon Voice recognition Software. Although reviewed after completion, some words and grammatical errors may remain.    I have reviewed the note as documented above.  This accurately captures the substance of my conversation with the patient.    Video visit contact time  Video visit started at 1036  Video visit ended at 1041    Video-Visit Details     Type of service:  Video Visit     Originating Location (pt. Location): Home     Distant Location (provider location):  [facility]     Mode of Communication:  Video Conference via Doximity    Terrence Wyatt DO FAAAAI  Allergy/Immunology  [facility] and [place], [place]      Again, thank you for allowing me to participate in the care of your patient.        Sincerely,        Terrence Wyatt DO

## 2021-06-15 NOTE — ASSESSMENT & PLAN NOTE
Ocular watering, postnasal drainage and itching in the spring and fall.  Currently without symptoms.    Allergy testing positive for cat, dog, dust mite, grass, trees, weeds.     -Flonase 2 sprays per nostril daily if needed.   -Pataday 1 drop per eye daily as needed.  -Oral antihistamine as needed.

## 2021-08-28 ENCOUNTER — HEALTH MAINTENANCE LETTER (OUTPATIENT)
Age: 51
End: 2021-08-28

## 2021-09-03 DIAGNOSIS — J45.40 MODERATE PERSISTENT ASTHMA WITHOUT COMPLICATION: ICD-10-CM

## 2021-09-07 NOTE — TELEPHONE ENCOUNTER
Pending Prescriptions:                       Disp   Refills    fluticasone-vilanterol (BREO ELLIPTA) 200*1 each 3            Sig: Inhale 1 puff into the lungs daily    Routing refill request to provider for review/approval because:  No current ACT on file.    Jackie Pritchard RN

## 2021-09-07 NOTE — TELEPHONE ENCOUNTER
I will refill the medicine; however, I recommend a follow-up visit within the next 3 months.  Would like to see the patient face-to-face.    Clifton Trejo MD

## 2021-10-23 ENCOUNTER — HEALTH MAINTENANCE LETTER (OUTPATIENT)
Age: 51
End: 2021-10-23

## 2021-11-23 ENCOUNTER — TELEPHONE (OUTPATIENT)
Dept: ALLERGY | Facility: OTHER | Age: 51
End: 2021-11-23
Payer: COMMERCIAL

## 2021-11-24 NOTE — TELEPHONE ENCOUNTER
PA Initiation    Medication: fluticasone-vilanterol (BREO ELLIPTA) 200-25 MCG/INH inhaler  Insurance Company: [employer] - Phone [phone] Fax [phone]  Pharmacy Filling the Rx: [facility][id number] - [place], [place] - [address]  Filling Pharmacy Phone: [phone]  Filling Pharmacy Fax: [phone]  Start Date: 11/24/2021

## 2021-11-24 NOTE — TELEPHONE ENCOUNTER
Prior Authorization Approval    Authorization Effective Date: 10/25/2021  Authorization Expiration Date: 11/23/2024  Medication: fluticasone-vilanterol (BREO ELLIPTA) 200-25 MCG/INH inhaler  Approved Dose/Quantity:   Reference #: [id number]   Insurance Company: [employer] - Phone [phone] Fax [phone]  Which Pharmacy is filling the prescription (Not needed for infusion/clinic administered): [id number] - [place] - [address] AT [address] [address]  Pharmacy Notified: Yes  Patient Notified: Yes

## 2021-12-03 ENCOUNTER — MYC MEDICAL ADVICE (OUTPATIENT)
Dept: FAMILY MEDICINE | Facility: CLINIC | Age: 51
End: 2021-12-03
Payer: COMMERCIAL

## 2021-12-08 RX ORDER — ESCITALOPRAM OXALATE 20 MG/1
TABLET ORAL
Refills: 3 | OUTPATIENT
Start: 2021-12-08

## 2021-12-08 NOTE — TELEPHONE ENCOUNTER
I have not seen this patient since 5/2020 and the last 3 visits have been NO SHOWs.     I have never prescribed this medication for her.     Please have her contact Psychiatry for a refill until it can be addressed at her visit.   A covering provider should be available to refill her medications.     Kristen Kehr PA-C

## 2022-01-25 ENCOUNTER — OFFICE VISIT (OUTPATIENT)
Dept: FAMILY MEDICINE | Facility: CLINIC | Age: 52
End: 2022-01-25
Payer: COMMERCIAL

## 2022-01-25 VITALS
SYSTOLIC BLOOD PRESSURE: 130 MMHG | DIASTOLIC BLOOD PRESSURE: 82 MMHG | HEART RATE: 64 BPM | WEIGHT: 173 LBS | BODY MASS INDEX: 26.22 KG/M2 | OXYGEN SATURATION: 100 % | TEMPERATURE: 96.2 F | HEIGHT: 68 IN

## 2022-01-25 DIAGNOSIS — F32.5 MAJOR DEPRESSIVE DISORDER WITH SINGLE EPISODE, IN REMISSION (H): ICD-10-CM

## 2022-01-25 DIAGNOSIS — J45.40 MODERATE PERSISTENT ASTHMA WITHOUT COMPLICATION: ICD-10-CM

## 2022-01-25 DIAGNOSIS — Z12.31 VISIT FOR SCREENING MAMMOGRAM: ICD-10-CM

## 2022-01-25 DIAGNOSIS — Z00.8 ENCOUNTER FOR BIOMETRIC SCREENING: ICD-10-CM

## 2022-01-25 DIAGNOSIS — Z23 HIGH PRIORITY FOR 2019-NCOV VACCINE: ICD-10-CM

## 2022-01-25 DIAGNOSIS — Z00.00 ROUTINE GENERAL MEDICAL EXAMINATION AT A HEALTH CARE FACILITY: Primary | ICD-10-CM

## 2022-01-25 LAB
CHOLEST SERPL-MCNC: 217 MG/DL
FASTING STATUS PATIENT QL REPORTED: YES
FASTING STATUS PATIENT QL REPORTED: YES
GLUCOSE BLD-MCNC: 99 MG/DL (ref 70–99)
HDLC SERPL-MCNC: 90 MG/DL
LDLC SERPL CALC-MCNC: 111 MG/DL
NONHDLC SERPL-MCNC: 127 MG/DL
TRIGL SERPL-MCNC: 81 MG/DL

## 2022-01-25 PROCEDURE — 82947 ASSAY GLUCOSE BLOOD QUANT: CPT | Performed by: PHYSICIAN ASSISTANT

## 2022-01-25 PROCEDURE — 90471 IMMUNIZATION ADMIN: CPT | Performed by: PHYSICIAN ASSISTANT

## 2022-01-25 PROCEDURE — 80061 LIPID PANEL: CPT | Performed by: PHYSICIAN ASSISTANT

## 2022-01-25 PROCEDURE — 36415 COLL VENOUS BLD VENIPUNCTURE: CPT | Performed by: PHYSICIAN ASSISTANT

## 2022-01-25 PROCEDURE — 90715 TDAP VACCINE 7 YRS/> IM: CPT | Performed by: PHYSICIAN ASSISTANT

## 2022-01-25 PROCEDURE — 91305 COVID-19,PF,PFIZER (12+ YRS): CPT | Performed by: PHYSICIAN ASSISTANT

## 2022-01-25 PROCEDURE — 99396 PREV VISIT EST AGE 40-64: CPT | Mod: 25 | Performed by: PHYSICIAN ASSISTANT

## 2022-01-25 PROCEDURE — 99214 OFFICE O/P EST MOD 30 MIN: CPT | Mod: 25 | Performed by: PHYSICIAN ASSISTANT

## 2022-01-25 PROCEDURE — 0054A COVID-19,PF,PFIZER (12+ YRS): CPT | Performed by: PHYSICIAN ASSISTANT

## 2022-01-25 RX ORDER — ESCITALOPRAM OXALATE 20 MG/1
TABLET ORAL
Refills: 3 | Status: CANCELLED | OUTPATIENT
Start: 2022-01-25

## 2022-01-25 RX ORDER — ESCITALOPRAM OXALATE 20 MG/1
20 TABLET ORAL DAILY
Qty: 90 TABLET | Refills: 3 | Status: SHIPPED | OUTPATIENT
Start: 2022-01-25

## 2022-01-25 RX ORDER — ALBUTEROL SULFATE 90 UG/1
2 AEROSOL, METERED RESPIRATORY (INHALATION) EVERY 4 HOURS PRN
Qty: 18 G | Refills: 5 | Status: SHIPPED | OUTPATIENT
Start: 2022-01-25 | End: 2023-10-10

## 2022-01-25 ASSESSMENT — PAIN SCALES - GENERAL: PAINLEVEL: NO PAIN (0)

## 2022-01-25 ASSESSMENT — PATIENT HEALTH QUESTIONNAIRE - PHQ9
SUM OF ALL RESPONSES TO PHQ QUESTIONS 1-9: 1
5. POOR APPETITE OR OVEREATING: NOT AT ALL

## 2022-01-25 ASSESSMENT — ANXIETY QUESTIONNAIRES
1. FEELING NERVOUS, ANXIOUS, OR ON EDGE: NOT AT ALL
7. FEELING AFRAID AS IF SOMETHING AWFUL MIGHT HAPPEN: NOT AT ALL
6. BECOMING EASILY ANNOYED OR IRRITABLE: NOT AT ALL
5. BEING SO RESTLESS THAT IT IS HARD TO SIT STILL: NOT AT ALL
2. NOT BEING ABLE TO STOP OR CONTROL WORRYING: NOT AT ALL
GAD7 TOTAL SCORE: 0
3. WORRYING TOO MUCH ABOUT DIFFERENT THINGS: NOT AT ALL

## 2022-01-25 ASSESSMENT — MIFFLIN-ST. JEOR: SCORE: 1430.28

## 2022-01-25 ASSESSMENT — ASTHMA QUESTIONNAIRES: ACT_TOTALSCORE: 20

## 2022-01-25 NOTE — LETTER
My Asthma Action Plan    Name: Neeta Verde   YOB: 1970  Date: 1/25/2022   My doctor: Kristen M. Kehr, PA-C   My clinic: [facility]        My Control Medicine: Fluticasone furoate + vilanterol (Breo Ellipta)  -  200/25mcg daily  My Rescue Medicine: Albuterol (Proair/Ventolin/Proventil HFA) 2-4 puffs EVERY 4 HOURS as needed. Use a spacer if recommended by your provider.   My Asthma Severity:   Moderate Persistent  Know your asthma triggers: dust mites and pollens               GREEN ZONE   Good Control    I feel good    No cough or wheeze    Can work, sleep and play without asthma symptoms       Take your asthma control medicine every day.     1. If exercise triggers your asthma, take your rescue medication    15 minutes before exercise or sports, and    During exercise if you have asthma symptoms  2. Spacer to use with inhaler: If you have a spacer, make sure to use it with your inhaler             YELLOW ZONE Getting Worse  I have ANY of these:    I do not feel good    Cough or wheeze    Chest feels tight    Wake up at night   1. Keep taking your Green Zone medications  2. Start taking your rescue medicine:    every 20 minutes for up to 1 hour. Then every 4 hours for 24-48 hours.  3. If you stay in the Yellow Zone for more than 12-24 hours, contact your doctor.  4. If you do not return to the Green Zone in 12-24 hours or you get worse, start taking your oral steroid medicine if prescribed by your provider.           RED ZONE Medical Alert - Get Help  I have ANY of these:    I feel awful    Medicine is not helping    Breathing getting harder    Trouble walking or talking    Nose opens wide to breathe       1. Take your rescue medicine NOW  2. If your provider has prescribed an oral steroid medicine, start taking it NOW  3. Call your doctor NOW  4. If you are still in the Red Zone after 20 minutes and you have not reached your doctor:    Take your rescue medicine again  and    Call 911 or go to the emergency room right away    See your regular doctor within 2 weeks of an Emergency Room or Urgent Care visit for follow-up treatment.          Annual Reminders:  Meet with Asthma Educator,  Flu Shot in the Fall, consider Pneumonia Vaccination for patients with asthma (aged 19 and older).    Pharmacy:    [facility] - [place], [place] - [address]  [facility] [place] - [address] [address]  [id number] - [place] - [address]  [facility] [place] [place] - [address]  [facility] [place] [facility] - [place], [place] - [address]    Electronically signed by Kristen M. Kehr, PA-C   Date: 01/25/22                      Asthma Triggers  How To Control Things That Make Your Asthma Worse    Triggers are things that make your asthma worse.  Look at the list below to help you find your triggers and what you can do about them.  You can help prevent asthma flare-ups by staying away from your triggers.      Trigger                                                          What you can do   Cigarette Smoke  Tobacco smoke can make asthma worse. Do not allow smoking in your home, car or around you.  Be sure no one smokes at a child s day care or school.  If you smoke, ask your health care provider for ways to help you quit.  Ask family members to quit too.  Ask your health care provider for a referral to Quit Plan to help you quit smoking, or call 4-041-786-PLAN.     Colds, Flu, Bronchitis  These are common triggers of asthma. Wash your hands often.  Don t touch your eyes, nose or mouth.  Get a flu shot every year.     Dust Mites  These are tiny bugs that live in cloth or carpet. They are too small to see. Wash sheets and blankets in hot water every week.   Encase pillows and mattress in dust mite proof covers.  Avoid having carpet if you can. If  you have carpet, vacuum weekly.   Use a dust mask and HEPA vacuum.   Pollen and Outdoor Mold  Some people are allergic to trees, grass, or weed pollen, or molds. Try to keep your windows closed.  Limit time out doors when pollen count is high.   Ask you health care provider about taking medicine during allergy season.     Animal Dander  Some people are allergic to skin flakes, urine or saliva from pets with fur or feathers. Keep pets with fur or feathers out of your home.    If you can t keep the pet outdoors, then keep the pet out of your bedroom.  Keep the bedroom door closed.  Keep pets off cloth furniture and away from stuffed toys.     Mice, Rats, and Cockroaches   Some people are allergic to the waste from these pests.   Cover food and garbage.  Clean up spills and food crumbs.  Store grease in the refrigerator.   Keep food out of the bedroom.   Indoor Mold  This can be a trigger if your home has high moisture. Fix leaking faucets, pipes, or other sources of water.   Clean moldy surfaces.  Dehumidify basement if it is damp and smelly.   Smoke, Strong Odors, and Sprays  These can reduce air quality. Stay away from strong odors and sprays, such as perfume, powder, hair spray, paints, smoke incense, paint, cleaning products, candles and new carpet.   Exercise or Sports  Some people with asthma have this trigger. Be active!  Ask your doctor about taking medicine before sports or exercise to prevent symptoms.    Warm up for 5-10 minutes before and after sports or exercise.     Other Triggers of Asthma  Cold air:  Cover your nose and mouth with a scarf.  Sometimes laughing or crying can be a trigger.  Some medicines and food can trigger asthma.

## 2022-01-25 NOTE — PROGRESS NOTES
SUBJECTIVE:   CC: Neeta Verde is an 51 year old woman who presents for preventive health visit.       Patient has been advised of split billing requirements and indicates understanding: Yes  Healthy Habits:    Getting at least 3 servings of Calcium per day:  Yes    Bi-annual eye exam:  Yes    Dental care twice a year:  Yes    Sleep apnea or symptoms of sleep apnea:  None    Taking medications regularly:  No    Medication side effects:  None    PHQ-2 Total Score:    PROBLEMS TO ADD:  1. ASTHMA: managed with BREO and albuterol. No concerns / well controlled.   2. DEPRESSION: previously managed by Psychiatry. Her Psychiatry provider has moved and she has been taking the lexapro for years with good control. She is no longer in counseling.       Today's PHQ-2 Score:   PHQ-2 (  [employer]) 1/3/2022   Q1: Little interest or pleasure in doing things 0   Q2: Feeling down, depressed or hopeless 0   PHQ-2 Score 0   Q1: Little interest or pleasure in doing things Not at all   Q2: Feeling down, depressed or hopeless Not at all   PHQ-2 Score 0       Abuse: Current or Past (Physical, Sexual or Emotional) - No  Do you feel safe in your environment? Yes    Have you ever done Advance Care Planning? (For example, a Health Directive, POLST, or a discussion with a medical provider or your loved ones about your wishes): No, advance care planning information given to patient to review.  Patient declined advance care planning discussion at this time.    Social History     Tobacco Use     Smoking status: Former Smoker     Packs/day: 0.00     Years: 2.00     Pack years: 0.00     Types: Cigarettes     Start date: 1987     Quit date: 1989     Years since quittin.0     Smokeless tobacco: Never Used     Tobacco comment: Social Use only   Substance Use Topics     Alcohol use: Yes     Alcohol/week: 14.0 standard drinks     If you drink alcohol do you typically have >3 drinks per day or >7 drinks per week? No  Discussed alcohol use  and she is decreasing the amount.     No flowsheet data found.    Reviewed orders with patient.  Reviewed health maintenance and updated orders accordingly - Yes  BP Readings from Last 3 Encounters:   22 130/82   21 138/87   20 (!) 158/96    Wt Readings from Last 3 Encounters:   22 78.5 kg (173 lb)   21 81.6 kg (180 lb)   21 81.6 kg (180 lb)                  Patient Active Problem List   Diagnosis     Mild major depression (H)     Psychosis, brief reactive (H)     CARDIOVASCULAR SCREENING; LDL GOAL LESS THAN 160     ASCUS of cervix with negative high risk HPV     Moderate persistent asthma without complication     Allergic rhinitis due to animal dander     Allergic rhinitis due to dust mite     Seasonal allergic rhinitis due to pollen     Past Surgical History:   Procedure Laterality Date     [facility] APPENDECTOMY         Social History     Tobacco Use     Smoking status: Former Smoker     Packs/day: 0.00     Years: 2.00     Pack years: 0.00     Types: Cigarettes     Start date: 1987     Quit date: 1989     Years since quittin.0     Smokeless tobacco: Never Used     Tobacco comment: Social Use only   Substance Use Topics     Alcohol use: Yes     Alcohol/week: 14.0 standard drinks     Family History   Adopted: Yes   Problem Relation Age of Onset     Other - See Comments Son         chron's     Crohn's Disease Son      Allergy (Severe) Son      Unknown/Adopted No family hx of         Unknown family history          Current Outpatient Medications   Medication Sig Dispense Refill     albuterol (PROAIR HFA/PROVENTIL HFA/VENTOLIN HFA) 108 (90 Base) MCG/ACT inhaler Inhale 2 puffs into the lungs every 4 hours as needed for wheezing 18 g 5     escitalopram (LEXAPRO) 20 MG tablet Take 1 tablet (20 mg) by mouth daily 90 tablet 3     fluticasone-vilanterol (BREO ELLIPTA) 200-25 MCG/INH inhaler Inhale 1 puff into the lungs daily 1 each 5     fluticasone (FLONASE) 50 MCG/ACT nasal  spray Spray 2 sprays into both nostrils daily (Patient not taking: Reported on 1/25/2022) 16 g 3     Allergies   Allergen Reactions     Azithromycin      Zithromax - Racing heart     Moxifloxacin Hydrochloride      Avelox - Labored breathing     Recent Labs   Lab Test 12/30/20  1136 09/10/18  1701 12/19/16  1041   LDL  --  109* 142*   HDL  --  85 73   TRIG  --  62 91   ALT  --   --  26   CR 0.70  --  0.86   GFRESTIMATED >90  --  70   GFRESTBLACK >90  --  85   POTASSIUM 4.1  --  4.4        Breast Cancer Screening:  Any new diagnosis of family breast, ovarian, or bowel cancer? No    Breast CA Risk Assessment (FHS-7) 11/15/2021 11/15/2021 11/15/2021 11/15/2021 11/15/2021 11/15/2021 11/15/2021   Do you have a family history of breast, colon, or ovarian cancer? No / Unknown No / Unknown No / Unknown No / Unknown No / Unknown No / Unknown No / Unknown         Mammogram Screening: Recommended annual mammography  Pertinent mammograms are reviewed under the imaging tab.    History of abnormal Pap smear:   NO - age 30-65 PAP every 5 years with negative HPV co-testing recommended  Last 3 Pap and HPV Results:   PAP / HPV Latest Ref Rng & Units 9/10/2018 8/26/2015 7/10/2012   PAP (Historical) - NIL ASC-US(A) NIL   HPV16 NEG:Negative Negative Negative -   HPV18 NEG:Negative Negative Negative -   HRHPV NEG:Negative Negative Negative -     PAP / HPV Latest Ref Rng & Units 9/10/2018 8/26/2015 7/10/2012   PAP (Historical) - NIL ASC-US(A) NIL   HPV16 NEG:Negative Negative Negative -   HPV18 NEG:Negative Negative Negative -   HRHPV NEG:Negative Negative Negative -     Reviewed and updated as needed this visit by clinical staff  Tobacco  Allergies  Meds  Problems  Med Hx  Surg Hx  Fam Hx  Soc Hx         Reviewed and updated as needed this visit by Provider  Tobacco  Allergies  Meds  Problems  Med Hx  Surg Hx  Fam Hx        Past Medical History:   Diagnosis Date     ASCUS favor benign 8/2015    Neg HPV     Depressive  "disorder      Moderate persistent asthma without complication      Uncomplicated asthma 1/30/20    Went to ER and followup with Allergy Doc      Past Surgical History:   Procedure Laterality Date     [facility] APPENDECTOMY  1978     OB History   No obstetric history on file.       Review of Systems  CONSTITUTIONAL: NEGATIVE for fever, chills, change in weight  INTEGUMENTARY/SKIN: NEGATIVE for worrisome rashes, moles or lesions  EYES: NEGATIVE for vision changes or irritation  ENT: NEGATIVE for ear, mouth and throat problems  RESP: NEGATIVE for significant cough or SOB  BREAST: NEGATIVE for masses, tenderness or discharge  CV: NEGATIVE for chest pain, palpitations or peripheral edema  GI: NEGATIVE for nausea, abdominal pain, heartburn, or change in bowel habits  : NEGATIVE for unusual urinary or vaginal symptoms. No vaginal bleeding.  MUSCULOSKELETAL: NEGATIVE for significant arthralgias or myalgia  NEURO: NEGATIVE for weakness, dizziness or paresthesias  PSYCHIATRIC: NEGATIVE for changes in mood or affect      OBJECTIVE:   /82   Pulse 64   Temp (!) 96.2  F (35.7  C) (Tympanic)   Ht 1.715 m (5' 7.5\")   Wt 78.5 kg (173 lb)   LMP 07/29/2015 (Exact Date)   SpO2 100%   BMI 26.70 kg/m    Physical Exam  GENERAL APPEARANCE: healthy, alert and no distress  EYES: Eyes grossly normal to inspection, PERRL and conjunctivae and sclerae normal  HENT: ear canals and TM's normal, nose and mouth without ulcers or lesions, oropharynx clear and oral mucous membranes moist  NECK: no adenopathy, no asymmetry, masses, or scars and thyroid normal to palpation  RESP: lungs clear to auscultation - no rales, rhonchi or wheezes  BREAST: normal without masses, tenderness or nipple discharge and no palpable axillary masses or adenopathy  CV: regular rate and rhythm, normal S1 S2, no S3 or S4, no murmur, click or rub, no peripheral edema and peripheral pulses strong  ABDOMEN: soft, nontender, no hepatosplenomegaly, no masses and bowel " "sounds normal  MS: no musculoskeletal defects are noted and gait is age appropriate without ataxia  SKIN: no suspicious lesions or rashes  NEURO: Normal strength and tone, sensory exam grossly normal, mentation intact and speech normal  PSYCH: mentation appears normal and affect normal/bright    Diagnostic Test Results:  Labs reviewed in Epic    ASSESSMENT/PLAN:   (Z00.00) Routine general medical examination at a health care facility  (primary encounter diagnosis)  Comment: Health maintenance reviewed and updated.      (F32.5) Major depressive disorder with single episode, in remission (H)  Comment: she has been stable on her current medication for years.   Previously managed by Psychiatry. Her Psychiatrist has moved.   Refills given.   Plan: escitalopram (LEXAPRO) 20 MG tablet            (J45.40) Moderate persistent asthma without complication  Comment: stable, no longer has Allergy / Asthma provider.   Refill given.   Plan: albuterol (PROAIR HFA/PROVENTIL HFA/VENTOLIN         HFA) 108 (90 Base) MCG/ACT inhaler,         fluticasone-vilanterol (BREO ELLIPTA) 200-25         MCG/INH inhaler            (Z00.8) Encounter for biometric screening  She will send paperwork if she needs anything signed, but otherwise completes her form online.   Plan: GLUCOSE, Lipid panel reflex to direct LDL         Fasting            (Z12.31) Visit for screening mammogram  Comment: already ordered.     (Z23) High priority for 2019-nCoV vaccine      Patient has been advised of split billing requirements and indicates understanding: Yes    COUNSELING:  Reviewed preventive health counseling, as reflected in patient instructions       Regular exercise       Healthy diet/nutrition    Estimated body mass index is 26.7 kg/m  as calculated from the following:    Height as of this encounter: 1.715 m (5' 7.5\").    Weight as of this encounter: 78.5 kg (173 lb).    Weight management plan: Discussed healthy diet and exercise guidelines    She reports " that she quit smoking about 33 years ago. Her smoking use included cigarettes. She started smoking about 35 years ago. She smoked 0.00 packs per day for 2.00 years. She has never used smokeless tobacco.      Counseling Resources:  ATP IV Guidelines  Pooled Cohorts Equation Calculator  Breast Cancer Risk Calculator  BRCA-Related Cancer Risk Assessment: FHS-7 Tool  FRAX Risk Assessment  ICSI Preventive Guidelines  Dietary Guidelines for Americans, 2010  USDA's MyPlate  ASA Prophylaxis  Lung CA Screening    Kristen M. Kehr, PA-C  [facility]

## 2022-01-25 NOTE — NURSING NOTE
"Chief Complaint   Patient presents with     Physical       Initial BP (!) 161/98   Pulse 64   Temp (!) 96.2  F (35.7  C) (Tympanic)   Ht 1.715 m (5' 7.5\")   Wt 78.5 kg (173 lb)   LMP 07/29/2015 (Exact Date)   SpO2 100%   BMI 26.70 kg/m   Estimated body mass index is 26.7 kg/m  as calculated from the following:    Height as of this encounter: 1.715 m (5' 7.5\").    Weight as of this encounter: 78.5 kg (173 lb).  Medication Reconciliation: complete    KIMBERLY Madison MA    "

## 2022-01-26 ASSESSMENT — ANXIETY QUESTIONNAIRES: GAD7 TOTAL SCORE: 0

## 2022-02-23 ENCOUNTER — MYC MEDICAL ADVICE (OUTPATIENT)
Dept: FAMILY MEDICINE | Facility: CLINIC | Age: 52
End: 2022-02-23
Payer: COMMERCIAL

## 2022-02-23 DIAGNOSIS — J45.40 MODERATE PERSISTENT ASTHMA WITHOUT COMPLICATION: Primary | ICD-10-CM

## 2022-04-13 ENCOUNTER — OFFICE VISIT (OUTPATIENT)
Dept: ALLERGY | Facility: OTHER | Age: 52
End: 2022-04-13
Payer: COMMERCIAL

## 2022-04-13 VITALS
SYSTOLIC BLOOD PRESSURE: 140 MMHG | OXYGEN SATURATION: 100 % | WEIGHT: 170 LBS | HEIGHT: 68 IN | BODY MASS INDEX: 25.76 KG/M2 | DIASTOLIC BLOOD PRESSURE: 90 MMHG | HEART RATE: 59 BPM

## 2022-04-13 DIAGNOSIS — J30.81 ALLERGIC RHINITIS DUE TO ANIMAL DANDER: ICD-10-CM

## 2022-04-13 DIAGNOSIS — J30.89 ALLERGIC RHINITIS DUE TO DUST MITE: ICD-10-CM

## 2022-04-13 DIAGNOSIS — J30.1 SEASONAL ALLERGIC RHINITIS DUE TO POLLEN: ICD-10-CM

## 2022-04-13 DIAGNOSIS — Z23 NEED FOR PNEUMOCOCCAL VACCINATION: ICD-10-CM

## 2022-04-13 DIAGNOSIS — J45.40 MODERATE PERSISTENT ASTHMA WITHOUT COMPLICATION: Primary | ICD-10-CM

## 2022-04-13 LAB
BASOPHILS # BLD AUTO: 0 10E3/UL (ref 0–0.2)
BASOPHILS NFR BLD AUTO: 0 %
EOSINOPHIL # BLD AUTO: 0.2 10E3/UL (ref 0–0.7)
EOSINOPHIL NFR BLD AUTO: 2 %
ERYTHROCYTE [DISTWIDTH] IN BLOOD BY AUTOMATED COUNT: 12.2 % (ref 10–15)
HCT VFR BLD AUTO: 42.1 % (ref 35–47)
HGB BLD-MCNC: 14 G/DL (ref 11.7–15.7)
LYMPHOCYTES # BLD AUTO: 2.3 10E3/UL (ref 0.8–5.3)
LYMPHOCYTES NFR BLD AUTO: 32 %
MCH RBC QN AUTO: 32.6 PG (ref 26.5–33)
MCHC RBC AUTO-ENTMCNC: 33.3 G/DL (ref 31.5–36.5)
MCV RBC AUTO: 98 FL (ref 78–100)
MONOCYTES # BLD AUTO: 0.8 10E3/UL (ref 0–1.3)
MONOCYTES NFR BLD AUTO: 11 %
NEUTROPHILS # BLD AUTO: 4 10E3/UL (ref 1.6–8.3)
NEUTROPHILS NFR BLD AUTO: 55 %
PLATELET # BLD AUTO: 259 10E3/UL (ref 150–450)
RBC # BLD AUTO: 4.3 10E6/UL (ref 3.8–5.2)
WBC # BLD AUTO: 7.3 10E3/UL (ref 4–11)

## 2022-04-13 PROCEDURE — 99214 OFFICE O/P EST MOD 30 MIN: CPT | Mod: 25 | Performed by: ALLERGY & IMMUNOLOGY

## 2022-04-13 PROCEDURE — 82785 ASSAY OF IGE: CPT | Performed by: ALLERGY & IMMUNOLOGY

## 2022-04-13 PROCEDURE — 85025 COMPLETE CBC W/AUTO DIFF WBC: CPT | Performed by: ALLERGY & IMMUNOLOGY

## 2022-04-13 PROCEDURE — 36415 COLL VENOUS BLD VENIPUNCTURE: CPT | Performed by: ALLERGY & IMMUNOLOGY

## 2022-04-13 PROCEDURE — 90732 PPSV23 VACC 2 YRS+ SUBQ/IM: CPT | Performed by: ALLERGY & IMMUNOLOGY

## 2022-04-13 PROCEDURE — 90471 IMMUNIZATION ADMIN: CPT | Performed by: ALLERGY & IMMUNOLOGY

## 2022-04-13 RX ORDER — FEXOFENADINE HCL AND PSEUDOEPHEDRINE HCL 180; 240 MG/1; MG/1
1 TABLET, EXTENDED RELEASE ORAL DAILY
COMMUNITY
End: 2022-04-14

## 2022-04-13 RX ORDER — AZELASTINE 1 MG/ML
2 SPRAY, METERED NASAL 2 TIMES DAILY PRN
Qty: 30 ML | Refills: 3 | Status: SHIPPED | OUTPATIENT
Start: 2022-04-13 | End: 2023-10-10

## 2022-04-13 ASSESSMENT — ENCOUNTER SYMPTOMS
MYALGIAS: 0
FEVER: 0
ARTHRALGIAS: 0
VOMITING: 0
EYE REDNESS: 0
EYE DISCHARGE: 0
SINUS PRESSURE: 0
SHORTNESS OF BREATH: 0
NAUSEA: 0
RHINORRHEA: 0
FACIAL SWELLING: 0
EYE ITCHING: 0
HEADACHES: 0
CHILLS: 0
ABDOMINAL PAIN: 0
CHEST TIGHTNESS: 0
ACTIVITY CHANGE: 0
ADENOPATHY: 0
DIARRHEA: 0
WHEEZING: 0

## 2022-04-13 ASSESSMENT — ASTHMA QUESTIONNAIRES: ACT_TOTALSCORE: 23

## 2022-04-13 NOTE — PROGRESS NOTES
Cbc  SUBJECTIVE:                                                                   Neeta Verde is a 52-year-old female who presents today to our Allergy Clinic at [facility]; She is being seen in consultation at the request of Kristen M Kehr, PA-C, for asthma evaluation.  She was previously seen by Dr. Wyatt.    History of asthma manifested by chest tightness, shortness of breath, and cough triggered by cold air and exposure to pets.  She also has a history of cough due to the postnasal drainage.  Previously, she was on Advair and it was helpful.  When she saw Dr. Wyatt, her symptoms were not well controlled, and she was started on Brio Ellipta 200/25 mcg 1 puff once daily, which was significantly helpful.  Whenever she has chest symptoms, most of the time, albuterol is acutely helpful.  In 2014, she had an office spirometry done that did not suggest an obstruction.      History of itchy/watery eyes and postnasal drainage in Spring and Fall.  May develop nasal congestion with cat exposure.  SPT for aeroallergens performed in 2021, suggest sensitivity to cat, dog, dust mites, grass pollen (Bc), tree pollen, and weed pollen.  I personally reviewed and independently interpreted the results.    From allergic rhinitis standpoint, she is doing well. She started fexofenadine about 1 week ago. Doesn't use nasal sprays. Feels that she doesn't need nasal spray. Avoids cats. Doesn't need to use eye drops.     She uses Breo 200/25 mcg 1 puff once daily. Neeta is using albuterol HFA  less than twice per week for rescue from chest symptoms. She is waking up less than twice per month due to chest symptoms. There has been no use of oral steroids since the last visit. No ED/PCP/urgent care/other specialist visits for asthma flare since the previous visit. The patient denies current chest tightness, cough, wheeze, or shortness of breath.   She wonders if she needs to be on Breo. On the other hand, she  tried stopping the Breo at some point for several weeks, and chest tightness and shortness of breath returned.      Patient Active Problem List   Diagnosis     Mild major depression (H)     Psychosis, brief reactive (H)     CARDIOVASCULAR SCREENING; LDL GOAL LESS THAN 160     ASCUS of cervix with negative high risk HPV     Moderate persistent asthma without complication     Allergic rhinitis due to animal dander     Allergic rhinitis due to dust mite     Seasonal allergic rhinitis due to pollen       Past Medical History:   Diagnosis Date     ASCUS favor benign 2015    Neg HPV     Depressive disorder      Moderate persistent asthma without complication      Uncomplicated asthma 20    Went to ER and followup with Allergy Doc      *Patient is Adopted       Problem (# of Occurrences) Relation (Name,Age of Onset)    Allergy (Severe) (1) Son    Crohn's Disease (1) Son    Other - See Comments (1) Son: chron's       Negative family history of: Unknown/Adopted, Asthma        Past Surgical History:   Procedure Laterality Date     [facility] APPENDECTOMY       Social History     Socioeconomic History     Marital status:      Spouse name: None     Number of children: None     Years of education: None     Highest education level: None   Occupational History     Comment: [employer]   Tobacco Use     Smoking status: Former Smoker     Packs/day: 0.00     Years: 2.00     Pack years: 0.00     Types: Cigarettes     Start date: 1987     Quit date: 1989     Years since quittin.3     Smokeless tobacco: Never Used     Tobacco comment: Social Use only   Vaping Use     Vaping Use: Never used   Substance and Sexual Activity     Alcohol use: Yes     Alcohol/week: 14.0 standard drinks     Drug use: No     Sexual activity: Yes     Partners: Male     Birth control/protection: Post-menopausal   Other Topics Concern     Parent/sibling w/ CABG, MI or angioplasty before 65F 55M? No   Social History Narrative    ENVIRONMENTAL  HISTORY: [place] The home is heated with a forced air and gas furnace. They do have central air conditioning. The patient's bedroom is furnished with carpeting in bedroom and fabric window coverings.  Pets inside the house include 2 dog(s). There is no history of cockroach or mice infestation. There is/are 0 smokers in the house.  The house does not have a damp basement.            Review of Systems   Constitutional: Negative for activity change, chills and fever.   HENT: Positive for postnasal drip. Negative for congestion, ear discharge, facial swelling, nosebleeds, rhinorrhea, sinus pressure and sneezing.    Eyes: Negative for discharge, redness and itching.   Respiratory: Negative for chest tightness, shortness of breath and wheezing.    Cardiovascular: Negative for chest pain.   Gastrointestinal: Negative for abdominal pain, diarrhea, nausea and vomiting.   Musculoskeletal: Negative for arthralgias and myalgias.   Skin: Negative for rash.   Allergic/Immunologic: Positive for environmental allergies.   Neurological: Negative for headaches.   Hematological: Negative for adenopathy.   Psychiatric/Behavioral: Negative for behavioral problems and self-injury.           Current Outpatient Medications:      azelastine (ASTELIN) 0.1 % nasal spray, Spray 2 sprays into both nostrils 2 times daily as needed for rhinitis, Disp: 30 mL, Rfl: 3     escitalopram (LEXAPRO) 20 MG tablet, Take 1 tablet (20 mg) by mouth daily, Disp: 90 tablet, Rfl: 3     fexofenadine (ALLEGRA) 180 MG tablet, Take 1 tablet (180 mg) by mouth daily, Disp: , Rfl:      fluticasone-vilanterol (BREO ELLIPTA) 200-25 MCG/INH inhaler, Inhale 1 puff into the lungs daily, Disp: 1 each, Rfl: 5     albuterol (PROAIR HFA/PROVENTIL HFA/VENTOLIN HFA) 108 (90 Base) MCG/ACT inhaler, Inhale 2 puffs into the lungs every 4 hours as needed for wheezing (Patient not taking: Reported on 4/13/2022), Disp: 18 g, Rfl: 5      "fluticasone (FLONASE) 50 MCG/ACT nasal spray, Spray 2 sprays into both nostrils daily (Patient not taking: Reported on 4/13/2022), Disp: 16 g, Rfl: 3  Immunization History   Administered Date(s) Administered     COVID-19,PF,Raheem 05/14/2021     COVID-19,PF,Pfizer 12+ Yrs (2022 and After) 01/25/2022     Influenza Vaccine IM > 6 months Valent IIV4 (Alfuria,Fluzone) 09/22/2016, 09/20/2017, 09/10/2018     Mantoux Tuberculin Skin Test 02/12/2014     Pneumococcal 23 valent 04/13/2022     TDAP Vaccine (Adacel) 06/12/2009     Tdap (Adacel,Boostrix) 01/25/2022     Allergies   Allergen Reactions     Azithromycin      Zithromax - Racing heart     Moxifloxacin Hydrochloride      Avelox - Labored breathing     OBJECTIVE:                                                                 BP (!) 140/90   Pulse 59   Ht 1.715 m (5' 7.5\")   Wt 77.1 kg (170 lb)   LMP 07/29/2015 (Exact Date)   SpO2 100%   BMI 26.23 kg/m          Physical Exam  Vitals and nursing note reviewed.   Constitutional:       General: She is not in acute distress.     Appearance: She is not ill-appearing, toxic-appearing or diaphoretic.   HENT:      Head: Normocephalic and atraumatic.      Right Ear: Tympanic membrane, ear canal and external ear normal.      Left Ear: Tympanic membrane, ear canal and external ear normal.      Nose: No congestion or rhinorrhea.      Right Turbinates: Not enlarged, swollen or pale.      Left Turbinates: Not enlarged, swollen or pale.      Mouth/Throat:      Lips: Pink.      Mouth: Mucous membranes are moist.      Pharynx: Oropharynx is clear. No pharyngeal swelling, oropharyngeal exudate, posterior oropharyngeal erythema or uvula swelling.   Eyes:      General:         Right eye: No discharge.         Left eye: No discharge.      Conjunctiva/sclera: Conjunctivae normal.   Cardiovascular:      Rate and Rhythm: Normal rate and regular rhythm.      Heart sounds: Normal heart sounds.   Pulmonary:      Effort: Pulmonary effort is " normal. No respiratory distress.      Breath sounds: Normal breath sounds and air entry. No stridor, decreased air movement or transmitted upper airway sounds. No decreased breath sounds, wheezing, rhonchi or rales.   Neurological:      Mental Status: She is alert and oriented to person, place, and time.   Psychiatric:         Mood and Affect: Mood normal.         Behavior: Behavior normal.           ASSESSMENT/PLAN:    Moderate persistent asthma without complication  Currently symptoms are well controlled with Breo Ellipta 200/25 mcg 1 puff once daily albuterol as needed.  -Continue as is.  -Ordered interval spirometry.  Depending on the results and symptoms, if the patient is interested, consider stepdown to lower dose of Breo.  -Ordered CBC with differential and total serum IgE (hypereosinophilia?  Elevated total IgE?)      - fluticasone-vilanterol (BREO ELLIPTA) 200-25 MCG/INH inhaler  Dispense: 1 each; Refill: 5  - General PFT Lab (Please always keep checked)  - Pulmonary Function Test  - IgE  - CBC with Platelets & Differential      Allergic rhinitis due to animal dander  Allergic rhinitis due to dust mite  Seasonal allergic rhinitis due to pollen  Currently well controlled with fexofenadine 180 mg by mouth once daily.  -Continue as is.  -Use azelastine 2 sprays in each nostril twice daily as needed for breakthrough symptoms.  If symptoms persist despite medications and allergen avoidance, or if medications are not tolerated, allergen immunotherapy is recommended.   We briefly discussed allergen immunotherapy today.    - azelastine (ASTELIN) 0.1 % nasal spray  Dispense: 30 mL; Refill: 3          Need for pneumococcal vaccination    - PPSV23, IM/SUBQ (2+ YRS) - [id number]       Return in about 6 months (around 10/13/2022), or if symptoms worsen or fail to improve.    Thank you for allowing us to participate in the care of this patient. Please feel free to contact us if there are any questions or concerns about  the patient.    Disclaimer: This note consists of symbols derived from keyboarding, dictation and/or voice recognition software. As a result, there may be errors in the script that have gone undetected. Please consider this when interpreting information found in this chart.    Clifton Trejo MD, FAAAAI, FACAAI  Allergy, Asthma and Immunology     [facility]

## 2022-04-13 NOTE — PROGRESS NOTES
Prior to immunization administration, verified patients identity using patient s name and date of birth. Please see Immunization Activity for additional information.     Screening Questionnaire for Adult Immunization    Are you sick today?   No   Do you have allergies to medications, food, a vaccine component or latex?   No   Have you ever had a serious reaction after receiving a vaccination?   No   Do you have a long-term health problem with heart, lung, kidney, or metabolic disease (e.g., diabetes), asthma, a blood disorder, no spleen, complement component deficiency, a cochlear implant, or a spinal fluid leak?  Are you on long-term aspirin therapy?   Yes   Do you have cancer, leukemia, HIV/AIDS, or any other immune system problem?   No   Do you have a parent, brother, or sister with an immune system problem?   No   In the past 3 months, have you taken medications that affect  your immune system, such as prednisone, other steroids, or anticancer drugs; drugs for the treatment of rheumatoid arthritis, Crohn s disease, or psoriasis; or have you had radiation treatments?   No   Have you had a seizure, or a brain or other nervous system problem?   No   During the past year, have you received a transfusion of blood or blood    products, or been given immune (gamma) globulin or antiviral drug?   No   For women: Are you pregnant or is there a chance you could become       pregnant during the next month?   No   Have you received any vaccinations in the past 4 weeks?   No     Immunization questionnaire was positive for at least one answer.  Notified Dr. Trejo.        Per orders of Dr. Trejo, injection of [id number] given by Stacey Torrez MA. Patient instructed to remain in clinic for 15 minutes afterwards, and to report any adverse reaction to me immediately.       Screening performed by Stacey Torrez MA on 4/13/2022 at 4:08 PM.

## 2022-04-13 NOTE — NURSING NOTE
Writer demonstrated how to use an HFA inhaler with a chamber for patient.  Patient instructed to shake the inhaler, empty lungs, put the inhaler chamber in mouth, push down on the inhaler and breathe in at the same time and then hold breath for 10 seconds.  RN informed patient that if an audible whistle/hum is heard from chamber, they are to slow their breathing.  Patient advised to wait 30 seconds-1 minute between puffs.  Patient instructed on how to clean chamber, take the medication canister out, wash it in warm soapy water, rinse it and then let it dry over night.  RN advised pt to refer to handout with chamber for cleaning instructions.  Patient informed the chamber needs to be washed once a week or when he notices a powder buildup.  Patient verbalized understanding.     Jackie Pritchard RN

## 2022-04-13 NOTE — PATIENT INSTRUCTIONS
Get the breathing test done.  Do not use albuterol on the day of the breathing test, if possible.         Continue with Breo 200/25 mcg 1 puff once daily as is. rinse/gargle/spit water after use.    -Continue using albuterol inhaler 2-4 puffs every 4 hours as needed for chest tightness/wheezing/shortness of breath/persistent cough.    Continue with fexofenadine 180 mg by mouth once daily as needed.  -Use azelastine 2 sprays in each nostril twice a day when necessary.       Consider allergen immunotherapy.           Allergy Staff Appt Hours Shot Hours Locations    Physician     Clifton Trejo MD       Support Staff     Jackie WALDEN, ROSINA WALDEN CMA  Tuesday:   [facility]: :         [place]:  [place]        Tuesday: : : : :: :[facility] [place]       Tues & Wed: : & Thurs: :       Fri: :         [facility]  [address] [place] [address]  Appt Line: [phone]    [facility]  [address] [place] [address]  Appt Line: [phone]    Pulmonary Function Scheduling:  [place]: [phone]  [place]: [phone]  [phone]     Prescription Assistance    If you need assistance with your prescriptions (cost, coverage, etc) please contact: [place] [facility] [phone]    Important Scheduling Information    Appointments for skin testing: Appointment will last approximately 45 minutes.  Please call the appointment line for your clinic to schedule.  Discontinue oral antihistamines 7 days prior to the appointment.  Discontinue nasal and ocular antihistamines 4 days prior to appointment.    Appointments for challenges (oral food challenge, penicillin testing, aspirin desensitization) If your provider instructs you to that this additional testing is indicated,  it will need to be scheduled directly through the allergy department.  Please contact them via [facility] or by calling the clinic and asking to speak with the allergy team.  They will provide additional information and instructions for the appointment.  Discontinue oral antihistamines 7 days prior to the appointment.  Discontinue nasal and ocular antihistamines 4 days prior to the appointment.    Thank you for trusting us with your care. Please feel free to contact us with any questions or concerns you may have.

## 2022-04-14 RX ORDER — FEXOFENADINE HCL 180 MG/1
180 TABLET ORAL DAILY
COMMUNITY
Start: 2022-04-14 | End: 2024-03-12

## 2022-04-16 LAB — IGE SERPL-ACNC: 34 KU/L (ref 0–114)

## 2022-04-18 NOTE — RESULT ENCOUNTER NOTE
[place] message sent:     CBC with differential is within normal limits.  Total serum IgE is within normal limits as well.  - No changes in the plan that we set up last week.

## 2022-05-18 NOTE — LETTER
4/13/2022         RE: Neeta Verde  [address]  [facility] [place] [facility] [address]        Dear Colleague,    Thank you for referring your patient, Neeta Verde, to the [facility]. Please see a copy of my visit note below.    Cbc  SUBJECTIVE:                                                                   Neeta Verde is a 52-year-old female who presents today to our Allergy Clinic at [facility]; She is being seen in consultation at the request of Kristen M Kehr, PA-C, for asthma evaluation.  She was previously seen by Dr. Wyatt.    History of asthma manifested by chest tightness, shortness of breath, and cough triggered by cold air and exposure to pets.  She also has a history of cough due to the postnasal drainage.  Previously, she was on Advair and it was helpful.  When she saw Dr. Wyatt, her symptoms were not well controlled, and she was started on Brio Ellipta 200/25 mcg 1 puff once daily, which was significantly helpful.  Whenever she has chest symptoms, most of the time, albuterol is acutely helpful.  In 2014, she had an office spirometry done that did not suggest an obstruction.      History of itchy/watery eyes and postnasal drainage in Spring and Fall.  May develop nasal congestion with cat exposure.  SPT for aeroallergens performed in 2021, suggest sensitivity to cat, dog, dust mites, grass pollen (Bc), tree pollen, and weed pollen.  I personally reviewed and independently interpreted the results.    From allergic rhinitis standpoint, she is doing well. She started fexofenadine about 1 week ago. Doesn't use nasal sprays. Feels that she doesn't need nasal spray. Avoids cats. Doesn't need to use eye drops.     She uses Breo 200/25 mcg 1 puff once daily. Neeta is using albuterol HFA  less than twice per week for rescue from chest symptoms. She is waking up less than twice per month due to chest symptoms. There has been no use of oral steroids since the last  visit. No ED/PCP/urgent care/other specialist visits for asthma flare since the previous visit. The patient denies current chest tightness, cough, wheeze, or shortness of breath.   She wonders if she needs to be on Breo. On the other hand, she tried stopping the Breo at some point for several weeks, and chest tightness and shortness of breath returned.      Patient Active Problem List   Diagnosis     Mild major depression (H)     Psychosis, brief reactive (H)     CARDIOVASCULAR SCREENING; LDL GOAL LESS THAN 160     ASCUS of cervix with negative high risk HPV     Moderate persistent asthma without complication     Allergic rhinitis due to animal dander     Allergic rhinitis due to dust mite     Seasonal allergic rhinitis due to pollen       Past Medical History:   Diagnosis Date     ASCUS favor benign 2015    Neg HPV     Depressive disorder      Moderate persistent asthma without complication      Uncomplicated asthma 20    Went to ER and followup with Allergy Doc      *Patient is Adopted       Problem (# of Occurrences) Relation (Name,Age of Onset)    Allergy (Severe) (1) Son    Crohn's Disease (1) Son    Other - See Comments (1) Son: chron's       Negative family history of: Unknown/Adopted, Asthma        Past Surgical History:   Procedure Laterality Date     ZZC APPENDECTOMY       Social History     Socioeconomic History     Marital status:      Spouse name: None     Number of children: None     Years of education: None     Highest education level: None   Occupational History     Comment: [employer]   Tobacco Use     Smoking status: Former Smoker     Packs/day: 0.00     Years: 2.00     Pack years: 0.00     Types: Cigarettes     Start date: 1987     Quit date: 1989     Years since quittin.3     Smokeless tobacco: Never Used     Tobacco comment: Social Use only   Vaping Use     Vaping Use: Never used   Substance and Sexual Activity     Alcohol use: Yes     Alcohol/week: 14.0 standard drinks      Drug use: No     Sexual activity: Yes     Partners: Male     Birth control/protection: Post-menopausal   Other Topics Concern     Parent/sibling w/ CABG, MI or angioplasty before 65F 55M? No   Social History Narrative    ENVIRONMENTAL HISTORY: [place] The home is heated with a forced air and gas furnace. They do have central air conditioning. The patient's bedroom is furnished with carpeting in bedroom and fabric window coverings.  Pets inside the house include 2 dog(s). There is no history of cockroach or mice infestation. There is/are 0 smokers in the house.  The house does not have a damp basement.            Review of Systems   Constitutional: Negative for activity change, chills and fever.   HENT: Positive for postnasal drip. Negative for congestion, ear discharge, facial swelling, nosebleeds, rhinorrhea, sinus pressure and sneezing.    Eyes: Negative for discharge, redness and itching.   Respiratory: Negative for chest tightness, shortness of breath and wheezing.    Cardiovascular: Negative for chest pain.   Gastrointestinal: Negative for abdominal pain, diarrhea, nausea and vomiting.   Musculoskeletal: Negative for arthralgias and myalgias.   Skin: Negative for rash.   Allergic/Immunologic: Positive for environmental allergies.   Neurological: Negative for headaches.   Hematological: Negative for adenopathy.   Psychiatric/Behavioral: Negative for behavioral problems and self-injury.           Current Outpatient Medications:      azelastine (ASTELIN) 0.1 % nasal spray, Spray 2 sprays into both nostrils 2 times daily as needed for rhinitis, Disp: 30 mL, Rfl: 3     escitalopram (LEXAPRO) 20 MG tablet, Take 1 tablet (20 mg) by mouth daily, Disp: 90 tablet, Rfl: 3     fexofenadine (ALLEGRA) 180 MG tablet, Take 1 tablet (180 mg) by mouth daily, Disp: , Rfl:      fluticasone-vilanterol (BREO ELLIPTA) 200-25 MCG/INH inhaler, Inhale 1 puff into the lungs daily, Disp: 1  "each, Rfl: 5     albuterol (PROAIR HFA/PROVENTIL HFA/VENTOLIN HFA) 108 (90 Base) MCG/ACT inhaler, Inhale 2 puffs into the lungs every 4 hours as needed for wheezing (Patient not taking: Reported on 4/13/2022), Disp: 18 g, Rfl: 5     fluticasone (FLONASE) 50 MCG/ACT nasal spray, Spray 2 sprays into both nostrils daily (Patient not taking: Reported on 4/13/2022), Disp: 16 g, Rfl: 3  Immunization History   Administered Date(s) Administered     COVID-19,PF,Raheem 05/14/2021     COVID-19,PF,Pfizer 12+ Yrs (2022 and After) 01/25/2022     Influenza Vaccine IM > 6 months Valent IIV4 (Alfuria,Fluzone) 09/22/2016, 09/20/2017, 09/10/2018     Mantoux Tuberculin Skin Test 02/12/2014     Pneumococcal 23 valent 04/13/2022     TDAP Vaccine (Adacel) 06/12/2009     Tdap (Adacel,Boostrix) 01/25/2022     Allergies   Allergen Reactions     Azithromycin      Zithromax - Racing heart     Moxifloxacin Hydrochloride      Avelox - Labored breathing     OBJECTIVE:                                                                 BP (!) 140/90   Pulse 59   Ht 1.715 m (5' 7.5\")   Wt 77.1 kg (170 lb)   LMP 07/29/2015 (Exact Date)   SpO2 100%   BMI 26.23 kg/m          Physical Exam  Vitals and nursing note reviewed.   Constitutional:       General: She is not in acute distress.     Appearance: She is not ill-appearing, toxic-appearing or diaphoretic.   HENT:      Head: Normocephalic and atraumatic.      Right Ear: Tympanic membrane, ear canal and external ear normal.      Left Ear: Tympanic membrane, ear canal and external ear normal.      Nose: No congestion or rhinorrhea.      Right Turbinates: Not enlarged, swollen or pale.      Left Turbinates: Not enlarged, swollen or pale.      Mouth/Throat:      Lips: Pink.      Mouth: Mucous membranes are moist.      Pharynx: Oropharynx is clear. No pharyngeal swelling, oropharyngeal exudate, posterior oropharyngeal erythema or uvula swelling.   Eyes:      General:         Right eye: No discharge.    "      Left eye: No discharge.      Conjunctiva/sclera: Conjunctivae normal.   Cardiovascular:      Rate and Rhythm: Normal rate and regular rhythm.      Heart sounds: Normal heart sounds.   Pulmonary:      Effort: Pulmonary effort is normal. No respiratory distress.      Breath sounds: Normal breath sounds and air entry. No stridor, decreased air movement or transmitted upper airway sounds. No decreased breath sounds, wheezing, rhonchi or rales.   Neurological:      Mental Status: She is alert and oriented to person, place, and time.   Psychiatric:         Mood and Affect: Mood normal.         Behavior: Behavior normal.           ASSESSMENT/PLAN:    Moderate persistent asthma without complication  Currently symptoms are well controlled with Breo Ellipta 200/25 mcg 1 puff once daily albuterol as needed.  -Continue as is.  -Ordered interval spirometry.  Depending on the results and symptoms, if the patient is interested, consider stepdown to lower dose of Breo.  -Ordered CBC with differential and total serum IgE (hypereosinophilia?  Elevated total IgE?)      - fluticasone-vilanterol (BREO ELLIPTA) 200-25 MCG/INH inhaler  Dispense: 1 each; Refill: 5  - General PFT Lab (Please always keep checked)  - Pulmonary Function Test  - IgE  - CBC with Platelets & Differential      Allergic rhinitis due to animal dander  Allergic rhinitis due to dust mite  Seasonal allergic rhinitis due to pollen  Currently well controlled with fexofenadine 180 mg by mouth once daily.  -Continue as is.  -Use azelastine 2 sprays in each nostril twice daily as needed for breakthrough symptoms.  If symptoms persist despite medications and allergen avoidance, or if medications are not tolerated, allergen immunotherapy is recommended.   We briefly discussed allergen immunotherapy today.    - azelastine (ASTELIN) 0.1 % nasal spray  Dispense: 30 mL; Refill: 3          Need for pneumococcal vaccination    - PPSV23, IM/SUBQ (2+ YRS) - [id number]        Return in about 6 months (around 10/13/2022), or if symptoms worsen or fail to improve.    Thank you for allowing us to participate in the care of this patient. Please feel free to contact us if there are any questions or concerns about the patient.    Disclaimer: This note consists of symbols derived from keyboarding, dictation and/or voice recognition software. As a result, there may be errors in the script that have gone undetected. Please consider this when interpreting information found in this chart.    Clifton Trejo MD, FAAAAI, FACAAI  Allergy, Asthma and Immunology     [facility]     Prior to immunization administration, verified patients identity using patient s name and date of birth. Please see Immunization Activity for additional information.     Screening Questionnaire for Adult Immunization    Are you sick today?   No   Do you have allergies to medications, food, a vaccine component or latex?   No   Have you ever had a serious reaction after receiving a vaccination?   No   Do you have a long-term health problem with heart, lung, kidney, or metabolic disease (e.g., diabetes), asthma, a blood disorder, no spleen, complement component deficiency, a cochlear implant, or a spinal fluid leak?  Are you on long-term aspirin therapy?   Yes   Do you have cancer, leukemia, HIV/AIDS, or any other immune system problem?   No   Do you have a parent, brother, or sister with an immune system problem?   No   In the past 3 months, have you taken medications that affect  your immune system, such as prednisone, other steroids, or anticancer drugs; drugs for the treatment of rheumatoid arthritis, Crohn s disease, or psoriasis; or have you had radiation treatments?   No   Have you had a seizure, or a brain or other nervous system problem?   No   During the past year, have you received a transfusion of blood or blood    products, or been given immune  (gamma) globulin or antiviral drug?   No   For women: Are you pregnant or is there a chance you could become       pregnant during the next month?   No   Have you received any vaccinations in the past 4 weeks?   No     Immunization questionnaire was positive for at least one answer.  Notified Dr. Trejo.        Per orders of Dr. Trejo, injection of [id number] given by Stacey Torrez MA. Patient instructed to remain in clinic for 15 minutes afterwards, and to report any adverse reaction to me immediately.       Screening performed by Stacey Torrez MA on 4/13/2022 at 4:08 PM.        Again, thank you for allowing me to participate in the care of your patient.        Sincerely,        Clifton Trejo MD     Terbinafine Counseling: Patient counseling regarding adverse effects of terbinafine including but not limited to headache, diarrhea, rash, upset stomach, liver function test abnormalities, itching, taste/smell disturbance, nausea, abdominal pain, and flatulence.  There is a rare possibility of liver failure that can occur when taking terbinafine.  The patient understands that a baseline LFT and kidney function test may be required. The patient verbalized understanding of the proper use and possible adverse effects of terbinafine.  All of the patient's questions and concerns were addressed.

## 2022-07-26 ENCOUNTER — TELEPHONE (OUTPATIENT)
Dept: ALLERGY | Facility: OTHER | Age: 52
End: 2022-07-26

## 2022-07-26 NOTE — TELEPHONE ENCOUNTER
Prior Authorization Retail Medication Request    Medication/Dose: fluticasone-salmeterol (AIRDUO RESPICLICK) 232-14 MCG/ACT inhaler  ICD code (if different than what is on RX):    Previously Tried and Failed:    Rationale:      Insurance Name:    Insurance ID:        Pharmacy Information (if different than what is on RX)  Name:    Phone:

## 2022-07-27 NOTE — TELEPHONE ENCOUNTER
Central Prior Authorization Team   Phone: [phone]      PA Initiation    Medication: fluticasone-salmeterol (AIRDUO RESPICLICK) 232-14 MCG/ACT inhaler--INITIATED  Insurance Company: [employer] - Phone [phone] Fax [phone]  Pharmacy Filling the Rx: [facility] [place] - [place], [place] - [address] [facility]  Filling Pharmacy Phone: [phone]  Filling Pharmacy Fax:    Start Date: 7/27/2022

## 2022-07-29 NOTE — TELEPHONE ENCOUNTER
Central Prior Authorization Team   Phone: [phone]      CALLED INS ON STATUS UPDATE ON PA. IT IS STILL UNDER REVIEW

## 2022-08-02 NOTE — TELEPHONE ENCOUNTER
Central Prior Authorization Team   Phone: [phone]        CALLED INS ON STATUS UPDATE ON PA. IT IS STILL UNDER REVIEW, SHOULD HEAR BACK IN 24-48 HRS

## 2022-08-02 NOTE — TELEPHONE ENCOUNTER
Central Prior Authorization Team   Phone: [phone]      PRIOR AUTHORIZATION DENIED    Medication: fluticasone-salmeterol (AIRDUO RESPICLICK) 232-14 MCG/ACT inhaler--DENIED    Denial Date: 8/2/2022    Denial Rational:   PREF FLUTICASONE-SALMETEROL,WIXELA INHUB,ADVAIR HFA,BREO ELLIPTA,DULERA,SYMBICORT      Appeal Information:        PLEASE CLOSE ENCOUNTER IF NO APPEAL IS TRIED

## 2022-08-03 NOTE — TELEPHONE ENCOUNTER
Please contact the patient.  Has she tried good Rx zully?  I frequently see patients paying out-of-pocket for this medicine less than the regular co-pay for a medication.  If she is not willing to try, we can try Wixela.      Clifton Trejo MD

## 2022-08-03 NOTE — TELEPHONE ENCOUNTER
Spoke with patient.  She will try GoodRx and let us know if unable to get med affordably.    Jackie Pritchard RN

## 2022-08-16 ENCOUNTER — TRANSFERRED RECORDS (OUTPATIENT)
Dept: HEALTH INFORMATION MANAGEMENT | Facility: CLINIC | Age: 52
End: 2022-08-16

## 2022-08-19 ENCOUNTER — MYC MEDICAL ADVICE (OUTPATIENT)
Dept: ALLERGY | Facility: OTHER | Age: 52
End: 2022-08-19

## 2022-08-19 DIAGNOSIS — J45.40 MODERATE PERSISTENT ASTHMA WITHOUT COMPLICATION: ICD-10-CM

## 2022-08-19 RX ORDER — FLUTICASONE PROPIONATE AND SALMETEROL 232; 14 UG/1; UG/1
1 POWDER, METERED RESPIRATORY (INHALATION) 2 TIMES DAILY
Qty: 1 EACH | Refills: 4 | Status: SHIPPED | OUTPATIENT
Start: 2022-08-19 | End: 2023-08-15

## 2022-08-23 ENCOUNTER — NURSE TRIAGE (OUTPATIENT)
Dept: FAMILY MEDICINE | Facility: CLINIC | Age: 52
End: 2022-08-23

## 2022-08-23 NOTE — TELEPHONE ENCOUNTER
Recommend pt be seen by C at [facility].  Will need to coordinate tomorrow morning.   C can potentially help access acute psych care.

## 2022-08-23 NOTE — TELEPHONE ENCOUNTER
RN returned call to Rodney. See Consent to Communicate form giving authorization to discuss PHI with Rodney, scanned on 2/5/2014.     RN relayed provider recommendation that pt be seen by [facility] [place] tomorrow. RN advised that a time cannot be offered until the [facility] reviews pt's chart and message tomorrow morning.    Pt is with Rodney at the time of the call. RN also spoke to pt. Pt was aware of the call made on her behalf by Rodney. Pt agrees with provider recommendation to meet with the [facility] [place] tomorrow morning. Pt states she needs to be home from 12pm-2pm tomorrow for a work-related need.    Pt can be reached at [phone] in the morning to coordinate visit with [facility]. Pt states okay to notify Rodney when appointment has been scheduled so he can attend the appointment with her.    Please call Rodney at [phone] when the appointment has been scheduled as he states he plans to attend the appointment with pt.    KATARINA GrayN, RN
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
## 2022-08-24 ENCOUNTER — TELEPHONE (OUTPATIENT)
Dept: BEHAVIORAL HEALTH | Facility: CLINIC | Age: 52
End: 2022-08-24

## 2022-08-24 ENCOUNTER — HOSPITAL ENCOUNTER (EMERGENCY)
Facility: CLINIC | Age: 52
Discharge: HOME | End: 2022-08-24
Attending: EMERGENCY MEDICINE | Admitting: EMERGENCY MEDICINE
Payer: COMMERCIAL

## 2022-08-24 ENCOUNTER — HOSPITAL ENCOUNTER (EMERGENCY)
Facility: CLINIC | Age: 52
Discharge: HOME | End: 2022-08-25
Attending: FAMILY MEDICINE | Admitting: FAMILY MEDICINE
Payer: COMMERCIAL

## 2022-08-24 ENCOUNTER — OFFICE VISIT (OUTPATIENT)
Dept: BEHAVIORAL HEALTH | Facility: CLINIC | Age: 52
End: 2022-08-24
Payer: COMMERCIAL

## 2022-08-24 VITALS
TEMPERATURE: 97.7 F | SYSTOLIC BLOOD PRESSURE: 140 MMHG | RESPIRATION RATE: 14 BRPM | OXYGEN SATURATION: 98 % | HEART RATE: 89 BPM | DIASTOLIC BLOOD PRESSURE: 102 MMHG

## 2022-08-24 DIAGNOSIS — F33.3 SEVERE RECURRENT MAJOR DEPRESSIVE DISORDER WITH PSYCHOTIC FEATURES (H): Primary | ICD-10-CM

## 2022-08-24 DIAGNOSIS — F23: ICD-10-CM

## 2022-08-24 DIAGNOSIS — F41.9 ANXIETY: ICD-10-CM

## 2022-08-24 DIAGNOSIS — F10.20 ALCOHOL USE DISORDER, SEVERE, IN CONTROLLED ENVIRONMENT (H): ICD-10-CM

## 2022-08-24 DIAGNOSIS — F32.A DEPRESSION, UNSPECIFIED DEPRESSION TYPE: ICD-10-CM

## 2022-08-24 DIAGNOSIS — F48.9 MENTAL HEALTH PROBLEM: ICD-10-CM

## 2022-08-24 LAB
ANION GAP SERPL CALCULATED.3IONS-SCNC: 6 MMOL/L (ref 3–14)
BASOPHILS # BLD AUTO: 0 10E3/UL (ref 0–0.2)
BASOPHILS NFR BLD AUTO: 0 %
BUN SERPL-MCNC: 14 MG/DL (ref 7–30)
CALCIUM SERPL-MCNC: 8.9 MG/DL (ref 8.5–10.1)
CHLORIDE BLD-SCNC: 106 MMOL/L (ref 94–109)
CO2 SERPL-SCNC: 27 MMOL/L (ref 20–32)
CREAT SERPL-MCNC: 0.67 MG/DL (ref 0.52–1.04)
EOSINOPHIL # BLD AUTO: 0 10E3/UL (ref 0–0.7)
EOSINOPHIL NFR BLD AUTO: 0 %
ERYTHROCYTE [DISTWIDTH] IN BLOOD BY AUTOMATED COUNT: 12.1 % (ref 10–15)
GFR SERPL CREATININE-BSD FRML MDRD: >90 ML/MIN/1.73M2
GLUCOSE BLD-MCNC: 107 MG/DL (ref 70–99)
HCT VFR BLD AUTO: 45.1 % (ref 35–47)
HGB BLD-MCNC: 15.5 G/DL (ref 11.7–15.7)
IMM GRANULOCYTES # BLD: 0 10E3/UL
IMM GRANULOCYTES NFR BLD: 0 %
LYMPHOCYTES # BLD AUTO: 0.9 10E3/UL (ref 0.8–5.3)
LYMPHOCYTES NFR BLD AUTO: 13 %
MCH RBC QN AUTO: 33.1 PG (ref 26.5–33)
MCHC RBC AUTO-ENTMCNC: 34.4 G/DL (ref 31.5–36.5)
MCV RBC AUTO: 96 FL (ref 78–100)
MONOCYTES # BLD AUTO: 0.6 10E3/UL (ref 0–1.3)
MONOCYTES NFR BLD AUTO: 8 %
NEUTROPHILS # BLD AUTO: 5.7 10E3/UL (ref 1.6–8.3)
NEUTROPHILS NFR BLD AUTO: 79 %
NRBC # BLD AUTO: 0 10E3/UL
NRBC BLD AUTO-RTO: 0 /100
PLATELET # BLD AUTO: 276 10E3/UL (ref 150–450)
POTASSIUM BLD-SCNC: 3.9 MMOL/L (ref 3.4–5.3)
RBC # BLD AUTO: 4.68 10E6/UL (ref 3.8–5.2)
SARS-COV-2 RNA RESP QL NAA+PROBE: NEGATIVE
SODIUM SERPL-SCNC: 139 MMOL/L (ref 133–144)
TSH SERPL DL<=0.005 MIU/L-ACNC: 2.63 MU/L (ref 0.4–4)
WBC # BLD AUTO: 7.3 10E3/UL (ref 4–11)

## 2022-08-24 PROCEDURE — 250N000013 HC RX MED GY IP 250 OP 250 PS 637: Performed by: FAMILY MEDICINE

## 2022-08-24 PROCEDURE — 90791 PSYCH DIAGNOSTIC EVALUATION: CPT

## 2022-08-24 PROCEDURE — 90839 PSYTX CRISIS INITIAL 60 MIN: CPT

## 2022-08-24 PROCEDURE — 99284 EMERGENCY DEPT VISIT MOD MDM: CPT

## 2022-08-24 PROCEDURE — 99284 EMERGENCY DEPT VISIT MOD MDM: CPT | Performed by: EMERGENCY MEDICINE

## 2022-08-24 PROCEDURE — 36415 COLL VENOUS BLD VENIPUNCTURE: CPT | Performed by: EMERGENCY MEDICINE

## 2022-08-24 PROCEDURE — 87635 SARS-COV-2 COVID-19 AMP PRB: CPT | Performed by: EMERGENCY MEDICINE

## 2022-08-24 PROCEDURE — 99285 EMERGENCY DEPT VISIT HI MDM: CPT | Mod: 25

## 2022-08-24 PROCEDURE — C9803 HOPD COVID-19 SPEC COLLECT: HCPCS | Performed by: FAMILY MEDICINE

## 2022-08-24 PROCEDURE — 90840 PSYTX CRISIS EA ADDL 30 MIN: CPT

## 2022-08-24 PROCEDURE — 99285 EMERGENCY DEPT VISIT HI MDM: CPT | Mod: 25 | Performed by: FAMILY MEDICINE

## 2022-08-24 PROCEDURE — 80048 BASIC METABOLIC PNL TOTAL CA: CPT | Performed by: EMERGENCY MEDICINE

## 2022-08-24 PROCEDURE — 99220 PR INITIAL OBSERVATION CARE,LEVEL III: CPT | Performed by: FAMILY MEDICINE

## 2022-08-24 PROCEDURE — 85025 COMPLETE CBC W/AUTO DIFF WBC: CPT | Performed by: EMERGENCY MEDICINE

## 2022-08-24 PROCEDURE — 84443 ASSAY THYROID STIM HORMONE: CPT | Performed by: EMERGENCY MEDICINE

## 2022-08-24 PROCEDURE — C9803 HOPD COVID-19 SPEC COLLECT: HCPCS

## 2022-08-24 RX ORDER — ARIPIPRAZOLE 5 MG/1
5 TABLET ORAL DAILY
Status: DISCONTINUED | OUTPATIENT
Start: 2022-08-24 | End: 2022-08-25 | Stop reason: HOSPADM

## 2022-08-24 RX ORDER — OLANZAPINE 5 MG/1
5 TABLET, ORALLY DISINTEGRATING ORAL ONCE
Status: COMPLETED | OUTPATIENT
Start: 2022-08-24 | End: 2022-08-24

## 2022-08-24 RX ADMIN — OLANZAPINE 5 MG: 5 TABLET, ORALLY DISINTEGRATING ORAL at 23:48

## 2022-08-24 RX ADMIN — ARIPIPRAZOLE 5 MG: 5 TABLET ORAL at 23:49

## 2022-08-24 ASSESSMENT — ACTIVITIES OF DAILY LIVING (ADL)
ADLS_ACUITY_SCORE: 35

## 2022-08-24 NOTE — ED TRIAGE NOTES
Brought in by spouse with suicidal thoughts. Very emotionally closed. States she does not have a plan.     Triage Assessment     Row Name 08/24/22 0849       Triage Assessment (Adult)    Airway WDL WDL       Respiratory WDL    Respiratory WDL WDL       Skin Circulation/Temperature WDL    Skin Circulation/Temperature WDL WDL       Cardiac WDL    Cardiac WDL WDL       Peripheral/Neurovascular WDL    Peripheral Neurovascular WDL WDL       Cognitive/Neuro/Behavioral WDL    Cognitive/Neuro/Behavioral WDL speech    Speech clear;slow

## 2022-08-24 NOTE — CONSULTS
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 least 7-8 hours of sleep per night    --Create a list 10--20 healthy activities that you can do that are enjoyable and do not involve substance use    --Create daily goals (approx. 1-4 goals) per day and work to achieve them throughout the day.         [place] [facility] ([facility])  [facility] connects people seeking recovery to resources that help foster and sustain long-term recovery. Whether you are seeking resources for treatment, transportation, housing, job training, education, health care or other pathways to recovery, [facility] is a great place to start.    Phone: 824.474.2063. www.[facility].[place] (Great listing of all types of recovery  and non-recovery related resources)              Alcoholics Anonymous    Phone: 1-597-ALCOHOL    Website: HTTP://WWW.AA.ORG/         [facility] [place] (259-727-1944 or http://a[place].org)    [facility] [place] (099-488-2404 or www.aastpaul.org)         Narcotics Anonymous    Phone: 125.743.1966    Website: www.[employer].                   [employer] [address], [place], [place],    Phone: [phone]    Drop-in Hours: Monday-Friday 9-11:30 am. By appointment at other times.    Provides: [facility] is a drop-in center on the [place] [address] [facility] that provides a safe space for individuals who are homeless and have a history of chemical use. Sobriety is not a requirement but drugs and alcohol are not allowed on the property.    Services: Non-clients can access drop-in services such as Recovery and Harm Reduction Groups, referrals to case management, community activities, shower facilities, and a pool table. Individuals who are homeless and have chemical health needs may be eligible for enrollment into [facility]'s case management program. Clients and  work together to access benefits, treatment, health care, shelter, and external housing resources.         [facility]    [address], [place]    Phone: [phone]    Hours: Monday-Friday 8 am-5 pm.    Provides: Rule 25 assessment and referral for individuals seeking treatment or counseling for chemical dependency. Must be a resident of [facility]. There is no fee for assessment. There is some funding available for treatment programs.         [facility]    [address] [facility] Phone: [phone] Main: [phone]    Hours: Monday through Friday 7 am-5 pm    Provides: Daily drop-in Rule 25 assessments and weekly mental health assessments and services. Outpatient chemical dependency treatment (with sober recovery housing), relapse  "prevention, case management, and employment services. Outpatient and residential treatment for women with children. Specializes in assisting individuals with a history of relapse who face multiple barriers to achieving stable recovery.    Remarks: No charge for most services or services can be billed through insurance. Gender-specific services and treatment for co-occurring substance abuse and mental health concerns offered.        Crisis Lines  Crisis Text Line  Text 205217  You will be connected with a trained live crisis counselor to provide support.    Por espanol, texto  TOMÁS a 843830 o texto a 442-AYUDAME en WhatsApp    [facility] (LGBTQ Youth Crisis Line)  3.681.098.7260  text START to 463-015      Community Resources  Fast Tracker  Linking people to mental health and substance use disorder resources  [employer].org     [place] [facility] Warm Line  Peer to peer support  Monday thru Saturday, 12 pm to 10 pm  198.834.1595 or 1.793.158.6168  Text \"Support\" to 86398    [employer] [place] [employer]ness (RUCHI)  687.604.4825 or 1.888.RUCHI.HELPS      Mental Health Apps  My3  https://my3app.org/    VirtualHopeBox  https://Frogmetrics.org/apps/virtual-hope-box/                        "

## 2022-08-24 NOTE — ED TRIAGE NOTES
Per  patient was sent over by her therapist for med adjustment, currently on citalopram but not helping manage her anxiety and depression.      Triage Assessment     Row Name 08/24/22 4665       Triage Assessment (Adult)    Airway WDL WDL       Respiratory WDL    Respiratory WDL WDL       Cardiac WDL    Cardiac WDL WDL
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
## 2022-08-24 NOTE — TELEPHONE ENCOUNTER
[facility]pcion Mays[facility] and .  See her documentation.     Milagro Tolentino BSN, RN

## 2022-08-24 NOTE — DISCHARGE INSTRUCTIONS
Please follow up with your primary care clinic in [place] to start outpatient evaluation treatment for both mixed mood disorder (depression with anxiety) and evaluation for a chemical dependency(alcohol).    You should also be receiving a phone call in follow-up from the mental health staff that she spoke with virtually today.    Please return to the emergency department if you have any additional concerns or if safety becomes a concern at home.    No change in medications at this time.    All blood work  was normal.      DEC Aftercare Plan  If I am feeling unsafe or I am in a crisis, I will:   Contact my established care providers   Call the National Suicide Prevention Lifeline: 988  Go to the nearest emergency room   Call 911     Warning signs that I or other people might notice when a crisis is developing for me:   Sudden increase in anxiety or stress  Thoughts of suicide or physical self harm    Things I am able to do on my own to cope or help me feel better:   Maintain daily schedule  Engage in personal hobby or relaxing activity      Things that I am able to do with others to cope or help me better:   Ask for help as needed  Share thoughts and feelings     Changes I can make to support my mental health and wellness:   Engage with outpatient therapist for support with anxiety and substance use concerns  Follow through with psychiatry provider for additional support and medication management services     People in my life that I can ask for help:   Spouse  Outpatient providers  County crisis workers    Your [facility] has a mental health crisis team you can call 24/7: [facility] Mobile Crisis Response Team (CRT) [phone] or [phone]       Additional resources and information:     Additional Information  1-Today you were seen by a licensed mental health professional through Triage and Transition services, [facility] ([facility])  for a crisis assessment in the Emergency  Department at [facility].  It is recommended that you follow up with your established providers (psychiatrist, mental health therapist, and/or primary care doctor - as relevant) as soon as possible. Coordinators from [facility] will be calling you in the next 24-48 hours to ensure that you have the resources you need.  You can also contact [facility] coordinators directly at [phone]. You may have been scheduled for or offered an appointment with a mental health provider. [facility] maintains an extensive network of licensed behavioral health providers to connect patients with the services they need.  We do not charge providers a fee to participate in our referral network.  We match patients with providers based on a patient's specific needs, insurance coverage, and location.  Our first effort will be to refer you to a provider within your care system, and will utilize providers outside your care system as needed.      You have been referred to the [facility]. Our staff will contact you to schedule.- if you choose to engage in these virtual services you can contact the number listed. This outpatient service can provide short-term, VIRTUAL sessions ( therapy or psychiatry) until you begin scheduled services with your long-term provider(s). If you need to contact the Transition Clinic, please call [phone].      2-Coping skills for anxiety:    Stop and Breathe     When anxiety flares, take a time out and think about what it is that is making you so nervous. Anxiety is typically experienced as worrying about a future or past event.     For example, you may be worried that something bad is going to happen in the future. Perhaps you continually feel upset over an event that has already occurred. Regardless of what you are worried about, a big part of the problem is that you are not being mindful of the present moment.     Anxiety loses its  when you clear your mind of worry and bring your awareness  back to the present.     The next time your anxiety starts to take you out of the present, regain control by sitting down and taking a few easy breaths. Simply stopping and breathing can help restore a sense of personal balance and bring you back to the present moment. However, if you have the time, try taking this activity a little further and experiment with a breathing exercise and mantra.          Figure Out What's Bothering You     The physical symptoms of panic and anxiety, such as trembling, chest pain, and rapid heartbeat, are usually more apparent than understanding just what is making you anxious. However, in order to get to the root of your anxiety, you need to figure out what s bothering you. To get to the bottom of your anxiety, put some time aside to exploring your thoughts and feelings.     Writing in a journal can be a great way to get in touch with your sources of anxiety. If anxious feelings seem to be keeping you up at night, try keeping a journal or notepad next to your bed. Write down all of the things that are bothering you. Talking with a friend can be another way to discover and understand your anxious feelings.1     Make it a habit to regularly uncover and express your feelings of anxiety.          Focus On What You Can Change     Many times anxiety stems from fearing things that haven t even happened and may never occur. For example, even though everything is okay, you may still worry about potential issues, such as losing your job, becoming ill, or the safety of your loved ones.     Life can be unpredictable and no matter how hard you try, you can t always control what happens. However, you can decide how you are going to deal with the unknown. You can turn your anxiety into a source of strength by letting go of fear and focusing on gratitude.          Replace your fears by changing your attitude about them. For example, stop fearing to lose your job and instead focus on how grateful you  are to have a job. Come to work determined to do your best. Instead of fearing your loved one's safety, spend time with them, or express your appreciation of them. With a little practice, you can learn to dump your anxiety and  a more positive outlook.     At times, your anxiety may actually be caused by a real circumstance in your life. Perhaps you re in a situation where it is realistic to be worried about losing your job due to high company layoffs or talks of downsizing.     When anxiety is identified as being caused by a current problem, then taking action may be the answer to reducing your anxiety.4? For example, you may need to start job searching or scheduling interviews after work.     By being more proactive, you can feel like you have a bit more control over your situation.          Focus on Something Less Anxiety-Provoking     At times, it may be most helpful to simply redirect yourself to focus on something other than your anxiety. You may want to reach out to others, do some work around your home, or engage in an enjoyable activity or hobby. Here are a few ideas of things you can do to thwart off anxiety:     Do some chores or organizing around the house.     Engage in a creative activity, such as drawing, painting, or writing.     Go for a walk or engage in some other form of physical exercise.     Listen to music.     [place] or meditate.     Read a good book or watch a funny movie.     Most people are familiar with experiencing some anxiety from time-to-time. However, chronic anxiety can be a sign of a diagnosable anxiety disorder.     When anxiety affects one s relationships, work performance, and other areas of life, there is potential that these anxious feelings are actually an indication of mental health illness.     If you are experiencing anxiety and panic symptoms, talk with your doctor or other professionals who treat panic disorder. They will be able to address any concerns you have,  provide information on diagnosis, and discuss your treatment options.         3-Substance Abuse Resources    To access substance abuse treatment you must have an assessment complete or have an updated assessment within 30 days of starting any program.  Information for this to be completed and to secure funding if you have medical assistance or no insurance can be found through your [facility]'s chemical health intake line.    If you have private insurance, call the customer service number on the back of your insurance card to find an in-network provider for substance abuse assessment. The ideal provider will be a treatment facility, licensed in the [facility].    For those with Medicaid with the [facility], you will need a Rule 25 assessment: The following are phone numbers for each county. Rule 25 assessments must be completed by the county you reside in currently. Once approved for funding you can connect with a facility that does Rule 25 assessment.  [place] - [phone]  [facility] [phone]  [facility] [phone]  [facility] [phone]  [facility] [phone]  [facility] [phone]  [place] - [phone]  [facility] [phone]    The following facilities offer Rule 25/chemical health assessments:    [facility]  [phone]  Mon-Friday: [address] [facility], [address]  Saturday:  [address] [facility] [address]  M-F assessments (7a-1:45p); Saturday assessments (7:45-10:45a)    [facility]  [phone]  [address] [place], [place], [address]  *by appointment only M-W; walk ins available Fridays from 10-2.    [facility]  [phone] (phone consultation available 24/7)  In-person Assessments:  [address], [address], [place] [address]  [address] [address] [place] [address]  [address] [place] [address]  [address] [place] [address]     [facility]  [phone]  [address] [facility]  [place], [place], [address]  *walk in and  appointments available by calling    [facility]  [phone] [address], [place], [address]  *by appointment only M-Th    [facility]  [phone]  [address] [place], [place], [address]  *walk ins available M-F    [facility]  [phone]  [address] [place], [place], [address]  *available by appointments only      [facility]  [phone]  [address] [place], [place], [address]  *available by appointment only      [facility]  [phone]  [facility]  [address] [place], [place], [address]    [facility] (AD[facility])  [phone]  [address] [place] [address], [address]  *Walk in assessments also available M-F starting at 8 am.    [facility]  [phone]  [address] [place], [place], [address]    *available by appointments      [facility]  [phone]  [address] [place], [place], [address]  *walk in assessments available M-F starting at 7 am.    [facility]  [phone]  [facility]  [address] [place], [place], [address]  *Walk in assessments availble M-F starting at 8 am OR [phone]    [facility]  [address] [place] [address]  *Walk in assessments available M-F starting at 8 am    [facility]  [phone]  [address] [place] [address]    [facility]  [phone]  [address] [place], [place], [address]    *available by appointment only    [facility]  [phone]  [address] [facility] [place], [address]    [facility]  [phone]  [address] [place], [place], [address]  *available by appointment    [facility]  [phone]  [address] [place], [place], [address]  *walk ins available M-TH from 9-3    [facility]  [phone]  [address],  [place], [address]    [facility]  [phone]  [address] [facility], [place] [address]  [address] [address], [place] [address]    [facility] (Does Rule 25 Assessments)  [email or web address]  [phone]  [address], [place] [address]    [facility]  [email or web address]-alcohol-treatment  [phone], [address], [place] [address]  [phone], [address], [place], [place] [address] [phone], [address] [facility], [address], [place], [place] [address] [phone], [address], ([facility]), [place], [place] [address] [phone], [address] [place] [address]      If you are intoxicated, you may be required to detox at a detox facility before starting treatment. The following are detox facilities that you can self present to. All detox facilities are able to help you complete an assessment/rule 25 prior to discharge if you choose:      [facility]: [address] [place], [place], [address].         [phone]    [facility]: [address] [place], [place], [address]  [phone]    [facility]: [address] [place], [place], [address] [phone]    [facility]: [address] [place], [place], [address] [phone]        It is recommended that you abstain from all mood altering chemicals. Please contact the sober support hotline (679-301-8760) as needed; phones are answered 24 hours a day, 7 days a week. Other support hotlines include:    [facility] Mental Health & Addiction: 1-912-153-6161    [facility] Support Line: 1-179.906.9710        Ways to help cope with sobriety:    -- Take prescribed medicines as scheduled  -- Keep follow-up appointments  -- Talk to others about your concerns  -- Get regular exercise    -- Practice deep breathing skills  -- Eat a healthy diet  -- Use community  resources, including hotline numbers, [facility] crisis and support meetings  -- Stay sober and avoid places/people/things associated with substance use    --Maintain a daily schedule/routine    --Get at least 7-8 hours of sleep per night    --Create a list 10--20 healthy activities that you can do that are enjoyable and do not involve substance use    --Create daily goals (approx. 1-4 goals) per day and work to achieve them throughout the day.         [place] [facility] ([facility])  [facility] connects people seeking recovery to resources that help foster and sustain long-term recovery. Whether you are seeking resources for treatment, transportation, housing, job training, education, health care or other pathways to recovery, [facility] is a great place to start.    Phone: 751.186.2529. www.minnesotaJoin The Wellness Team.[place]in (Great listing of all types of recovery and non-recovery related resources)              Alcoholics Anonymous    Phone: 0-460-ALCOHOL    Website: HTTP://WWW.AA.ORG/         [facility] [place] (633-442-9548 or http://aaStitch.es.org)    [facility] [place] (125-874-6193 or www.aasaul.org)         Narcotics Anonymous    Phone: 125.220.8067    Website: www.[place].Simfinit.                   [employer] [address], [place], [place],    Phone: [phone]    Drop-in Hours: Monday-Friday 9-11:30 am. By appointment at other times.    Provides: [facility] is a drop-in center on the [place] side [employer] that provides a safe space for individuals who are homeless and have a history of chemical use. Sobriety is not a requirement but drugs and alcohol are not allowed on the property.    Services: Non-clients can access drop-in services such as Recovery and Harm Reduction Groups, referrals to case management, community activities, shower facilities, and a pool table. Individuals who are homeless and have chemical health needs may be eligible for enrollment into [facility]'s case management  "program. Clients and  work together to access benefits, treatment, health care, shelter, and external housing resources.         [facility] Chemical Assessment & Referral [facility]    [address] [place], [place]    Phone: [phone]    Hours: Monday-Friday 8 am-5 pm.    Provides: Rule 25 assessment and referral for individuals seeking treatment or counseling for chemical dependency. Must be a resident of [facility]. There is no fee for assessment. There is some funding available for treatment programs.         [facility]    [address] [facility]    Intake Phone: [phone] Main: [phone]    Hours: Monday through Friday 7 am-5 pm    Provides: Daily drop-in Rule 25 assessments and weekly mental health assessments and services. Outpatient chemical dependency treatment (with sober recovery housing), relapse prevention, case management, and employment services. Outpatient and residential treatment for women with children. Specializes in assisting individuals with a history of relapse who face multiple barriers to achieving stable recovery.    Remarks: No charge for most services or services can be billed through insurance. Gender-specific services and treatment for co-occurring substance abuse and mental health concerns offered.        Crisis Lines  Crisis Text Line  Text 764175  You will be connected with a trained live crisis counselor to provide support.    Por espanol, texto  TOMÁS a 563644 o texto a 442-AYUDAME en WhatsApp    [facility] (LGBTQ Youth Crisis Line)  2.155.639.4091  text START to 098-788      Community Resources  Fast Tracker  Linking people to mental health and substance use disorder resources  fasttrackermn.org     [place] Mental [facility] Warm Line  Peer to peer support  Monday thru Saturday, 12 pm to 10 pm  084.310.0313 or 0.923.805.2914  Text \"Support\" to [address]    National [place] on Mental Illness (RUCHI)  443.739.8466 or " 1.888.RUCHI.HELPS      Mental Health Apps  My3  https://myAdeapp.org/    VirtualHopeBox  https://Sidense.org/apps/virtual-hope-box/

## 2022-08-24 NOTE — TELEPHONE ENCOUNTER
"[facility] Phone Encounter    Encounter Activities (Refresh/Reselect every encounter): Phone Encounter  Type of Contact Today: Phone call (patient / identified key support person reached)  Date of phone call: August 24, 2022  Initial phone call: 8:43AM  Follow up call: 11:22A - LVM                   Presenting Issue: Pt is presenting with psychotic symptomology, endorsing SI, concerning statements surrounding no longer being around soon, paranoia disrupting daily living and normative functioning.   [facility] Patient: No  MI Intervention: Expressed Empathy/Understanding, Supported Autonomy, Collaboration, Evocation and Permission to raise concern or advise     Writer called pt cell phone,  Rodney answered. Rodney reports that pt has become emotionally and mentally unstable. Rodney adds that her behavior will waiver, such as initially uncontrollable sobbing, now belligerence, just labile in her emotions. Rodney said they were initially going to wait for writer to call them for an appointment, but they decided to just go to ED after pt was getting worse. Rodney said last night pt verbalized, \"everything is so bad, I'm just gonna kill myself\" and didn't say anything else, no other details. Rodney states her paranoia has become very concerning, as pt is worried  and kids will leave her. She had an episode like this a while back, ten years ago. Interventions included her seeing psychiatry, where she was diagnosed with PTSD (based on historical incident), alongside depression and anxiety. Rodney states that pt is not a violent or aggressive person at baseline. Over last 36 hours she can't concentrate or focus, is very anxious. Rodney can't identify any triggers or new stressors that may have influenced the episode. Her symptoms are disruptive to daily living and work, can't really function very well currently. She has been cycling through her emotions, this morning wakes up \"today is gonna be a bad day.\" Son was getting ready to " "go to work, pt gave son a hug and told him, \"sorry I ruined your life, I love you. This is goodbye.\"     Writer asked if they have anyone else in their care team in the community, including an outpatient therapist. Rodney stated that the pt's care team members would come and go all the time, no consistency. Rodney agreed for writer to call back around 11A to see about the ED evaluation and updates.    Safety Concerns:  Risk status (Self / Other harm or suicidal ideation)  Patient reports the following current fears or concerns for personal safety: pt states she wants to kill herself (no other details), tells her son, \"this is goodbye\" and hugs him today. Worried she is being investigated by the FBI and her family will leave her..  Patient reports the following current or recent suicidal ideation or behaviors: pt reports to  that she wants to kill herself (last night) and is making concerning statements around no longer being around. No identified plan/intent per her ..  Patient denies current or recent homicidal ideation or behaviors.  Patient reports current or recent self injurious behavior or ideation including Pt reports wanting to kill herself, not simply NSSI.  Patient reports other safety concerns including Paranoia that FBI is investigating her and she will lose her family. Can't concentrate on work-related duties, very anxious..    Disposition:   Writer called pt cell phone,  Rodney picked up. Writer spoke with him (PHI completed, see nurse note) and explored safety concerns and options. Rodney stated they ended up going to the [place] ED, pt went willingly. She will be assessed soon via telemedicine. Rodney agreed to have writer call back at 11AM to see if they were able to complete an assessment and what their clinical recommendations were.      UPDATE 11:28A: Rodney () returned call to [facility]. Informed writer that they were discharged with recommendation of follow up with outpatient " services. Writer asked about scheduling an appointment today with writer to complete a safety plan, given she does not have an established therapist to do so. Rodney agreed to appointment 1230P today. Rodney verbalized he can keep pt safe and supported at home. He reinforced that pt hasn't ever historically done anything to hurt herself or others. He agreed to look at bridging with [facility] until they can secure a long term therapist for skill-building and healthy coping strategies to manage symptoms.    STEPHANI Lewis, [facility]  Behavioral Health Clinician  [facility]  [address] [place] [address]  [phone]

## 2022-08-24 NOTE — TELEPHONE ENCOUNTER
S: 4:05p Intake received a call from the  Clinician (Gauri).     Provider (Kristen Kehr, PA-C) requested the pt to go the ER because she is not safe at home. Pt is displaying delusional/parinoia thoughts with suicidal thinking, no plan identified by the pt. The pt is experiencing paranoia in the form that the pt believes that the FBI is following/investigating her and she will lose her family.      The pt's work and home life is significantly impaired. Pt is getting variabled sleep.    Pt has no outpatient MH supports in place currently- no therapy or medication management provider. PCP has been managing the pt's care- PCP cannot manage additional psychotropic needs. The pt cannot get a psychiatric consult appointment in a timely matter due to the outpatient referral process. Pt was advised by the provider to go to the [place] ER for MH interventions and support.     4:13p Intake transfer  Clinical also to ER for expected arrival notification.

## 2022-08-24 NOTE — PROGRESS NOTES
[facility] Primary Care: Integrated Behavioral Health  August 24, 2022      Behavioral Health Clinician Progress Note    Patient Name: Neeta Verde           Service Type:  Individual      Service Location:   Face to Face in Clinic     Session Start Time: 1245P  Session End Time: 245P      Session Length: 120 minutes     Attendees: Spouse / Significant Other and pt     Service Modality:  In-person    Visit Activities (Refresh list every visit): NEW, [facility] Only and [address] Crisis Visit with ED Admission Averted     Diagnostic Assessment Date:   Treatment Plan Review Date:   See Flowsheets for today's PHQ-9 and DAVID-7 results  Previous PHQ-9:   PHQ-9 SCORE 10/25/2017 5/7/2020 1/25/2022   PHQ-9 Total Score - - -   PHQ-9 Total Score 0 0 1     Previous DAVID-7:   DAVID-7 SCORE 10/25/2017 5/7/2020 1/25/2022   Total Score - - -   Total Score 0 1 0       CIRILO LEVEL:  CIRILO Score (Last Two) 6/29/2012   CIRILO Raw Score 41   Activation Score 63.2   CIRILO Level 3       DATA  Extended Session (60+ minutes): PROLONGED SERVICE IN THE OUTPATIENT SETTING REQUIRING DIRECT (FACE-TO-FACE) PATIENT CONTACT BEYOND THE USUAL SERVICE:    - Longer session due to limited access to mental health appointments and necessity to address patient's distress / complexity    - Patient's presenting concerns require more intensive intervention than could be completed within the usual service    - High distress and under complex circumstances.  See Data section for details  Interactive Complexity: Yes, visit entailed Interactive Complexity evidenced by:  Crisis: Yes, visit entailed Crisis Management / Stabilization requiring urgent assessment and history of the crisis state, mental status exam and disposition  [facility] Patient: No    Treatment Objective(s) Addressed in This Session:  Target Behavior(s): psychotic/delusional symptomology    Anxiety: will develop more effective coping skills to manage anxiety symptoms, will develop healthy cognitive  patterns and beliefs and will increase ability to function adaptively  Functional Impairment: will effectively address problems that interfere with adaptive functioning  Alcohol / Substance Use: will make healthier choices in regard to substance use  continue to make healthy choices regarding substance use and engage in activities / supportive services that promote sobriety  Attention Problems: will develop coping skills to effectively manage attention issues  Thought Disturbance: will develop skills to more effectively manage symptoms and will continue to take medications as prescribed    Current Stressors / Issues:    Writer spoke with pt and her  regarding current stressors/concerns. Pt feels she is drinking too heavily and writer co-developed a plan with her to decrease amount. Pt also discussed paranoid/delusional ideations and writer co-developed safety plan to assist in securing a safe environment at home and who she can reach out to if entering a crisis.    Progress on Treatment Objective(s) / Homework:  New Objective established this session - PREPARATION (Decided to change - considering how); Intervened by negotiating a change plan and determining options / strategies for behavior change, identifying triggers, exploring social supports, and working towards setting a date to begin behavior change    Motivational Interviewing    MI Intervention: Co-Developed Goal: strategies to decrease alcohol intake including specific about she can be allotted to drink a day and delayed time to start drinking., Expressed Empathy/Understanding, Supported Autonomy, Collaboration, Evocation, Change talk (evoked) and Reframe     Change Talk Expressed by the Patient: Desire to change Reasons to change Need to change    Provider Response to Change Talk: E - Evoked more info from patient about behavior change, A - Affirmed patient's thoughts, decisions, or attempts at behavior change, R - Reflected patient's change talk  and S - Summarized patient's change talk statements    Care Plan review completed: Yes    Medication Review:  Current Outpatient Medications   Medication     albuterol (PROAIR HFA/PROVENTIL HFA/VENTOLIN HFA) 108 (90 Base) MCG/ACT inhaler     azelastine (ASTELIN) 0.1 % nasal spray     escitalopram (LEXAPRO) 20 MG tablet     fexofenadine (ALLEGRA) 180 MG tablet     fluticasone-salmeterol (AIRDUO RESPICLICK) 232-14 MCG/ACT inhaler     No current facility-administered medications for this visit.     Started Fluticasone-salmeterol 8/19/22    Medication Compliance:  Yes      Changes in Health Issues:   Yes: Sleep disturbance, Associated Psychological Distress     Sleep disturbance past week. Diagnosed allergies, have been acting up with med changes.     Chemical Use Review:   Substance Use: increase in alcohol .  Patient reports frequency of use wine/alcohol.  Stage of Change: Preparatory  Patient assessed present costs and future losses as a result of substance use  Reviewed concerns related to health related substance abuse risk  Provided encouragement towards sobriety  Discussed treatment options and encouraged patient to schedule an appointment with PCP        Behavioral change when drinking alcohol, such as arguing with television. Will drink a bottle a night past 5-6 years.    Pt agrees to taper consumption to four mini bottles a night until next session and see progress of reducing amount slowly. Pt also agreed to delay drinking start from 4p to 6p a night.     Tobacco Use: No current tobacco use.      Assessment: Current Emotional / Mental Status (status of significant symptoms):  Risk status (Self / Other harm or suicidal ideation)  Patient has had a history of suicidal ideation: thoughts, no plan/intent - past couple days  Patient denies current fears or concerns for personal safety.  Patient reports the following current or recent suicidal ideation or behaviors: recent but not current. Wanting to be  "gone..  Patient denies current or recent homicidal ideation or behaviors.  Patient denies current or recent self injurious behavior or ideation.  Patient denies other safety concerns.  A safety and risk management plan has been developed including: Patient consented to co-developed safety plan.  A safety and risk management plan was completed.  Patient agreed to use safety plan should any safety concerns arise.  A copy was given to the patient.  Patient voluntarily presented to ED for evaluation.  Collaborative care team was informed of patient's risk status and plan.    Appearance:   Appropriate   Eye Contact:   intense   Psychomotor Behavior: Agitated  Restless   Attitude:   Cooperative  Friendly Pleasant Guarded  Suspicious  Uncooperative  Her attitude changed throughout the session. She started off cooperative and participatory (wanting a follow up session with writer) and as time went on she became more suspicious, guarded (declined follow up session with writer). She stated she didn't want one because she \"didn't want to be a burden.\"  Orientation:   All  Speech   Rate / Production: Emotional   Volume:  Normal   Mood:    Anxious  Depressed  Dysphoric Panicked Fearful Agitated Ambivalence  Affect:    Labile  Tearful Worrisome   Thought Content:  Delusions thinking family is going to leave her, she is a burden to everyone, thinks she is to blame Paranoia  Perservative  Rumination  Suicidal - paranoia that the FBI is investigating her.  Thought Form:  Blocking  Obsessive  Tangential  Neurosis  Insight:    Poor  and Impaired    Diagnoses:  No diagnosis found.    Collateral Reports Completed:  Communicated with: Rodney,      Rodney reports that pt has become emotionally and mentally unstable. Rodney adds that her behavior will waiver, such as initially uncontrollable sobbing, now belligerence, just labile in her emotions. Rodney said they were initially going to wait for writer to call them for an appointment, " "but they decided to just go to ED after pt was getting worse. Rodney said last night pt verbalized, \"everything is so bad, I'm just gonna kill myself\" and didn't say anything else, no other details. Rodney states her paranoia has become very concerning, as pt is worried  and kids will leave her. She had an episode like this a while back, ten years ago. Interventions included her seeing psychiatry, where she was diagnosed with PTSD (based on historical incident), alongside depression and anxiety. Rodney states that pt is not a violent or aggressive person at baseline. Over last 36 hours she can't concentrate or focus, is very anxious. Rodney can't identify any triggers or new stressors that may have influenced the episode. Her symptoms are disruptive to daily living and work, can't really function very well currently. She has been cycling through her emotions, this morning wakes up \"today is gonna be a bad day.\" Son was getting ready to go to work, pt gave son a hug and told him, \"sorry I ruined your life, I love you. This is goodbye.\" Writer asked if they have anyone else in their care team in the community, including an outpatient therapist. Rodney stated that the pt's care team members would come and go all the time, no consistency.    Plan: (Homework, other):  Patient was given information about behavioral services and encouraged to schedule a follow up appointment with the clinic [facility] in 1 week.  While pt was agreeable to follow up visit with writer next week, towards the end of the session she declined to meet with writer because she \"didn't want to be a burden.\" She could not be reframed in the moment. She was also given information about mental health symptoms and encouraged to schedule with their PCP for an appointment as soon as possible., Cognitive Behavioral Therapy skills to practice when experiencing anxiety and depression, deep Breathing Strategies to practice when experiencing anxiety and depression " "and  .  CD Recommendations: Practice Harm Reduction: decrease alcoholic intake daily and delay drinking start each day..      STEPHANI Lewis, [facility]  Behavioral Health Clinician  [facility]  [address] [place] [address]  [phone]  ______________________________________________________________________    Integrated Primary Care Behavioral Health Treatment Plan    Patient's Name: Neeta Verde  YOB: 1970    Date of Creation: 22  Date Treatment Plan Last Reviewed/Revised: treatment plan created today    DSM5 Diagnoses:   Substance-Related & Addictive Disorders Alcohol Use Disorder   303.90 (F10.20) Severe In a controlled environment   296.34 (F33.3) Major Depressive Disorder, Recurrent Episode, With psychotic features _ and With anxious distress    Psychosocial / Contextual Factors: lives at home with  of 30 years, has supportive network per pt and . Her work has been significantly disrupted the past week due to her delusions and anxiety, sleep disturbance.  PROMIS (reviewed every 90 days):     Referral / Collaboration: [facility], OP T  The following referral(s) was/were discussed but patient declines follow up at this time. [facility].    Anticipated number of session for this episode of care: 1x week until transition to an outpatient therapist can be coordinated.  Anticipation frequency of session: Weekly  Anticipated Duration of each session: 16-37 minutes  Treatment plan will be reviewed in 90 days or when goals have been changed.     Patient and significant other/spouse have reviewed and agreed to the above plan.      Integrated Behavioral Health Services                                      Patient's Name: Neeta Verde  2022    SAFETY PLAN:  Step 1: Warning signs / cues (Thoughts, images, mood, situation, behavior) that a crisis may be developing:    Thoughts: \"Oh, you're gonna leave me anyway\" \"I feel like there's this cloud of suspicion, " "and I'm still trying to make sense of it.\" Pt  feels he's more lucid currently versus how things have been last couple days. Difficult for her to take a compliment.    Images: \"I feel like there's this cloud of suspicion, and I'm still trying to make sense of it.\"    Thinking Processes: ruminations (can't stop thinking about my problems): hopelessness, racing thoughts, intrusive thoughts (bothersome, unwanted thoughts that come out of nowhere):  , highly critical and negative thoughts: I'm gonna get fired, I disappointed everyone, disorganized thinking: delayed processing, shifted/disjointed thoughts and paranoia: worry about FBI investigating    Mood: worsening depression, hopelessness, intense worry, agitation, disinhibited (not caring about things or consequences) and mood swings    Behaviors: isolating/withdrawing , using alcohol, can't stop crying, saying good-bye, not taking care of myself, not taking care of my responsibilities and too anxious to do typically coping skills such as poolside Suitcase on bed, about to leave. She reports being uneasy in her body, anxious, overwhelmed. Intense staring, some thought blocking.    Situations: changes in symptoms: see behaviors.     Warning signs that I or other people might notice when a crisis is developing for me: isolation, loss of appetite, increase in depression, loss of interest in preferred activities, suicidal thoughts, suicide attempt, excessive or uncontrollable crying; increased aggression; self injurious behavior  Step 2: Coping strategies - Things I can do to take my mind off of my problems without contacting another person (relaxation technique, physical activity):    Distress Tolerance Strategies:  relaxation activities: drinking wine, gardening, being by the pool, dinner theatre, be outside in general in sun, being with family    Physical Activities: go for a walk and gardening    Focus on helpful thoughts:  \"This is temporary\", \"I will get " "through this\", \"It always passes\", remind myself of what is important to me:   and self-compassion statements:     Things I am able to do on my own to cope or help me feel better:   Grounding Techniques:    Try to notice where you are, your surroundings including the people, the sounds like the TV or radio.    Concentrate on your breathing. Take a deep cleansing breath from your diaphragm. Count the breaths as you exhale. Make sure you breath slowly.    Hold something that you find comforting, for some it may be a stuffed animal or a blanket. Notice how it feels in your hands. Is it hard or soft?    During a non-crisis time make a list of positive affirmations. Print them out and keep them handy for times of intense anxiety. At those times, read them aloud.  Try the EZMove game:    Name 5 things you can see in the room with you    Name 4 things you can feel ( chair on my back  or  feet on floor )     Name 3 things you can hear right now ( people talking  or  tv )     Name 2 things you can smell right now (or, 2 things you like the smell of)     Name 1 good thing about yourself  Create A Safe Place    Image a safe place -- it can be a real or imaginary place:     What do you see -- especially colors?     What sounds do you hear?     What sensations do you feel?     What smells do you smell?     What people or animals would you want in your safe place?     Imagine a protective bubble, wall or boundary around your safe place.     Imagine a door or gate with a guard at your safe place.     Image a lock and key to your safe place and only you can unlock it.    You can draw or make a collage that represents your safe place.     Choose a souvenir of your safe place -- a color, an object, a song.     Keep your image of your safe place so you can come back to it when you need to.  Things that I am able to do with others to cope or help me feel better: reach out to therapist and other mental health providers, reach out to " family members and family friends, identify what makes life worth living; distracting strategies, reach out to crisis lines  Things I can use or do for distraction:   Sing in the shower; take a long walk; count your blessings; read a poem; think about your pet; take a deep breath; let out a big sigh; Buy yourself flowers; Jump rope; Make a  to do  list; Count to 10; Keep a diary; Journal daily; Plant flowers; Do a good deed; Set flexible deadlines; Call a friend; Listen hard; Believe in yourself; Read a good book; See a live play; Write to a friend; Forgive and forget; Eat by candlelight; Go to a ball game; Take a bubble bath; End a bad habit; Listen to music; Share what you have; Don t drink and drive; Be in the moment; Set realistic goals ; Eat right ; Hug a friend; Say something nice; Whistle a tune; Stretch a lot; Watch your weight; Walk instead of drive; Sleep late on weekends; Read the comics; Focus on your task; Don t take drugs; Make a gratitude list; Laugh a lot; Be kind to others; Cry when you can; Praise others; Play with your sibling; Set priorities; Reward yourself often; Massage tense muscles; Do the hard tasks first; Take a long bike ride; Smell a flower; Take a personal day; Draw a picture; Avoid negative people; Dance by yourself; Memorize a new song; Start a new hobby; Ask for a hug ; Think positive thoughts; Do volunteer work; Go to bed an hour early; Talk less; Don t procrastinate; Avoid the small stuff; Don t eat junk food; Start a support group; Call your grandparents; Meditate each day; Remember your successes; Get medical checkups; Turn off the noise; Clean up the clutter; Find the silver lining; Let others be themselves; Walk in the rain; Donate to [employer] [facility]; Exercise 20 minutes a day; Start school work early; Set daily goals for yourself; Visualize a peaceful scene; Schedule play time each day; Give the benefit of the doubt  ; See problems as opportunities; Budget your time and money; Make a  "duplicate set of keys; Break big jobs into small tasks; Teach someone something new; Emphasize your strengths ; Admit you don t know it all; Hum your favorite tune; Take care of your own needs; Remember feelings are not facts; Remember:  this too shall pass;\" Drink a glass of water before bedtime; Change your route to work/school; Develop alternative plans; Avoid seeing, listening to; Reading bad news  Changes I can make to support my mental health and wellness: attend all scheduled mental health appointments; get enough/good sleep; take medications as prescribed; eat a healthy diet; get enough exercise; identify consistent relaxation strategies; develop a routine  Step 3: People and social settings that provide distraction:   Name:  Rodney      Phone: lives at home   Name: Amada Hodges Julie Booker (friends)  Phone: (lives in [place])    Name: Any family     Phone: various    movie theater and go to the park, [facility] [facility] theater, pickleball, work    Step 4: Remind myself of people and things that are important to me and worth living for: Rodney; sons (Suhail and Carroll); both sides of the family, Mary Carmen, other networks of people  Step 5: When I am in crisis, I can ask these people to help me use my safety plan:    Name: Nolan (brother)   Phone: [phone]   Name: Rodney (spouse)  Phone: [phone]   Name:  Leticia (mother-in-law)  Phone: [phone]  outpatient treatment team; family; friends; crisis lines  Step 6: Making the environment safe:     secure medications: continue to assess for safety needs, remove access to firearms:   and be around others  Step 7: Professionals or agencies I can contact during a crisis: local ED, [facility], crisis lines    Crisis Lines    Your [facility] has a mental health crisis team you can call 24/7: [facility] Mobile Crisis Response Team (CRT) [phone] or [phone]    Crisis Text Line  Text 127171  You will be connected with a trained live crisis " "counselor to provide support.  Por kristynanol, texto  TOMÁS a 252943 o texto a 442-AYUDAME en WhatsApp  National Hope Line  1.800.SUICIDE [7416203]  Crisis Intervention: 773.327.2880 or 051-113-4629 (TTY: 519.517.1087).  Call anytime for help.  Suicide Awareness Voices of Education (SAVE) (www.save.org): 287-904-SAVE (8923)  Text 4 Life: txt \"LIFE\" to 32817 for immediate support and crisis intervention    New national suicide and crisis line is now live -- just dial 988    If you or someone you know is struggling or in crisis, help is available. Call or text 988 or chat [employer]branesorg (copy and paste into your browser).    About 988  988 offers 24/7 access to trained crisis counselors who can help people experiencing mental health-related distress. That could be:    Thoughts of suicide    Mental health or substance use crisis, or    Any other kind of emotion distress    People can call or text 98Youboox or chat [employer] for themselves OR if they are worried about a loved one who may need crisis support.    988 serves as a universal entry point so that no matter where you live in the [place], you can reach a trained crisis counselor who can help.    How is 988 different from 911?  988 was established to improve access to crisis services in a way that meets our country s growing suicide and mental health related crisis care needs. 988 will provide easier access to the Lifeline network and related crisis resources, which are distinct from 911 (where the focus is on dispatching Emergency Medical Services, fire and police as needed).     Call 911 or go to my nearest emergency department.     If I am feeling unsafe or I am in a crisis, I will:   Contact my established care providers   Call the National Suicide Prevention Lifeline: 948.119.8527   Go to the nearest emergency room   Call 911     Community Resources    Fast Tracker  Linking people to mental health and substance use disorder resources  fasttrackermn.org " "  [facility] Warm Line  Peer to peer support  Monday thru Saturday, 12 pm to 10 pm  303.937.2419 or 8.293.511.9773  Text \"Support\" to 14911  National [place] on Mental Illness (RUCHI)  766.710.9446 or 1.888.RUCHI.HELPS    Mental Health Apps    My3  https://ShowUhow.Dash/  VirtualHopeBox  https://AppInstitute/apps/virtual-hope-box/    I helped develop this safety plan and agree to use it when needed.  I have been given a copy of this plan.      Patient signature: _______________________________________________________________    Today s date:  2022    Adapted from Safety Plan Template 2008 Nicolasa Peter and Jose Griffith is reprinted with the express permission of the authors.  No portion of the Safety Plan Template may be reproduced without the express, written permission.  You can contact the authors at [email or web address] or [email or web address].    STEPHANI Lewis, [facility]  Behavioral Health Clinician  [facility]  [address] [place] [address]  [phone]      "

## 2022-08-25 ENCOUNTER — TELEPHONE (OUTPATIENT)
Dept: BEHAVIORAL HEALTH | Facility: CLINIC | Age: 52
End: 2022-08-25

## 2022-08-25 VITALS
HEART RATE: 94 BPM | OXYGEN SATURATION: 97 % | DIASTOLIC BLOOD PRESSURE: 80 MMHG | SYSTOLIC BLOOD PRESSURE: 112 MMHG | RESPIRATION RATE: 18 BRPM | TEMPERATURE: 97.8 F

## 2022-08-25 DIAGNOSIS — F32.3 MAJOR DEPRESSIVE DISORDER, SINGLE EPISODE, SEVERE WITH PSYCHOTIC FEATURES (H): Primary | ICD-10-CM

## 2022-08-25 DIAGNOSIS — Z53.9 ERRONEOUS ENCOUNTER--DISREGARD: ICD-10-CM

## 2022-08-25 PROCEDURE — 250N000013 HC RX MED GY IP 250 OP 250 PS 637: Performed by: FAMILY MEDICINE

## 2022-08-25 PROCEDURE — 99217 PR OBSERVATION CARE DISCHARGE: CPT | Performed by: EMERGENCY MEDICINE

## 2022-08-25 PROCEDURE — 99244 OFF/OP CNSLTJ NEW/EST MOD 40: CPT | Performed by: PSYCHIATRY & NEUROLOGY

## 2022-08-25 PROCEDURE — 250N000013 HC RX MED GY IP 250 OP 250 PS 637: Performed by: PSYCHIATRY & NEUROLOGY

## 2022-08-25 RX ORDER — ESCITALOPRAM OXALATE 10 MG/1
20 TABLET ORAL DAILY
Status: DISCONTINUED | OUTPATIENT
Start: 2022-08-25 | End: 2022-08-25 | Stop reason: HOSPADM

## 2022-08-25 RX ORDER — ARIPIPRAZOLE 5 MG/1
5 TABLET ORAL DAILY
Qty: 14 TABLET | Refills: 0 | Status: SHIPPED | OUTPATIENT
Start: 2022-08-25 | End: 2022-08-28

## 2022-08-25 RX ADMIN — ESCITALOPRAM OXALATE 20 MG: 10 TABLET ORAL at 11:47

## 2022-08-25 RX ADMIN — ARIPIPRAZOLE 5 MG: 5 TABLET ORAL at 08:09

## 2022-08-25 ASSESSMENT — ACTIVITIES OF DAILY LIVING (ADL)
ADLS_ACUITY_SCORE: 35

## 2022-08-25 NOTE — TELEPHONE ENCOUNTER
"Per Kristin \"Patient is an appropriate candidate for TC but is declining our assistance. Her next LOC is 8/31/22 - She reports she will call if she needs assistance in the meantime. If she does call, you may schedule her.     Thank you\"      Jes Gates  08/25/22  257    "

## 2022-08-25 NOTE — ED NOTES
Pt states no SI at this time and rates her depression a 4/10.  Pt given AM med and updated on the plan for the day. Pt stated understanding of the plan.

## 2022-08-25 NOTE — TELEPHONE ENCOUNTER
" Mental Health &Addiction (MH&A)Transition Clinic (TC):     Provides Patient Support While Waiting to Access Programmatic and Outpatient MH&A Care and Provides Select Crisis Assessment Services     NURSING Referral Review  _________________________________________    This RN has reviewed this Medication Management referral to the Transition Clinic and deemed the referral   [x] Appropriate  [] Inappropriate  []Consulting     Based on the following criteria:    Pt has a psychiatric provider (or pending plan) in place for future prescribing: Yes:   Provider(s)Adalid Cortez  MSN  CNP,PMHNP,RN   [facility], [facility], [place] [address]   Date/Time  Wednesday, 8/31/2022  Time: 3:00 pm - 4:00 pm        Timeframe until pt's scheduled psychiatry appointment is less than 6 months: Yes       Pt takes psychiatric medications: Yes   escitalopram (LEXAPRO) tablet 20 mg  ARIPiprazole (ABILIFY) 5 MG tablet     Pt's goals seem to align with this temporary service: Yes      Any additional pertinent information regarding this referral:  Patient was assessed by Dr. Bill (psychiatrist) in ER with notes stating \" Referral to psychiatry and transition clinic in interim timeframe to monitor.  Similarly, may continue with PCP for monitoring.  Reviewed efforts at sobriety to include referrals.  Does not meet criteria for inpatient psychiatric placement.  Does not meet criteria for 72-hour hold.  Does not demonstrate imminent risk to self or others.  Denies suicide and homicide ideation.  Able to state an appropriate crisis and relapse plan.  Future oriented.\"     Initial contact w/ patient/parent: RN spoke to patient who stated that she is \"fine without an appointment. I will call if I need assistance\" RN explained TC service more in detail so that way she could make an appointment if she felt like it was needed prior to 8/31/22 appointment. Patient verbalized acknowledgement.  Patient declined service " "and again stated \"I will call if I need assistance\"   RN gave patient TC phone number. Patient verbalized acknowledgement.     The Transition Clinic phone # is [phone].    Ellen Trotter RN on August 25, 2022 at 12:19 PM                "

## 2022-08-25 NOTE — ED NOTES
ED Observation Discharge Summary  [facility]  Discharge Date: 8/25/2022    Neeta Verde MRN: [id number]   Age: 52 year old YOB: 1970     Brief HPI & Initial ED Course     Chief Complaint   Patient presents with     Suicidal     Per  patient making suicidal comments to family, saying goodbye to her 2 kids this morning     Paranoid     Patient thinks her family is leaving her ,very guarded and refusing to answer some questions     HPI  Neeta Verde is a 52 year old female with PMH notable for depression with psychosis who presented to the ED with a psychiatric concern.     The patient was evaluated in the emergency department by a physician and DEC behavioral health . The DEC  recommended evaluation by a psychiatrist. See separate DEC note from this encounter for details on the assessment. The patient's psychiatric state was such that she would benefit from ongoing monitoring. Observation care was initiated with the plan including serial assessments of psychiatric condition, potential administration of medications if indicated, and further disposition pending the patient's psychiatric course during the monitoring period.     See ED Observation H&P for further details on the patient's presenting history and initial evaluation.     Physical Exam   BP: (!) 177/108  Pulse: 84  Temp: 98.4  F (36.9  C)  Resp: 16  SpO2: 99 %    Physical Exam  General:  Appears stated age.     Cardio: Regular rate, extremities well perfused  Resp: Normal work of breathing, normal respiratory rate  Neuro: alert and fullyoriented. CN II-XII grossly intact. Grossly normal strength and sensation in all extremities.     Integumentary/Skin: no rash visualized, normal color  Psych: normal affect, normal behavior.    Results      Procedures                   Labs Ordered and Resulted from Time of ED Arrival to Time of ED Departure - No data to display         Observation Course   The  patient was found to have a psychiatric condition that would benefit from an observation stay in the emergency department for further psychiatric stabilization and/or coordination of a safe disposition. The plan upon observation admission included serial assessments of psychiatric condition, potential administration of medications if indicated, further disposition pending the patient's psychiatric course during the monitoring period.     Serial assessments of the patient's psychiatric condition were performed. Nursing notes were reviewed. During the observation period, the patient did not require medications for agitation, and did not require restraints/seclusion for patient and/or provider safety.   The patient was seen by psychiatrist Dr. Bill while in the ED at approximately 9:45 AM on August 25.  Please see his separate documentation for details.  He believes the patient is safe to be discharged home.  He recommends restarting Abilify at 5 mg a day.  I have prescribed this medication for the patient.  He also recommends that she continue the Lexapro 20 mg a day: He is currently taking this medication .  She will be referred to the Transitions Clinic as a bridge for outpatient psychiatric follow-up.     Patient was also seen by ED  Della prior to discharge..  Please see her separate documentation for details.    After the period in observation care, the patient's circumstances and mental state were safe for outpatient management. After counseling on the diagnosis, work-up, and treatment plan, the patient was discharged. Close follow-up with a psychiatrist and/or therapist was recommended and community psychiatric resources were provided. Patient is to return to the ED if any urgent or potentially life-threatening concerns.      Discharge Diagnoses:   Final diagnoses:   Psychosis, brief reactive (H)   Depression, unspecified depression type   Anxiety       --This note was created in part by the  use of Dragon voice recognition dictation system. Inadvertent grammatical errors and typographical errors may still exist.  MD Jacqui Wakefield MD  [facility] EMERGENCY DEPARTMENT  8/25/2022      Jacqui Gama MD  08/25/22 1547

## 2022-08-25 NOTE — SAFE
Neeta Verde  August 24, 2022  SAFE Note    Critical Safety Issues: paranoid, vulnerable, impulsive, delusional      Current Suicidal Ideation/Self-Injurious Concerns/Methods: None - N/A      Current or Historical Inappropriate Sexual Behavior: No      Current or Historical Aggression/Homicidal Ideation: None - N/A      Triggers: fearing spouse and children will leave her, feeling the need to always be around them, fearing she will lose her job, reportedly this began after being informed she had jury duty.    Guardianship Status: Neeta is her own guardian. Guardianship paperwork is not required.    This patient is a child/adolescent: No    This patient has additional special visitor precautions: No    Updated care team: Yes: attending physician    For additional details see full LMHP assessment.       ZACH Stewart

## 2022-08-25 NOTE — ED NOTES
Received report on the Pt from Leticia MORROW RN. Pt resting in room and her significant other is at the bedside.

## 2022-08-25 NOTE — CONSULTS
"PSYCHIATRY   ED CONSULT     DATE OF SERVICE   8/25/2022       CHIEF COMPLAINT   \"I was previously on Abilify.\"       CONSULT REQUEST BY   Dr. Jacqui Gama MD re: medication management and disposition.       HISTORY OF PRESENT ILLNESS   This is a 52 year old female with history depression with psychosis.  Patient does not have prior history of psychiatric hospitalization, CD treatment, nor suicide attempt.  Now, presenting with her second ED visit on same days time for recurrent symptoms of depression with psychosis.    Patient seen for mental health consultation at this facility's ED to review medications and indication for disposition.    Care coordination performed in detail.  Includes, detailed review of Care Everywhere and epic to review electronic chart information of available mental health information related to presentation.  Includes, ED presentation to [facility] on 8/24.  Review of ED visit to this facility on same date, 8/24.  Please see associated documentation for additional details.  Additional information gathered by means of direct care coordination with patient's  who was present at time of assessment outpatient during ED visit.    In brief, patient presented to [facility] ED on 8/24 by her  as coordinated by outpatient provider due to mental concerns.  Has been calling in regards to reemergence of symptoms initially noted with onset 10 years ago of depression with psychosis.  Described patient as flat, lacking interest, withdrawn and poor concentration.  Difficulties with word-finding.  Associated paranoia with past events of being, \"investigated,\" of work related incident occurring 10 years ago.  Further symptoms of anxiety and insomnia.  Complicated by alcohol use of drinking up to 1 or 2 bottles of wine daily.  Further complicated by medication management historically coordinated by psychiatry, but transition to PCP due to departure of psychiatrist.  Initial " medications associated with efficacy and tolerability include Lexapro with Abilify.  Later, Abilify discontinued as recent as 2 years ago due to belief of not needing medication versus concerns of side effect of weight gain, despite benefit of adjunct therapy.  DEC assessment performed with recommendation of referral to transition clinic and referral to psychiatry.    Patient required repeat presentation on same date, 8/24/2022, to ED at this facility due to concerns of paranoia and suicidal ideation.  During assessment, patient adamantly denied suicide thoughts.  Patient did indicate concerns about  and son are going to leave her.  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  Associated with investigation.  At same time, patient started on medication management for depression and anxiety with the Lexapro and Abilify with benefit.  Had been doing well on combination therapy of Lexapro and Abilify.  Recommendation to observe and review disposition at follow-up.    Upon patient interview, patient review performed in the ED 11 at this facility.  Patient is cooperative on approach.  Evaluation performed with  at patient consent, verifying information as provided in detail.  Evaluation effort to review events into presentation and clarify information as provided in collateral report.    Patient has at least a 10-year history of depression, anxiety with psychotic features.  Presentation is not associated with prior psychiatric hospitalization, CD treatment or suicide attempt.  Since onset, has been on Lexapro and historically on Abilify until at least 2 years ago.  Initially, managed by psychiatry and more recent by PCP.  Patient reports not taking Abilify as recent as 2 years ago.  Indicates concerns of weight gain, but consents to retrial given benefit.  Discussed efforts of weight management in detail.  Patient's  supports  "beneficial response of combination of Lexapro with Abilify.    On psychiatric review of symptoms, mood is, \"better.\"  Acknowledges depressive symptoms.  Denies eunice or hypomania.  Insomnia with low energy.  Anhedonia.  No change in weight reported.  Poor concentration.  Negative thoughts of self and situation.  Tearful episode.  Patient's  expresses concern of tearful episodes a day prior associated with mood congruent psychosis.  Concerns validated and reviewed anticipated response to medication retrial to Abilify and further encouraged therapy referral.  Denies suicide and homicide ideation.  Denies suicide attempt history.  Patient's  reports gun in safe and no access to patient.  No further psychosis symptoms to include voices.    Acknowledges alcohol use as reported.  Denies alcohol use is daily.  Denies further substances of use.  Denies CD treatment history, detox or withdrawal symptoms or legal issues.  Accepting of CD referrals for further evaluation.    Denies physical issues that are new or worsening.    Treatment plan reviewed in detail.  Includes, medication retrial of Abilify as adjunct therapy to PTA Lexapro.  Reviewed in further detail, risk, benefits and alternatives.  Referral to psychiatry and transition clinic in interim timeframe to monitor.  Similarly, may continue with PCP for monitoring.  Reviewed efforts at sobriety to include referrals.  Does not meet criteria for inpatient psychiatric placement.  Does not meet criteria for 72-hour hold.  Does not demonstrate imminent risk to self or others.  Denies suicide and homicide ideation.  Able to state an appropriate crisis and relapse plan.  Future oriented.    Treatment and disposition plan reviewed with ED  and physician.       CHEMICAL DEPENDENCY HISTORY   History   Drug Use No     Social History    Substance and Sexual Activity      Alcohol use: Yes        Alcohol/week: 14.0 standard drinks    History   Smoking " Status     Former Smoker     Packs/day: 0.00     Years: 2.00     Types: Cigarettes     Start date: 1/1/1987     Quit date: 1/1/1989   Smokeless Tobacco     Never Used     Comment: Social Use only     Reviewed in detail and please see DEC Assessment for full details and history.       PAST PSYCHIATRIC HISTORY   Psychiatrist: PCP  Therapist: None  Case Management: None  Hospitalizations: Denies    Most recent:  N/A  History of Commitment: None  Past Medications:     Lexapro    Abilify  TMS/ECT:  No  Suicide Attempts/Gun Access:     Denies suicide attempt history.    Gun secured without access.  Verified as per direct discussion with .    Reviewed in detail and please see DEC Assessment for full details and history.       PAST MEDICAL HISTORY   Past Medical History:   Diagnosis Date     ASCUS favor benign 8/2015    Neg HPV     Depressive disorder      Moderate persistent asthma without complication      Uncomplicated asthma 1/30/20    Went to ER and followup with Allergy Doc     Past Surgical History:   Procedure Laterality Date     [facility] APPENDECTOMY  1978     Primary Care Provider: Kehr, Kristen M  Medications:     ARIPiprazole  5 mg Oral Daily     escitalopram  20 mg Oral Daily     Medications as needed:     ALLERGIES: Azithromycin and Moxifloxacin hydrochloride    Reviewed in detail and please see ED Intake for full details and history.       MEDICATIONS   Current Outpatient Medications   Medication Sig Dispense Refill     albuterol (PROAIR HFA/PROVENTIL HFA/VENTOLIN HFA) 108 (90 Base) MCG/ACT inhaler Inhale 2 puffs into the lungs every 4 hours as needed for wheezing 18 g 5     ARIPiprazole (ABILIFY) 5 MG tablet Take 1 tablet (5 mg) by mouth daily 14 tablet 0     escitalopram (LEXAPRO) 20 MG tablet Take 1 tablet (20 mg) by mouth daily 90 tablet 3     fexofenadine (ALLEGRA) 180 MG tablet Take 1 tablet (180 mg) by mouth daily       fluticasone-salmeterol (AIRDUO RESPICLICK) 232-14 MCG/ACT inhaler Inhale 1 puff  into the lungs 2 times daily 1 each 4     azelastine (ASTELIN) 0.1 % nasal spray Spray 2 sprays into both nostrils 2 times daily as needed for rhinitis 30 mL 3     Medication adherence issues: MS Med Adherence Y/N: No  Medication side effects: MEDICATION SIDE EFFECTS: no side effects reported  Benefit: Yes / No: Yes       ROS   As per ED physician, Dr. Darrian Nash MD. Dated 2022:    A complete review of systems was performed with pertinent positives and negatives noted in the HPI, and all other systems negative.       FAMILY HISTORY   Family History   Adopted: Yes   Problem Relation Age of Onset     Other - See Comments Son         chron's     Crohn's Disease Son      Allergy (Severe) Son      Unknown/Adopted No family hx of         Unknown family history      Asthma No family hx of       Psychiatric: Denies  Chemical: Denies  Suicide: Denies       SOCIAL HISTORY   Social History     Socioeconomic History     Marital status:      Spouse name: Not on file     Number of children: Not on file     Years of education: Not on file     Highest education level: Not on file   Occupational History     Comment: [employer]   Tobacco Use     Smoking status: Former Smoker     Packs/day: 0.00     Years: 2.00     Pack years: 0.00     Types: Cigarettes     Start date: 1987     Quit date: 1989     Years since quittin.6     Smokeless tobacco: Never Used     Tobacco comment: Social Use only   Vaping Use     Vaping Use: Never used   Substance and Sexual Activity     Alcohol use: Yes     Alcohol/week: 14.0 standard drinks     Drug use: No     Sexual activity: Yes     Partners: Male     Birth control/protection: Post-menopausal   Other Topics Concern     Parent/sibling w/ CABG, MI or angioplasty before 65F 55M? No   Social History Narrative    ENVIRONMENTAL HISTORY: [place] The home is heated with a forced air and gas furnace. They do have central air conditioning. The  patient's bedroom is furnished with carpeting in bedroom and fabric window coverings.  Pets inside the house include 2 dog(s). There is no history of cockroach or mice infestation. There is/are 0 smokers in the house.  The house does not have a damp basement.      Social Determinants of Health     Financial Resource Strain: Not on file   Food Insecurity: Not on file   Transportation Needs: Not on file   Physical Activity: Not on file   Stress: Not on file   Social Connections: Not on file   Intimate Partner Violence: Not on file   Housing Stability: Not on file     Occupation: Yes  Marital Status:   Children: 2 children  Legal: None reported  Living Situation: Living arrangements - the patient lives with their family       MENTAL STATUS EXAM   Appearance:  Cooperative.  Assessment performed with  at patient consent.  Mood:  Mood: Dysphoric  Affect: blunted  was congruent to speech  Suicidal Ideation: PRESENT / ABSENT: absent   Homicidal Ideation: PRESENT / ABSENT: absent   Thought process: Ruby and no ELIUD.  Thought content: denies suicidal and violent ideation.   Fund of Knowledge: Sufficient  Attention/Concentration: Fair  Language ability:  Intact  Memory: Sufficient  Insight:  fair.  Judgement: adequate for safety  Orientation: Yes, x4  Psychomotor Behavior: slowed    Muscle Strength and Tone: MuscleStrength: Normal  Gait and Station: In bed       PHYSICAL EXAM   Vitals: /80   Pulse 94   Temp 97.8  F (36.6  C) (Oral)   Resp 18   LMP 07/29/2015 (Exact Date)   SpO2 97%     Physical exam as per ED physician, Dr. Darrian Nash MD. Dated 8/24/2022:    Constitutional:       General: She is not in acute distress.     Appearance: She is not diaphoretic.   HENT:      Head: Atraumatic.      Mouth/Throat:      Pharynx: No oropharyngeal exudate.   Eyes:      General: No scleral icterus.     Pupils: Pupils are equal, round, and reactive to light.   Cardiovascular:      Heart sounds: Normal  heart sounds.   Pulmonary:      Effort: No respiratory distress.      Breath sounds: Normal breath sounds.   Abdominal:      General: Bowel sounds are normal.      Palpations: Abdomen is soft.      Tenderness: There is no abdominal tenderness.   Musculoskeletal:         General: No tenderness.   Skin:     General: Skin is warm.      Findings: No rash.   Neurological:      General: No focal deficit present.      Sensory: No sensory deficit.      Motor: No weakness.      Coordination: Coordination normal.   Psychiatric:         Mood and Affect: Mood is anxious. Affect is flat.         Speech: Speech is delayed.         Behavior: Behavior is slowed and withdrawn.         Thought Content: Thought content is paranoid.        LABS   personally reviewed.   No visits with results within 2 Month(s) from this visit.   Latest known visit with results is:   Office Visit on 04/13/2022   Component Date Value     Immunoglobulin E 04/13/2022 34      WBC Count 04/13/2022 7.3      RBC Count 04/13/2022 4.30      Hemoglobin 04/13/2022 14.0      Hematocrit 04/13/2022 42.1      MCV 04/13/2022 98      MCH 04/13/2022 32.6      MCHC 04/13/2022 33.3      RDW 04/13/2022 12.2      Platelet Count 04/13/2022 259      % Neutrophils 04/13/2022 55      % Lymphocytes 04/13/2022 32      % Monocytes 04/13/2022 11      % Eosinophils 04/13/2022 2      % Basophils 04/13/2022 0      Absolute Neutrophils 04/13/2022 4.0      Absolute Lymphocytes 04/13/2022 2.3      Absolute Monocytes 04/13/2022 0.8      Absolute Eosinophils 04/13/2022 0.2      Absolute Basophils 04/13/2022 0.0      No results found for: PHENYTOIN, PHENOBARB, VALPROATE, CBMZ       ASSESSMENT   Patient seen for mental health consultation.  Presenting with recurrent signs and symptoms of depressive episode with psychosis.  History of efficacy of adjunct therapy, Abilify, to PTA Lexapro.  Accepting of medication retrial of Abilify to [facility] medication, outpatient mental health referrals.   Patient's  accepting of recommendation to continue stabilization in the community for least restrictive placement.  Does not meet criteria for 72-hour hold.  Does not demonstrate imminent risk to self or others.  Amenable for release after detailed review of risk factors.       DIAGNOSIS   Principal Problem:    Major depressive disorder, recurrent episode, moderate with mood-congruent psychotic features (H)    Active Problem List:  Patient Active Problem List   Diagnosis     Major depressive disorder, recurrent episode, moderate with mood-congruent psychotic features (H)     Psychosis, brief reactive (H)     CARDIOVASCULAR SCREENING; LDL GOAL LESS THAN 160     ASCUS of cervix with negative high risk HPV     Moderate persistent asthma without complication     Allergic rhinitis due to animal dander     Allergic rhinitis due to dust mite     Seasonal allergic rhinitis due to pollen        RECOMMENDATIONS   1. Education given regarding diagnostic and treatment options with risks, benefits and alternatives and adequate verbalization of understanding.  2. Consult as requested.    Legal status: Voluntary    Risk assessment: Does not meet criteria for 72-hour hold.  Does not demonstrate imminent risk to self or others.  Accepting of referrals for efforts of sobriety for alcohol use.  Denies suicide ideation with plan or intent.  No gun access as verified with by discussion with .  Future oriented for family.  Least restrictive placement indicated to continue cares from the community for ongoing mental health stabilization as accepted by .    Disposition: Home with  as accepted with increase of community supports.  3. Medications: 8/24/2022: PTA medications reviewed.    Lexapro: Continue PTA medication for depression and anxiety.  4. Medications:  ED    Abilify: 8/25: Perform medication retrial for adjunct therapy.      Prescription: A 2-week prescription of Abilify written by ED physician.  5.  Structure and Supervision    ED until coordination of disposition to home with increase of supports.  6.  LMHP following in regards to collecting and reviewing collateral information, referrals and disposition planning.    Legal: Voluntary, not holdable.    Referrals: Transitions clinic, outpatient psychiatry.    Care Coordination: ; extended care LMHP; ED physician.    Placement: Home with increase supports    Anticipated Discharge: Today  7. Further treatment programming during ED course by Primary Team.        Risk Assessment: IP MHAC RISK ASSESSMENT: Patient able to contract for safety    This note was created with help of Dragon dictation system. Grammatical / typing errors are not intentional.    Jules Bill MD

## 2022-08-25 NOTE — ED NOTES
"Triage & Transition Services, Extended Care     Therapy Progress Note    Patient: Neeta goes by \"Neeta,\" uses she/her pronouns  Date of Service: August 25, 2022  Site of Service: [facility] ED11  Patient was seen in-person.     Presenting problem:   Neeta is followed related to Complex Care Plan which indicates observation overnight in the ED. Follow up with psych consult and discharge with outpatient services if appropriate. Please see initial DEC/[facility] Crisis Assessment completed by ZACH Stewart on 8.24.2022 for complete assessment information. Notable concerns include: presented to the ED for concerns related to paranoia and suicidal ideation.      Individuals Present: Neeta & LAURY Ortiz    Session start: 10:35am  Session end: 12:30pm  Session duration in minutes: 2 hours  Session number: 1  Anticipated number of sessions or this episode of care: 1  CPT utilized: 62316 - Psychotherapy (with patient) - 30 (16-37*) min and 51657 - Family psychotherapy without patient present - 50 (26+) min      Current Presentation:   Patient has a history of severe, recurrent, MDD with psychotic features, PTSD, anxiety, and paranoia. Pt expressed a desire to discharge home and is open to following up with outpatient providers. Pt shared concerns that her family will leave her because she has ruined their lives. Pt's  who was present during assessment expressed that family is very supportive and have no plans to leave. He reports that the current state of pt's mental health has been impacting her daily function and family wants pt to be safe and is seeking appropriate care to assist pt in returning to baseline. Pt denies suicidal or homicidal ideation. Pt appeared to oscillate between clear thinking and paranoia related to family leaving. At times pt would stare intensely and would be unable to track conversation fully.      Mental Status Exam:   Appearance: adequately groomed  Attitude: cooperative  Eye Contact: " intense  Mood: anxious  Affect: intensity is blunted  Speech: clear, coherent  Psychomotor Behavior: no evidence of tardive dyskinesia, dystonia, or tics  Thought Process:  oscillate between logical and paranoid   Associations: no loose associations  Thought Content: no evidence of suicidal ideation or homicidal ideation  Insight: fair  Judgement: fair  Oriented to: time, person, and place  Attention Span and Concentration: intact  Recent and Remote Memory: fair    Diagnosis:   1. Major depressive disorder, Recurrent episode, With psychotic features F33.3 - Primary                                     2. Posttraumatic stress disorder F43.10 - By History                 3. Generalized anxiety disorder F41.1 - By History    Therapeutic Intervention(s):   Provided active listening, unconditional positive regard, and validation.     Treatment Objective(s) Addressed:   The focus of this session was on rapport building, identifying an appropriate aftercare plan, assessing safety and exploring obstacles to safety in the community       Case Management:   Writer collaborated with Dr. Bill about care plan. Please see psychiatry note from 8.25.22 for full details.     Writer connected with Concepcion Mays at [facility] who saw patients on 8.25.22 and recommended pt present to [place]. Concepcion identified that she plans to continue providing therapeutic support to the family and was hoping the pt could be referred to transition clinic for medication management as they do not have a primary psychiatrist yet or a scheduled appointment and pcp is feeling medication needs is outside their scope.      Plan:   After therapeutic assessment, intervention and aftercare planning by ED care team and [facility] and in consultation with attending provider and psychiatrist Dr. Bill, the patient's circumstances and mental state were appropriate for outpatient management. It is the recommendation of this  clinician that pt discharge with OP MH support. A this time the pt is not presenting as an acute risk to self or others due to the following factors: Denied suicidal ideation, able to contract for safety, willing and wanting to discharge home, willing and wanting to accept referral for medication management and appointment for MI/CD assessment.    Plan for Care reviewed with Assigned Medical Provider? Yes: Dr. Gama and Dr Landy Lopez, [facility]   Licensed Mental Health Professional (LMHP), [facility]  [phone]    Appointments:    Mental Health and Chemical Dependency assessment:  Date: Monday August 29, 2022 Time: 11:00am   Provider: Magdalene Espino  Location: [facility]- Virtual appointment    Patient Instructions  Visit Type:    VIDEO VISIT NEW  Please Note: This is a virtual visit; there is no need to come to the facility.    Please have a list of all current medications available for appointment.    Under State and Federal Law; healthcare facilities must inform each patient of their Patient Rights.  This assures that the healthcare system is fair, and it works to meet patients' needs.  If you d like to read of copy of our Patient Bill of Rights, [email or web address]    Learn more about your rights and protections against surprise medical bills on [email or web address]    Type: Telepsychiatry  Date: Wednesday, 8/31/2022  Time: 3:00 pm - 4:00 pm  Provider: Adalid TYSON  CNP,PMHNP,RN  Location: [facility], [address] [place] [address] [place], [place] [address]  Phone: [phone]    Patient Instructions  All Intake appointments will be conducted via telehealth and must have access to video through smart phone or laptop/pc/tablet. You will be contacted by our office to set up the virtual meeting. If you have questions, please contact our office at [phone].    You have been referred to the [facility]. This  "outpatient service provides short-term, VIRTUAL medication management sessions until you begin scheduled services with long-term care providers. Our staff will contact you to schedule. If you have questions, call Transition Clinic at [phone].    Please continue to utilize safety plan created with Concepcion Mays on 8.24.22  SAFETY PLAN:  Step 1: Warning signs / cues (Thoughts, images, mood, situation, behavior) that a crisis may be developing:  ? Thoughts: \"Oh, you're gonna leave me anyway\" \"I feel like there's this cloud of suspicion, and I'm still trying to make sense of it.\" Pt  feels he's more lucid currently versus how things have been last couple days. Difficult for her to take a compliment.  ? Images: \"I feel like there's this cloud of suspicion, and I'm still trying to make sense of it.\"  ? Thinking Processes: ruminations (can't stop thinking about my problems): hopelessness, racing thoughts, intrusive thoughts (bothersome, unwanted thoughts that come out of nowhere):  , highly critical and negative thoughts: I'm gonna get fired, I disappointed everyone, disorganized thinking: delayed processing, shifted/disjointed thoughts and paranoia: worry about FBI investigating  ? Mood: worsening depression, hopelessness, intense worry, agitation, disinhibited (not caring about things or consequences) and mood swings  ? Behaviors: isolating/withdrawing , using alcohol, can't stop crying, saying good-bye, not taking care of myself, not taking care of my responsibilities and too anxious to do typically coping skills such as poolside Suitcase on bed, about to leave. She reports being uneasy in her body, anxious, overwhelmed. Intense staring, some thought blocking.  ? Situations: changes in symptoms: see behaviors.     Warning signs that I or other people might notice when a crisis is developing for me: isolation, loss of appetite, increase in depression, loss of interest in preferred activities, suicidal " "thoughts, suicide attempt, excessive or uncontrollable crying; increased aggression; self injurious behavior  Step 2: Coping strategies - Things I can do to take my mind off of my problems without contacting another person (relaxation technique, physical activity):  ? Distress Tolerance Strategies:  relaxation activities: drinking wine, gardening, being by the pool, dinner theatre, be outside in general in sun, being with family  ? Physical Activities: go for a walk and gardening  ? Focus on helpful thoughts:  \"This is temporary\", \"I will get through this\", \"It always passes\", remind myself of what is important to me:   and self-compassion statements:     Things I am able to do on my own to cope or help me feel better:   Grounding Techniques:    Try to notice where you are, your surroundings including the people, the sounds like the TV or radio.    Concentrate on your breathing. Take a deep cleansing breath from your diaphragm. Count the breaths as you exhale. Make sure you breath slowly.    Hold something that you find comforting, for some it may be a stuffed animal or a blanket. Notice how it feels in your hands. Is it hard or soft?    During a non-crisis time make a list of positive affirmations. Print them out and keep them handy for times of intense anxiety. At those times, read them aloud.  Try the 3BaysOver game:    Name 5 things you can see in the room with you    Name 4 things you can feel ( chair on my back  or  feet on floor )     Name 3 things you can hear right now ( people talking  or  tv )     Name 2 things you can smell right now (or, 2 things you like the smell of)     Name 1 good thing about yourself  Create A Safe Place    Image a safe place -- it can be a real or imaginary place:     What do you see -- especially colors?     What sounds do you hear?     What sensations do you feel?     What smells do you smell?     What people or animals would you want in your safe place?     Imagine a protective " bubble, wall or boundary around your safe place.     Imagine a door or gate with a guard at your safe place.     Image a lock and key to your safe place and only you can unlock it.    You can draw or make a collage that represents your safe place.     Choose a souvenir of your safe place -- a color, an object, a song.     Keep your image of your safe place so you can come back to it when you need to.  Things that I am able to do with others to cope or help me feel better: reach out to therapist and other mental health providers, reach out to family members and family friends, identify what makes life worth living; distracting strategies, reach out to crisis lines  Things I can use or do for distraction:   Sing in the shower; take a long walk; count your blessings; read a poem; think about your pet; take a deep breath; let out a big sigh; Buy yourself flowers; Jump rope; Make a  to do  list; Count to 10; Keep a diary; Journal daily; Plant flowers; Do a good deed; Set flexible deadlines; Call a friend; Listen hard; Believe in yourself; Read a good book; See a live play; Write to a friend; Forgive and forget; Eat by candlelight; Go to a ball game; Take a bubble bath; End a bad habit; Listen to music; Share what you have; Don t drink and drive; Be in the moment; Set realistic goals ; Eat right ; Hug a friend; Say something nice; Whistle a tune; Stretch a lot; Watch your weight; Walk instead of drive; Sleep late on weekends; Read the comics; Focus on your task; Don t take drugs; Make a gratitude list; Laugh a lot; Be kind to others; Cry when you can; Praise others; Play with your sibling; Set priorities; Reward yourself often; Massage tense muscles; Do the hard tasks first; Take a long bike ride; Smell a flower; Take a personal day; Draw a picture; Avoid negative people; Dance by yourself; Memorize a new song; Start a new hobby; Ask for a hug ; Think positive thoughts; Do volunteer work; Go to bed an hour early; Talk  "less; Don t procrastinate; Avoid the small stuff; Don t eat junk food; Start a support group; Call your grandparents; Meditate each day; Remember your successes; Get medical checkups; Turn off the noise; Clean up the clutter; Find the silver lining; Let others be themselves; Walk in the rain; Donate to kike; Exercise 20 minutes a day; Start school work early; Set daily goals for yourself; Visualize a peaceful scene; Schedule play time each day; Give the benefit of the doubt  ; See problems as opportunities; Budget your time and money; Make a duplicate set of keys; Break big jobs into small tasks; Teach someone something new; Emphasize your strengths ; Admit you don t know it all; Hum your favorite tune; Take care of your own needs; Remember feelings are not facts; Remember:  this too shall pass;\" Drink a glass of water before bedtime; Change your route to work/school; Develop alternative plans; Avoid seeing, listening to; Reading bad news  Changes I can make to support my mental health and wellness: attend all scheduled mental health appointments; get enough/good sleep; take medications as prescribed; eat a healthy diet; get enough exercise; identify consistent relaxation strategies; develop a routine  Step 3: People and social settings that provide distraction:              Name:  Rodney                                                                Phone: lives at home              Name: Amada Hodges Julie Booker (friends)              Phone: (lives in [place])               Name: Any family                                                        Phone: various  ? movie theater and go to the park, [place] theater, pickleball, work             Step 4: Remind myself of people and things that are important to me and worth living for: Rodney; sons (Suhail and Carroll); both sides of the family, Mary Carmen, other networks of people  Step 5: When I am in crisis, I can ask these people to help me use my safety plan:          " "     Name: Nolan (brother)                         Phone: [phone]              Name: Rodney (spouse)             Phone: [phone]              Name:  Leticia (mother-in-law)            Phone: [phone]  outpatient treatment team; family; friends; crisis lines  Step 6: Making the environment safe:   ? secure medications: continue to assess for safety needs, remove access to firearms:   and be around others  Step 7: Professionals or agencies I can contact during a crisis: local ED, [facility], crisis lines      Additional Resource:    If I am feeling unsafe or I am in a crisis, I will:   Contact my established care providers   Call the National Suicide Prevention Lifeline: 988  Go to the nearest emergency room   Call 911     Your [facility] has a mental health crisis team you can call 24/7: [facility] Mobile Crisis Response Team (CRT) [phone] or [phone]    Crisis Lines  Crisis Text Line  Text 426636  You will be connected with a trained live crisis counselor to provide support.    Por espanol, texto  TOMÁS a 452047 o texto a 442-AYUDAME en WhatsApp    [employer] (LGBTQ Youth Crisis Line)  6.483.175.2086  text START to 521-518      Community Resources  Fast Tracker  Linking people to mental health and substance use disorder resources  RezdytraPredictSpring.org     [place] Mental Health Warm Line  Peer to peer support  Monday thru Saturday, 12 pm to 10 pm  461.165.9532 or 8.283.442.4303  Text \"Support\" to 66416    National [place] [employer]ness (RUCHI)  613.274.0800 or 1.888.RUCHI.HELPS      Mental Health Apps  My3  https://my3app.org/    VirtualHopeBox  https://POWWOW.org/apps/virtual-hope-box/      Additional Information  Today you were seen by a licensed mental health professional through Triage and Transition services, [facility] ([facility])  for a crisis assessment in the Emergency Department at [facility].  It is recommended that " you follow up with your established providers (psychiatrist, mental health therapist, and/or primary care doctor - as relevant) as soon as possible. Coordinators from [facility] will be calling you in the next 24-48 hours to ensure that you have the resources you need.  You can also contact [facility] coordinators directly at [phone]. You may have been scheduled for or offered an appointment with a mental health provider. [facility] maintains an extensive network of licensed behavioral health providers to connect patients with the services they need.  We do not charge providers a fee to participate in our referral network.  We match patients with providers based on a patient's specific needs, insurance coverage, and location.  Our first effort will be to refer you to a provider within your care system, and will utilize providers outside your care system as needed.
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
## 2022-08-25 NOTE — TELEPHONE ENCOUNTER
[facility] Phone Encounter    Encounter Activities (Refresh/Reselect every encounter): Phone Encounter  Type of Contact Today: Phone call (patient / identified key support person reached)  Date of phone call: August 25, 2022                   Presenting Issue: Reaching out for f/u of medication/sx stabilization  [facility] Patient: No  MI Intervention: Expressed Empathy/Understanding and Supported Autonomy, Collaboration, Evocation     Summary:    Writer called pt spouse for progress on mental health stabilization, since going to the ED by recommendation of her clinic provider. Spouse (Rodney) stated that the doctor initially had seen the more lucid side of pt presentation and when another  professional met with pt and spouse to coordinate services they noticed the quick decompensation again, sudden fear that she will be abandoned and becoming anxious and tearful. Rodney added that they were just being discharged after scheduling a couple appointments. They will have an ANTHONY assessment Monday and medication management Wednesday. Writer scheduled pt for a virtual visit Wednesday to do brief psychotherapy and bridge until pt can establish with an outpatient long term [facility] therapist. Rodney plans to bring pt to a business trip to [place] after this.    Three appointments scheduled:    Substance Used Disorder (ANTHONY) assessment   Provider: Magdalene Espino   [facility]  Date: 8/29/22  Time:  11A   Mode: VIRTUAL    Medication Management  Provider: Adalid Cortez  Psychiatric/Mental Health Nurse Practitioner  [facility]  Address: [address] [place] [place] [address]  Source: [email or web address]  Date/Time  Wednesday, 8/31/2022  Time: 3:00 pm - 4:00 pm Mode: VIRTUAL    Behavioral Health Clinician (Psychotherapy)  [facility]  [address] [place] [address]  Date: Wednesday, 8/31/22  Time: 11AM-12P  Mode: VIRTUAL    Disposition:   A safety and risk management plan has been developed including: See previous crisis note from yesterday, including safety plan. Pt was given a hard copy of this to follow. Both ED discharges yesterday and today included safety plans which they were given upon discharge. Mitigating safety risk and symptom stabilization includes future appointments for an ANTHONY assessment, medication management follow up, and brief psychotherapy.    STEPHANI Lewis, [facility]  Behavioral Health Clinician  [facility]  [address] [place] [address]  [phone]

## 2022-08-25 NOTE — TELEPHONE ENCOUNTER
Hello,      Med only-     Next Level of Care Patient Will Be Transitioned To: Telepsychiatry   Provider(s)Adalid TYSON  CNP,PMGONZALOP,RN   [facility] [place] [address]   Date/Time  Wednesday, 8/31/2022  Time: 3:00 pm - 4:00 pm       Thank you!             Previous Messages       ----- Message -----   From: Leticia Foreman   Sent: 8/25/2022  11:55 AM CDT   To: Transition Clinic, Transition Clinic Rn Kishore   Subject: TC Referral                                       Transition Clinic Referral   [place] (Limited)         Please Check Type of Referral Requested:       ____THERAPY: The Transition clinic is able to schedule patients without current medical insurance; these patient will be referred to our Social Work Care Coordinator for Medical Insurance              Assistance. We are open for referral for psychotherapy. Patient is referred from:  Extended Care       __X__MEDICATION:  Referrals for Medication are ONLY accepted from the following areas (select): Emergency Department/Urgent Care                                       Suboxone and Opioid Management Referrals are automatically denied. TC Psychiatry cannot see patient without active medical insurance.         Referring Provider Contact Name: Della Lopez; Phone Number: [phone]     Reason for Transition Clinic Referral: Bridge until outpatient appointment     Next Level of Care Patient Will Be Transitioned To: Telepsychiatry   Provider(s)Aadlid TYSON  CNP,PMGONZALOP,RN   [facility], [facility], [place] [address]   Date/Time  Wednesday, 8/31/2022  Time: 3:00 pm - 4:00 pm     What Would Be Helpful from the Transition Clinic: N/A      Needs: NO     Does Patient Have Access to Technology: Yes     Patient E-mail Address: [email or web address]     Current Patient Phone Number: [phone]; (spouse: [phone])     Clinician Gender Preference (if  applicable): NO     Leticia Foreman

## 2022-08-25 NOTE — ED NOTES
Pt  has voiced concern over the Pt going home because she will appear fine on the surface but when you dig deeper you will discover you wont.  He used in example that when the Pt went to give UA sample yesterday she was found lying on the floor of the BR sobbing by staff. Pt at that time stated it was because she dropped the UA cup in the toilet. He states when he asked what that was about today. She stated it was because she was thinking that she had ruined her youngest sons life.

## 2022-08-25 NOTE — ED NOTES
Patient was given discharge paperwork and prescription, as well as belongings (shoes). No questions or concerns. Pt declines vitals as taken earlier.

## 2022-08-25 NOTE — DISCHARGE INSTRUCTIONS
You have been referred to the Transition's clinic.  Please take Abilify as directed.  Continue taking Lexapro.    Appointments:    Mental Health and Chemical Dependency assessment:  Date: Monday August 29, 2022 Time: 11:00am   Provider: Magdalene Espino  Location: [facility]- Virtual appointment    Patient Instructions  Visit Type:    VIDEO VISIT NEW  Please Note: This is a virtual visit; there is no need to come to the facility.    Please have a list of all current medications available for appointment.    Under State and Federal Law; healthcare facilities must inform each patient of their Patient Rights.  This assures that the healthcare system is fair, and it works to meet patients' needs.  If you d like to read of copy of our Patient Bill of Rights, [email or web address]    Learn more about your rights and protections against surprise medical bills on [email or web address]    Type: Telepsychiatry  Date: Wednesday, 8/31/2022  Time: 3:00 pm - 4:00 pm  Provider: Adalid Cortez  MSN  CNP,PMHNP,RN  Location: [facility], [address] [place] [address], [place] [address]  Phone: [phone]    Patient Instructions  All Intake appointments will be conducted via telehealth and must have access to video through smart phone or laptop/pc/tablet. You will be contacted by our office to set up the virtual meeting. If you have questions, please contact our office at [phone].    You have been referred to the [facility] Transition [facility]. This outpatient service provides short-term, VIRTUAL medication management sessions until you begin scheduled services with long-term care providers. Our staff will contact you to schedule. If you have questions, call Transition Clinic at [phone].    Please continue to utilize safety plan created with Concepcion Mays on 8.24.22  SAFETY PLAN:  Step 1: Warning signs / cues (Thoughts, images, mood, situation, behavior) that a crisis  "may be developing:  Thoughts: \"Oh, you're gonna leave me anyway\" \"I feel like there's this cloud of suspicion, and I'm still trying to make sense of it.\" Pt  feels he's more lucid currently versus how things have been last couple days. Difficult for her to take a compliment.  Images: \"I feel like there's this cloud of suspicion, and I'm still trying to make sense of it.\"  Thinking Processes: ruminations (can't stop thinking about my problems): hopelessness, racing thoughts, intrusive thoughts (bothersome, unwanted thoughts that come out of nowhere):  , highly critical and negative thoughts: I'm gonna get fired, I disappointed everyone, disorganized thinking: delayed processing, shifted/disjointed thoughts and paranoia: worry about FBI investigating  Mood: worsening depression, hopelessness, intense worry, agitation, disinhibited (not caring about things or consequences) and mood swings  Behaviors: isolating/withdrawing , using alcohol, can't stop crying, saying good-bye, not taking care of myself, not taking care of my responsibilities and too anxious to do typically coping skills such as poolside Suitcase on bed, about to leave. She reports being uneasy in her body, anxious, overwhelmed. Intense staring, some thought blocking.  Situations: changes in symptoms: see behaviors.     Warning signs that I or other people might notice when a crisis is developing for me: isolation, loss of appetite, increase in depression, loss of interest in preferred activities, suicidal thoughts, suicide attempt, excessive or uncontrollable crying; increased aggression; self injurious behavior  Step 2: Coping strategies - Things I can do to take my mind off of my problems without contacting another person (relaxation technique, physical activity):  Distress Tolerance Strategies:  relaxation activities: drinking wine, gardening, being by the pool, dinner theatre, be outside in general in sun, being with family  Physical " "Activities: go for a walk and gardening  Focus on helpful thoughts:  \"This is temporary\", \"I will get through this\", \"It always passes\", remind myself of what is important to me:   and self-compassion statements:     Things I am able to do on my own to cope or help me feel better:   Grounding Techniques:  Try to notice where you are, your surroundings including the people, the sounds like the TV or radio.  Concentrate on your breathing. Take a deep cleansing breath from your diaphragm. Count the breaths as you exhale. Make sure you breath slowly.  Hold something that you find comforting, for some it may be a stuffed animal or a blanket. Notice how it feels in your hands. Is it hard or soft?  During a non-crisis time make a list of positive affirmations. Print them out and keep them handy for times of intense anxiety. At those times, read them aloud.  Try the Medicina game:  Name 5 things you can see in the room with you  Name 4 things you can feel ( chair on my back  or  feet on floor )   Name 3 things you can hear right now ( people talking  or  tv )   Name 2 things you can smell right now (or, 2 things you like the smell of)   Name 1 good thing about yourself  Create A Safe Place  Image a safe place -- it can be a real or imaginary place:   What do you see -- especially colors?   What sounds do you hear?   What sensations do you feel?   What smells do you smell?   What people or animals would you want in your safe place?   Imagine a protective bubble, wall or boundary around your safe place.   Imagine a door or gate with a guard at your safe place.   Image a lock and key to your safe place and only you can unlock it.  You can draw or make a collage that represents your safe place.   Choose a souvenir of your safe place -- a color, an object, a song.   Keep your image of your safe place so you can come back to it when you need to.  Things that I am able to do with others to cope or help me feel better: reach out to " therapist and other mental health providers, reach out to family members and family friends, identify what makes life worth living; distracting strategies, reach out to crisis lines  Things I can use or do for distraction:   Sing in the shower; take a long walk; count your blessings; read a poem; think about your pet; take a deep breath; let out a big sigh; Buy yourself flowers; Jump rope; Make a  to do  list; Count to 10; Keep a diary; Journal daily; Plant flowers; Do a good deed; Set flexible deadlines; Call a friend; Listen hard; Believe in yourself; Read a good book; See a live play; Write to a friend; Forgive and forget; Eat by candlelight; Go to a ball game; Take a bubble bath; End a bad habit; Listen to music; Share what you have; Don t drink and drive; Be in the moment; Set realistic goals ; Eat right ; Hug a friend; Say something nice; Whistle a tune; Stretch a lot; Watch your weight; Walk instead of drive; Sleep late on weekends; Read the comics; Focus on your task; Don t take drugs; Make a gratitude list; Laugh a lot; Be kind to others; Cry when you can; Praise others; Play with your sibling; Set priorities; Reward yourself often; Massage tense muscles; Do the hard tasks first; Take a long bike ride; Smell a flower; Take a personal day; Draw a picture; Avoid negative people; Dance by yourself; Memorize a new song; Start a new hobby; Ask for a hug ; Think positive thoughts; Do volunteer work; Go to bed an hour early; Talk less; Don t procrastinate; Avoid the small stuff; Don t eat junk food; Start a support group; Call your grandparents; Meditate each day; Remember your successes; Get medical checkups; Turn off the noise; Clean up the clutter; Find the silver lining; Let others be themselves; Walk in the rain; Donate to kike; Exercise 20 minutes a day; Start school work early; Set daily goals for yourself; Visualize a peaceful scene; Schedule play time each day; Give the benefit of the doubt  ; See  "problems as opportunities; Budget your time and money; Make a duplicate set of keys; Break big jobs into small tasks; Teach someone something new; Emphasize your strengths ; Admit you don t know it all; Hum your favorite tune; Take care of your own needs; Remember feelings are not facts; Remember:  this too shall pass;\" Drink a glass of water before bedtime; Change your route to work/school; Develop alternative plans; Avoid seeing, listening to; Reading bad news  Changes I can make to support my mental health and wellness: attend all scheduled mental health appointments; get enough/good sleep; take medications as prescribed; eat a healthy diet; get enough exercise; identify consistent relaxation strategies; develop a routine  Step 3: People and social settings that provide distraction:              Name:  Rodney                                                                Phone: lives at home              Name: Amada Hodges Julie Booker (friends)              Phone: (lives in [place])               Name: Any family                                                        Phone: various  movie theater and go to the park, [employer] theater, pickleball, work             Step 4: Remind myself of people and things that are important to me and worth living for: Rodney; sons (Suhail and Carroll); both sides of the family, Mary Carmen, other networks of people  Step 5: When I am in crisis, I can ask these people to help me use my safety plan:               Name: Nolan (brother)                         Phone: [phone]              Name: Rodney (spouse)             Phone: [phone]              Name:  Leticia (mother-in-law)            Phone: [phone]  outpatient treatment team; family; friends; crisis lines  Step 6: Making the environment safe:   secure medications: continue to assess for safety needs, remove access to firearms:   and be around others  Step 7: Professionals or agencies I can contact during a crisis: local ED, " "[facility], crisis lines      Additional Resource:    If I am feeling unsafe or I am in a crisis, I will:   Contact my established care providers   Call the National Suicide Prevention Lifeline: 988  Go to the nearest emergency room   Call 911     Your [facility] has a mental health crisis team you can call 24/7: [facility] Mobile Crisis Response Team (CRT) [phone] or [phone]    Crisis Lines  Crisis Text Line  Text 503934  You will be connected with a trained live crisis counselor to provide support.    Por espanol, texto  TOMÁS a 474299 o texto a 442-AYUDAME en WhatsApp    [employer] (LGBTQ Youth Crisis Line)  9.241.755.6925  text START to 992-448      Community Resources  Fast Tracker  Linking people to mental health and substance use disorder resources  Tabtor.Like.fm     [place] Mental Health Warm Line  Peer to peer support  Monday thru Saturday, 12 pm to 10 pm  558.697.5324 or 1.078.643.9406  Text \"Support\" to 74129    National [place] on Mental Illness (RUCHI)  956.466.7980 or 1.888.RUCHI.HELPS      Mental Health Apps  My3  https://myArtklikkpp.org/    VirtualHopeBox  https://DNA Games.org/apps/virtual-hope-box/      Additional Information  Today you were seen by a licensed mental health professional through Triage and Transition services, [facility] (P)  for a crisis assessment in the Emergency Department at [facility].  It is recommended that you follow up with your established providers (psychiatrist, mental health therapist, and/or primary care doctor - as relevant) as soon as possible. Coordinators from [facility] will be calling you in the next 24-48 hours to ensure that you have the resources you need.  You can also contact [facility] coordinators directly at [phone]. You may have been scheduled for or offered an appointment with a mental health provider. [facility] maintains an extensive network of licensed behavioral health providers to " connect patients with the services they need.  We do not charge providers a fee to participate in our referral network.  We match patients with providers based on a patient's specific needs, insurance coverage, and location.  Our first effort will be to refer you to a provider within your care system, and will utilize providers outside your care system as needed.

## 2022-08-25 NOTE — TELEPHONE ENCOUNTER
Writer has fwd medication management referral only to TC RN pool as next level of care set and replied to referral source. Will wait to hear if referral is appropriate or inappropriate.    Jes Gates  08/25/22  1212    ----- Message from Leticia Foreman sent at 8/25/2022 11:52 AM CDT -----  Regarding: TC Referral  Transition Clinic Referral   [place] (Limited)        Please Check Type of Referral Requested:       ____THERAPY: The Transition clinic is able to schedule patients without current medical insurance; these patient will be referred to our Social Work Care Coordinator for Medical Insurance              Assistance. We are open for referral for psychotherapy. Patient is referred from:  Extended Care      __X__MEDICATION:  Referrals for Medication are ONLY accepted from the following areas (select): Emergency Department/Urgent Care                                       Suboxone and Opioid Management Referrals are automatically denied. TC Psychiatry cannot see patient without active medical insurance.         Referring Provider Contact Name: Della Lopez; Phone Number: [phone]    Reason for Transition Clinic Referral: Bridge until outpatient appointment    Next Level of Care Patient Will Be Transitioned To: Telepsychiatry  Provider(s)Adalid Cortez  MSN  CNP,PMHNP,RN  Location [facility], [address] [place] [address], [place] [address]  Date/Time  Wednesday, 8/31/2022  Time: 3:00 pm - 4:00 pm    What Would Be Helpful from the Transition Clinic: N/A     Needs: NO    Does Patient Have Access to Technology: Yes    Patient E-mail Address: [email or web address]    Current Patient Phone Number: [phone]; (spouse: [phone])    Clinician Gender Preference (if applicable): NO    Leticia Foreman

## 2022-08-26 ENCOUNTER — HOSPITAL ENCOUNTER (EMERGENCY)
Facility: CLINIC | Age: 52
Discharge: PSYCHIATRIC OR SUBSTANCE USE FACILITY | End: 2022-08-26
Attending: EMERGENCY MEDICINE | Admitting: EMERGENCY MEDICINE
Payer: COMMERCIAL

## 2022-08-26 ENCOUNTER — TELEPHONE (OUTPATIENT)
Dept: BEHAVIORAL HEALTH | Facility: CLINIC | Age: 52
End: 2022-08-26

## 2022-08-26 ENCOUNTER — HOSPITAL ENCOUNTER (INPATIENT)
Facility: HOSPITAL | Age: 52
LOS: 3 days | Discharge: HOME | End: 2022-08-29
Attending: STUDENT IN AN ORGANIZED HEALTH CARE EDUCATION/TRAINING PROGRAM | Admitting: STUDENT IN AN ORGANIZED HEALTH CARE EDUCATION/TRAINING PROGRAM
Payer: COMMERCIAL

## 2022-08-26 VITALS
HEART RATE: 90 BPM | OXYGEN SATURATION: 99 % | TEMPERATURE: 97.8 F | RESPIRATION RATE: 18 BRPM | DIASTOLIC BLOOD PRESSURE: 108 MMHG | BODY MASS INDEX: 24.75 KG/M2 | SYSTOLIC BLOOD PRESSURE: 181 MMHG | WEIGHT: 160.4 LBS

## 2022-08-26 DIAGNOSIS — F33.3 MAJOR DEPRESSIVE DISORDER, RECURRENT EPISODE, MODERATE WITH MOOD-CONGRUENT PSYCHOTIC FEATURES (H): Primary | Chronic | ICD-10-CM

## 2022-08-26 DIAGNOSIS — R45.851 SUICIDAL IDEATION: ICD-10-CM

## 2022-08-26 DIAGNOSIS — F10.930 ALCOHOL WITHDRAWAL SYNDROME WITHOUT COMPLICATION (H): ICD-10-CM

## 2022-08-26 LAB
ALBUMIN SERPL-MCNC: 4.4 G/DL (ref 3.4–5)
ALP SERPL-CCNC: 67 U/L (ref 40–150)
ALT SERPL W P-5'-P-CCNC: 36 U/L (ref 0–50)
ANION GAP SERPL CALCULATED.3IONS-SCNC: 6 MMOL/L (ref 3–14)
APAP SERPL-MCNC: <2 MG/L (ref 10–30)
AST SERPL W P-5'-P-CCNC: 29 U/L (ref 0–45)
BASOPHILS # BLD AUTO: 0 10E3/UL (ref 0–0.2)
BASOPHILS NFR BLD AUTO: 0 %
BILIRUB SERPL-MCNC: 0.8 MG/DL (ref 0.2–1.3)
BUN SERPL-MCNC: 19 MG/DL (ref 7–30)
CALCIUM SERPL-MCNC: 9.4 MG/DL (ref 8.5–10.1)
CHLORIDE BLD-SCNC: 107 MMOL/L (ref 94–109)
CO2 SERPL-SCNC: 27 MMOL/L (ref 20–32)
CREAT SERPL-MCNC: 0.72 MG/DL (ref 0.52–1.04)
EOSINOPHIL # BLD AUTO: 0 10E3/UL (ref 0–0.7)
EOSINOPHIL NFR BLD AUTO: 1 %
ERYTHROCYTE [DISTWIDTH] IN BLOOD BY AUTOMATED COUNT: 11.9 % (ref 10–15)
ETHANOL SERPL-MCNC: <0.01 G/DL
GFR SERPL CREATININE-BSD FRML MDRD: >90 ML/MIN/1.73M2
GLUCOSE BLD-MCNC: 97 MG/DL (ref 70–99)
HCT VFR BLD AUTO: 47.3 % (ref 35–47)
HGB BLD-MCNC: 16.4 G/DL (ref 11.7–15.7)
IMM GRANULOCYTES # BLD: 0 10E3/UL
IMM GRANULOCYTES NFR BLD: 0 %
LYMPHOCYTES # BLD AUTO: 1.6 10E3/UL (ref 0.8–5.3)
LYMPHOCYTES NFR BLD AUTO: 20 %
MCH RBC QN AUTO: 33.5 PG (ref 26.5–33)
MCHC RBC AUTO-ENTMCNC: 34.7 G/DL (ref 31.5–36.5)
MCV RBC AUTO: 97 FL (ref 78–100)
MONOCYTES # BLD AUTO: 0.9 10E3/UL (ref 0–1.3)
MONOCYTES NFR BLD AUTO: 11 %
NEUTROPHILS # BLD AUTO: 5.1 10E3/UL (ref 1.6–8.3)
NEUTROPHILS NFR BLD AUTO: 68 %
NRBC # BLD AUTO: 0 10E3/UL
NRBC BLD AUTO-RTO: 0 /100
PLATELET # BLD AUTO: 276 10E3/UL (ref 150–450)
POTASSIUM BLD-SCNC: 3.5 MMOL/L (ref 3.4–5.3)
PROT SERPL-MCNC: 8 G/DL (ref 6.8–8.8)
RBC # BLD AUTO: 4.89 10E6/UL (ref 3.8–5.2)
SALICYLATES SERPL-MCNC: <2 MG/DL
SARS-COV-2 RNA RESP QL NAA+PROBE: NEGATIVE
SODIUM SERPL-SCNC: 140 MMOL/L (ref 133–144)
WBC # BLD AUTO: 7.6 10E3/UL (ref 4–11)

## 2022-08-26 PROCEDURE — 36415 COLL VENOUS BLD VENIPUNCTURE: CPT | Performed by: EMERGENCY MEDICINE

## 2022-08-26 PROCEDURE — 85025 COMPLETE CBC W/AUTO DIFF WBC: CPT | Performed by: EMERGENCY MEDICINE

## 2022-08-26 PROCEDURE — 82077 ASSAY SPEC XCP UR&BREATH IA: CPT | Performed by: EMERGENCY MEDICINE

## 2022-08-26 PROCEDURE — 124N000001 HC R&B MH

## 2022-08-26 PROCEDURE — 87635 SARS-COV-2 COVID-19 AMP PRB: CPT | Performed by: EMERGENCY MEDICINE

## 2022-08-26 PROCEDURE — 99285 EMERGENCY DEPT VISIT HI MDM: CPT | Mod: 25 | Performed by: EMERGENCY MEDICINE

## 2022-08-26 PROCEDURE — 94640 AIRWAY INHALATION TREATMENT: CPT | Performed by: EMERGENCY MEDICINE

## 2022-08-26 PROCEDURE — 250N000013 HC RX MED GY IP 250 OP 250 PS 637: Performed by: EMERGENCY MEDICINE

## 2022-08-26 PROCEDURE — 80179 DRUG ASSAY SALICYLATE: CPT | Performed by: EMERGENCY MEDICINE

## 2022-08-26 PROCEDURE — C9803 HOPD COVID-19 SPEC COLLECT: HCPCS | Performed by: EMERGENCY MEDICINE

## 2022-08-26 PROCEDURE — 99285 EMERGENCY DEPT VISIT HI MDM: CPT | Performed by: EMERGENCY MEDICINE

## 2022-08-26 PROCEDURE — 80143 DRUG ASSAY ACETAMINOPHEN: CPT | Performed by: EMERGENCY MEDICINE

## 2022-08-26 PROCEDURE — 90791 PSYCH DIAGNOSTIC EVALUATION: CPT

## 2022-08-26 PROCEDURE — 80053 COMPREHEN METABOLIC PANEL: CPT | Performed by: EMERGENCY MEDICINE

## 2022-08-26 RX ORDER — ARIPIPRAZOLE 5 MG/1
5 TABLET ORAL DAILY
Status: DISCONTINUED | OUTPATIENT
Start: 2022-08-26 | End: 2022-08-26 | Stop reason: HOSPADM

## 2022-08-26 RX ORDER — OLANZAPINE 10 MG/2ML
10 INJECTION, POWDER, FOR SOLUTION INTRAMUSCULAR 3 TIMES DAILY PRN
Status: DISCONTINUED | OUTPATIENT
Start: 2022-08-26 | End: 2022-08-29 | Stop reason: HOSPADM

## 2022-08-26 RX ORDER — HYDROXYZINE HYDROCHLORIDE 25 MG/1
50 TABLET, FILM COATED ORAL EVERY 4 HOURS PRN
Status: DISCONTINUED | OUTPATIENT
Start: 2022-08-26 | End: 2022-08-27

## 2022-08-26 RX ORDER — LORAZEPAM 1 MG/1
1 TABLET ORAL ONCE
Status: COMPLETED | OUTPATIENT
Start: 2022-08-26 | End: 2022-08-26

## 2022-08-26 RX ORDER — OLANZAPINE 10 MG/1
10 TABLET ORAL 3 TIMES DAILY PRN
Status: DISCONTINUED | OUTPATIENT
Start: 2022-08-26 | End: 2022-08-29 | Stop reason: HOSPADM

## 2022-08-26 RX ORDER — ACETAMINOPHEN 325 MG/1
650 TABLET ORAL EVERY 4 HOURS PRN
Status: DISCONTINUED | OUTPATIENT
Start: 2022-08-26 | End: 2022-08-29 | Stop reason: HOSPADM

## 2022-08-26 RX ORDER — AZELASTINE 1 MG/ML
2 SPRAY, METERED NASAL 2 TIMES DAILY PRN
Status: DISCONTINUED | OUTPATIENT
Start: 2022-08-26 | End: 2022-08-26 | Stop reason: HOSPADM

## 2022-08-26 RX ORDER — ALBUTEROL SULFATE 90 UG/1
2 AEROSOL, METERED RESPIRATORY (INHALATION) EVERY 4 HOURS PRN
Status: DISCONTINUED | OUTPATIENT
Start: 2022-08-26 | End: 2022-08-26 | Stop reason: HOSPADM

## 2022-08-26 RX ORDER — FEXOFENADINE HCL 180 MG/1
180 TABLET ORAL DAILY
Status: DISCONTINUED | OUTPATIENT
Start: 2022-08-26 | End: 2022-08-26 | Stop reason: HOSPADM

## 2022-08-26 RX ORDER — ESCITALOPRAM OXALATE 10 MG/1
20 TABLET ORAL DAILY
Status: DISCONTINUED | OUTPATIENT
Start: 2022-08-26 | End: 2022-08-26 | Stop reason: HOSPADM

## 2022-08-26 RX ORDER — LANOLIN ALCOHOL/MO/W.PET/CERES
6 CREAM (GRAM) TOPICAL
Status: DISCONTINUED | OUTPATIENT
Start: 2022-08-26 | End: 2022-08-27

## 2022-08-26 RX ORDER — MAGNESIUM HYDROXIDE/ALUMINUM HYDROXICE/SIMETHICONE 120; 1200; 1200 MG/30ML; MG/30ML; MG/30ML
30 SUSPENSION ORAL EVERY 4 HOURS PRN
Status: DISCONTINUED | OUTPATIENT
Start: 2022-08-26 | End: 2022-08-29 | Stop reason: HOSPADM

## 2022-08-26 RX ORDER — AMOXICILLIN 250 MG
1 CAPSULE ORAL 2 TIMES DAILY PRN
Status: DISCONTINUED | OUTPATIENT
Start: 2022-08-26 | End: 2022-08-29 | Stop reason: HOSPADM

## 2022-08-26 RX ADMIN — LORAZEPAM 1 MG: 1 TABLET ORAL at 20:19

## 2022-08-26 RX ADMIN — ESCITALOPRAM OXALATE 20 MG: 10 TABLET ORAL at 20:13

## 2022-08-26 RX ADMIN — FEXOFENADINE HYDROCHLORIDE 180 MG: 180 TABLET ORAL at 20:12

## 2022-08-26 RX ADMIN — FLUTICASONE FUROATE AND VILANTEROL TRIFENATATE 1 PUFF: 200; 25 POWDER RESPIRATORY (INHALATION) at 20:15

## 2022-08-26 RX ADMIN — ARIPIPRAZOLE 5 MG: 5 TABLET ORAL at 20:12

## 2022-08-26 ASSESSMENT — ACTIVITIES OF DAILY LIVING (ADL)
ADLS_ACUITY_SCORE: 35
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
## 2022-08-26 NOTE — TELEPHONE ENCOUNTER
S Hector with DEC called to give clinical on 52/F in [facility] ER    B Brought to ER on 24 with recent discharge with safety plan in place. BIB  again due to worsening depression, anxiety, SI, paranoia. SI with plan of shooting herself, does have access to guns at home but they are locked up. MH DX PTSD, MDD, DAVID with psychosis. Pt denies AH and VH, reports worsening of delusional paranoia of people are watching her, investigating her, out to get her,  and children are going to leave her. No prev SA, no prev [facility] stays. Pt  is concerned as pt has been packing things and leaving the house, high risk of running away from home. No HI or aggression. No major medical concerns, pt is medically cleared. Ambulatory, eating, drinking.     A Vol; holdable. Prefers [facility] placement.     R In [facility] ER awaiting placement.   Patient cleared and ready for behavioral bed placement: Yes     6:20pm: Intake spoke with ER who reports pt's  is willing to look anywhere for placement  6:45pm: On call [facility] provider [facility] accepts pt for 5N/S/Ifeanyi  7pm: Intake informed unit charge of pt placement and report time. Charge says ER can call for report whenever ready. Intake informed ER of pt placement and report time.
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
## 2022-08-26 NOTE — ED TRIAGE NOTES
Pt is having suicidal thought and feels like she has let everyone down and is violating her safety plan with discharge from [place] yesterday

## 2022-08-26 NOTE — ED NOTES
Called Hien at [facility] to see where the patient falls on the list for placement. Let her know that they are flexible with placement, just want her to go where she can get the help she needs. Will call back when they have updated information.

## 2022-08-26 NOTE — SAFE
Neeta Verde  August 26, 2022  SAFE Note    Critical Safety Issues: Pt is a 52 year old White female with a history of depression with psychosis, anxiety, PTSD, alcohol abuse and suicidal ideations.  Pt currently endorsed suicidal ideations without intent and plan.  However, Pt shared she had a plan to use a gun to shoot herself as she looked for a gun at home earlier today.  Pt denied having HI, SIB and history of suicide attempt.  Pt expressed delusion as she alleged her  and two sons will leave her.  Pt expressed paranoia as she felt various group of people were watching her, following her, investigating her and ought to kill her.  Pt denied having auditory and visual hallucinations.      Current Suicidal Ideation/Self-Injurious Concerns/Methods: Other Pt looked for a gun to shoot herself earlier today at home.      Current or Historical Inappropriate Sexual Behavior: Unknown      Current or Historical Aggression/Homicidal Ideation: None - N/A      Triggers: unresolved trauma issues and feeling guilty about letting people down.    Guardianship Status: Pt is her  is her own guardian.. Guardianship paperwork is not required.    This patient is a child/adolescent: No    This patient has additional special visitor precautions: No    Updated care team: Yes: Cong Faith MD and Central intake    For additional details see full [facility] assessment.       GABI EngSW

## 2022-08-27 LAB
AMPHETAMINES UR QL: NOT DETECTED
BARBITURATES UR QL SCN: NOT DETECTED
BENZODIAZ UR QL SCN: NOT DETECTED
BUPRENORPHINE UR QL: NOT DETECTED
CANNABINOIDS UR QL: NOT DETECTED
COCAINE UR QL SCN: NOT DETECTED
D-METHAMPHET UR QL: NOT DETECTED
MAGNESIUM SERPL-MCNC: 2.2 MG/DL (ref 1.6–2.3)
METHADONE UR QL SCN: NOT DETECTED
OPIATES UR QL SCN: NOT DETECTED
OXYCODONE UR QL SCN: NOT DETECTED
PCP UR QL SCN: NOT DETECTED
PHOSPHATE SERPL-MCNC: 3.6 MG/DL (ref 2.5–4.5)
PROPOXYPH UR QL: NOT DETECTED
TRICYCLICS UR QL SCN: NOT DETECTED

## 2022-08-27 PROCEDURE — 84100 ASSAY OF PHOSPHORUS: CPT | Performed by: NURSE PRACTITIONER

## 2022-08-27 PROCEDURE — 83735 ASSAY OF MAGNESIUM: CPT | Performed by: NURSE PRACTITIONER

## 2022-08-27 PROCEDURE — 250N000013 HC RX MED GY IP 250 OP 250 PS 637: Performed by: NURSE PRACTITIONER

## 2022-08-27 PROCEDURE — 124N000001 HC R&B MH

## 2022-08-27 PROCEDURE — HZ2ZZZZ DETOXIFICATION SERVICES FOR SUBSTANCE ABUSE TREATMENT: ICD-10-PCS | Performed by: NURSE PRACTITIONER

## 2022-08-27 PROCEDURE — 80306 DRUG TEST PRSMV INSTRMNT: CPT | Performed by: NURSE PRACTITIONER

## 2022-08-27 PROCEDURE — 36415 COLL VENOUS BLD VENIPUNCTURE: CPT | Performed by: NURSE PRACTITIONER

## 2022-08-27 PROCEDURE — 99223 1ST HOSP IP/OBS HIGH 75: CPT | Performed by: NURSE PRACTITIONER

## 2022-08-27 RX ORDER — ALBUTEROL SULFATE 90 UG/1
2 AEROSOL, METERED RESPIRATORY (INHALATION) EVERY 4 HOURS PRN
Status: DISCONTINUED | OUTPATIENT
Start: 2022-08-27 | End: 2022-08-29 | Stop reason: HOSPADM

## 2022-08-27 RX ORDER — ESCITALOPRAM OXALATE 10 MG/1
20 TABLET ORAL DAILY
Status: DISCONTINUED | OUTPATIENT
Start: 2022-08-27 | End: 2022-08-29 | Stop reason: HOSPADM

## 2022-08-27 RX ORDER — DIAZEPAM 5 MG
10 TABLET ORAL EVERY 30 MIN PRN
Status: DISCONTINUED | OUTPATIENT
Start: 2022-08-27 | End: 2022-08-29 | Stop reason: HOSPADM

## 2022-08-27 RX ORDER — DIAZEPAM 10 MG/2ML
5-10 INJECTION, SOLUTION INTRAMUSCULAR; INTRAVENOUS EVERY 30 MIN PRN
Status: DISCONTINUED | OUTPATIENT
Start: 2022-08-27 | End: 2022-08-29 | Stop reason: HOSPADM

## 2022-08-27 RX ORDER — FOLIC ACID 1 MG/1
1 TABLET ORAL DAILY
Status: DISCONTINUED | OUTPATIENT
Start: 2022-08-27 | End: 2022-08-29 | Stop reason: HOSPADM

## 2022-08-27 RX ORDER — MULTIPLE VITAMINS W/ MINERALS TAB 9MG-400MCG
1 TAB ORAL DAILY
Status: DISCONTINUED | OUTPATIENT
Start: 2022-08-27 | End: 2022-08-29 | Stop reason: HOSPADM

## 2022-08-27 RX ORDER — FLUMAZENIL 0.1 MG/ML
0.2 INJECTION, SOLUTION INTRAVENOUS
Status: DISCONTINUED | OUTPATIENT
Start: 2022-08-27 | End: 2022-08-29 | Stop reason: HOSPADM

## 2022-08-27 RX ORDER — CLONIDINE HYDROCHLORIDE 0.1 MG/1
0.1 TABLET ORAL 2 TIMES DAILY PRN
Status: DISCONTINUED | OUTPATIENT
Start: 2022-08-27 | End: 2022-08-29 | Stop reason: HOSPADM

## 2022-08-27 RX ORDER — TRAZODONE HYDROCHLORIDE 50 MG/1
50 TABLET, FILM COATED ORAL
Status: DISCONTINUED | OUTPATIENT
Start: 2022-08-27 | End: 2022-08-29 | Stop reason: HOSPADM

## 2022-08-27 RX ORDER — QUETIAPINE FUMARATE 25 MG/1
25 TABLET, FILM COATED ORAL 3 TIMES DAILY
Status: DISCONTINUED | OUTPATIENT
Start: 2022-08-27 | End: 2022-08-29 | Stop reason: HOSPADM

## 2022-08-27 RX ORDER — HYDROXYZINE HYDROCHLORIDE 10 MG/1
10 TABLET, FILM COATED ORAL EVERY 4 HOURS PRN
Status: DISCONTINUED | OUTPATIENT
Start: 2022-08-27 | End: 2022-08-29 | Stop reason: HOSPADM

## 2022-08-27 RX ADMIN — CLONIDINE HYDROCHLORIDE 0.1 MG: 0.1 TABLET ORAL at 08:10

## 2022-08-27 RX ADMIN — QUETIAPINE FUMARATE 25 MG: 25 TABLET ORAL at 20:30

## 2022-08-27 RX ADMIN — TRAZODONE HYDROCHLORIDE 50 MG: 50 TABLET ORAL at 20:31

## 2022-08-27 RX ADMIN — ESCITALOPRAM OXALATE 20 MG: 10 TABLET ORAL at 14:07

## 2022-08-27 RX ADMIN — MULTIPLE VITAMINS W/ MINERALS TAB 1 TABLET: TAB at 14:06

## 2022-08-27 RX ADMIN — QUETIAPINE FUMARATE 25 MG: 25 TABLET ORAL at 14:07

## 2022-08-27 RX ADMIN — DIAZEPAM 10 MG: 5 TABLET ORAL at 20:31

## 2022-08-27 RX ADMIN — FOLIC ACID 1 MG: 1 TABLET ORAL at 14:06

## 2022-08-27 RX ADMIN — CLONIDINE HYDROCHLORIDE 0.1 MG: 0.1 TABLET ORAL at 02:01

## 2022-08-27 RX ADMIN — Medication 100 MG: at 14:06

## 2022-08-27 ASSESSMENT — LIFESTYLE VARIABLES
SKIP TO QUESTIONS 9-10: 0
AUDIT-C TOTAL SCORE: 5

## 2022-08-27 ASSESSMENT — ACTIVITIES OF DAILY LIVING (ADL)
ADLS_ACUITY_SCORE: 28
ORAL_HYGIENE: INDEPENDENT
ADLS_ACUITY_SCORE: 28
HYGIENE/GROOMING: INDEPENDENT
DRESS: SCRUBS (BEHAVIORAL HEALTH);INDEPENDENT
ADLS_ACUITY_SCORE: 28
ORAL_HYGIENE: INDEPENDENT
LAUNDRY: UNABLE TO COMPLETE
ADLS_ACUITY_SCORE: 28
HYGIENE/GROOMING: INDEPENDENT
ORAL_HYGIENE: INDEPENDENT
DRESS: INDEPENDENT
ADLS_ACUITY_SCORE: 28
DRESS: INDEPENDENT
ADLS_ACUITY_SCORE: 28
HYGIENE/GROOMING: INDEPENDENT
ADLS_ACUITY_SCORE: 28

## 2022-08-27 NOTE — PLAN OF CARE
Problem: Behavioral Health Plan of Care  Goal: Patient-Specific Goal (Individualization)  Description: Patient will eat at least 50% of meals  Patient will attend at least 50% of groups  Patient will be medication compliant while on the unit  Patient will be compliant with treatment team recommendations.  Outcome: Ongoing, Progressing  Note:     1200 Patient up this morning, allowed vital signs at 0800 163/103 Clonidine 0.1 mg po given at that time for elevated blood pressure. Patient ate about 25% of breakfast meal. Patient steady on her feet, face is reddened. Expressed depressed mood and some thoughts of suicide but patient unable to contract for safety appearing to not trust writer. Reassurance given that we are here to help. Patient signed DEVONTE for  and sons this morning also and  phoned unit regarding visiting. Notified provider and no plan for visiting is in place until after she is assessed by NP. Patient's  agreeable to this. Patient's  states that patient has been drinking between 1 and 4 bottles of cabernet sauvigon daily. NP notified of patient's blood pressure and drinking history.    1430 Patient has been meeting with NP and also socializing with peers. Patient's  updated that patient does not want a visit at this time but can call for an update later and tomorrow as well. Patient's  agreeable. Patient ate about 25% of lunch meal. CIWA score is 5.    Face to face end of shift report communicated to 3-11 shift RN.     Laine Hoffman RN  8/27/2022  2:54 PM          Problem: Suicide Risk  Goal: Absence of Self-Harm  Description: Patient will be free from self harm while on the unit  Patient will notify staff of any thoughts of self harm  Outcome: Ongoing, Progressing     Problem: Thought Process Alteration  Goal: Optimal Thought Clarity  Outcome: Ongoing, Progressing     Problem: Suicidal Behavior  Goal: Suicidal Behavior is Absent or Managed  Outcome:  Ongoing, Progressing      Goal Outcome Evaluation:    Plan of Care Reviewed With: patient

## 2022-08-27 NOTE — PLAN OF CARE
"Problem: Behavioral Health Plan of Care  Goal: Patient-Specific Goal (Individualization)  Description: Patient will eat at least 50% of meals  Patient will attend at least 50% of groups  Patient will be medication compliant while on the unit  Patient will be compliant with treatment team recommendations.  Outcome: Ongoing, Not Progressing     Problem: Suicide Risk  Goal: Absence of Self-Harm  Description: Patient will be free from self harm while on the unit  Patient will notify staff of any thoughts of self harm  Outcome: Ongoing, Not Progressing     Problem: Thought Process Alteration  Goal: Optimal Thought Clarity  Outcome: Ongoing, Not Progressing  Note: Patient will verbalize improvement in paranoid thinking by discharge.    ADMISSION NOTE    Reason for admission Increasing depression, SI - plan to shoot self, paranoia  Safety concerns SI with plan.  Risk for or history of violence none noted.   Full skin assessment: complete, nothing of concern noted.    Patient arrived on unit from [place] ED accompanied by 2 [facility] [employer] security on 8/27/2022  11:49 PM.   Status on arrival: calm, cooperative & sleepy.  BP (!) 147/104   Pulse 107   Temp 97.6  F (36.4  C) (Temporal)   Resp 18   Ht 1.727 m (5' 8\")   Wt 73 kg (161 lb)   LMP 07/29/2015 (Exact Date)   SpO2 100%   BMI 24.48 kg/m  Patient given tour of unit and Welcome to  unit papers given to patient, wanding completed, belongings inventoried, and admission assessment completed.   Patient's legal status on arrival is Voluntary. Appropriate legal rights discussed with and copy given to patient. Patient Bill of Rights discussed with and copy given to patient.   Patient denies SI, HI, and thoughts of self harm and contracts for safety while on unit.      Lisha Shabazz RN  8/27/2022  12:41 AM    Patient states the reason she is here is because of her paranoia. She said it's been kind of like a puzzle this last year related to people present in her life " this past year. Patient did not mention the SI with plan to shoot self. She said there are guns in the house however, she is unsure how to use them or where everything is. She rates her depression 2/10 and anxiety 3/10. Denies SI/HI & pain. She states she lives in a house with her  and her boys. Her friends, family being outside in the sun, running & crafting make her happy. She believes things can get better and as the day has gone on she feels it even more so. She has had counseling in the past and was diagnosed with PTSD about 14 years ago. She said she has recently had difficulty falling asleep and has lost about 5 lbs this past week due to not feeling hungary. She stated she doesn't smoke or do drugs but does drink about 5 days a week and has 3-4 drinks, almost never more than 6 on any one day. She denies having been diagnosed with HTN, however her BP is elevated and was passed on in report that it was elevated in ER as well. Patient arrived with Christina Hardin 200-25mcg inhailer from previous facility.    0146 Rechecked Pt BP as she has now had time to relax since admission. Pt /106. Provider notified and Telephone read back order entered for clonidine 0.1mg BID PRN for HTN; systolic BP >160, diastolic BP >110.     0201 Patient received clonidine 0.1mg PO PRN for /106.    Patient slept for 5.75 hours and continues to be asleep at this time.     Face to face report will be communicated to oncoming RN.    Lisha Shabazz RN  8/27/2022  6:31 AM
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
## 2022-08-27 NOTE — PLAN OF CARE
"  Problem: Behavioral Health Plan of Care  Goal: Patient-Specific Goal (Individualization)  Description: Patient will eat at least 50% of meals  Patient will attend at least 50% of groups  Patient will be medication compliant while on the unit  Patient will be compliant with treatment team recommendations.  8/27/2022 1803 by Laine Hoffman RN  Outcome: Ongoing, Progressing  Note:     1700 Patient assessed for CIWA and scored an 8, discussed with patient taking the medication valium for symptoms but patient refused. Patient tearful and states she just wants to leave. Discussed with patient at length about making sure medication and treatments are working well for her before discharging patient and she nodded head. Patient states \"I'm all alone now, I have no support.\" Patient given reassurance regarding her family supporting her. Patient again nodded head. Patient currently endorses depression but denies thoughts of suicide at this time. Patient education regarding Seroquel provided to patient.    1800 Patient rested in room, ate soup for dinner. Patient came out now and is attending groups.    2030 Patient is in her room resting. Discussed with patient using some of the medication valium to help her rest and bring her blood pressure down. Patient agreeable. Patient also offered and given Trazodone 50 mg po at this time for sleep.    Face to face end of shift report communicated to 11-7 shift RN.     Laine Hoffman RN  8/27/2022  8:41 PM            Problem: Suicide Risk  Goal: Absence of Self-Harm  Description: Patient will be free from self harm while on the unit  Patient will notify staff of any thoughts of self harm  8/27/2022 1803 by Laine Hoffman RN  Outcome: Ongoing, Not Progressing     Problem: Thought Process Alteration  Goal: Optimal Thought Clarity  8/27/2022 1803 by Laine Hoffman RN  Outcome: Ongoing, Not Progressing     Problem: Suicidal Behavior  Goal: Suicidal Behavior is Absent or " Managed  8/27/2022 1803 by Laine Hoffman, RN  Outcome: Ongoing, Progressing      Goal Outcome Evaluation:    Plan of Care Reviewed With: patient

## 2022-08-27 NOTE — PROGRESS NOTES
08/27/22 0050   Patient Belongings   Did you bring any home meds/supplements to the hospital?  Yes   Disposition of meds  Sent to security/pharmacy per site process   Patient Belongings locker;remains with patient   Patient Belongings Remaining with Patient earrings  (bilateral ear piercing)   Patient Belongings Put in Hospital Secure Location (Security or Locker, etc.) clothing   Belongings Search Yes   Clothing Search Yes   Second Staff Mile CURRAN   Comment white underwear, white socks, burnt orange color scrub bottom and top     List items sent to safe: NONE    All other belongings put in assigned cubby in belongings room. Patients  brought in 3 gold rings, 2 of them have dulce like stones around them, and the other one has a bigger dulce like ston and small ones on each side. These were given to patient, she is wearing them.    I have reviewed my belongings list on admission and verify that it is correct.     Patient signature_______________________________    Second staff witness (if patient unable to sign) ______________________________     I have received all my belongings at discharge.    Patient signature________________________________    Lindsey LAWRENCE  8/27/2022  12:53 AM

## 2022-08-28 PROCEDURE — 250N000013 HC RX MED GY IP 250 OP 250 PS 637: Performed by: NURSE PRACTITIONER

## 2022-08-28 PROCEDURE — 99233 SBSQ HOSP IP/OBS HIGH 50: CPT | Performed by: NURSE PRACTITIONER

## 2022-08-28 PROCEDURE — 124N000001 HC R&B MH

## 2022-08-28 RX ORDER — QUETIAPINE FUMARATE 25 MG/1
25 TABLET, FILM COATED ORAL 3 TIMES DAILY
Qty: 90 TABLET | Refills: 1 | Status: SHIPPED | OUTPATIENT
Start: 2022-08-28 | End: 2022-08-28

## 2022-08-28 RX ORDER — TRAZODONE HYDROCHLORIDE 50 MG/1
50 TABLET, FILM COATED ORAL
Qty: 30 TABLET | Refills: 1 | Status: CANCELLED | OUTPATIENT
Start: 2022-08-28

## 2022-08-28 RX ORDER — DIAZEPAM 10 MG
10 TABLET ORAL 3 TIMES DAILY PRN
Qty: 15 TABLET | Refills: 0 | Status: SHIPPED | OUTPATIENT
Start: 2022-08-28 | End: 2022-08-28

## 2022-08-28 RX ORDER — DIAZEPAM 10 MG
10 TABLET ORAL 3 TIMES DAILY PRN
Qty: 15 TABLET | Refills: 0 | Status: SHIPPED | OUTPATIENT
Start: 2022-08-28 | End: 2024-03-12

## 2022-08-28 RX ORDER — QUETIAPINE FUMARATE 25 MG/1
25 TABLET, FILM COATED ORAL 3 TIMES DAILY
Qty: 90 TABLET | Refills: 1 | Status: SHIPPED | OUTPATIENT
Start: 2022-08-28 | End: 2024-03-12

## 2022-08-28 RX ADMIN — Medication 100 MG: at 08:19

## 2022-08-28 RX ADMIN — MULTIPLE VITAMINS W/ MINERALS TAB 1 TABLET: TAB at 08:20

## 2022-08-28 RX ADMIN — DIAZEPAM 10 MG: 5 TABLET ORAL at 11:45

## 2022-08-28 RX ADMIN — FOLIC ACID 1 MG: 1 TABLET ORAL at 08:19

## 2022-08-28 RX ADMIN — FLUTICASONE FUROATE AND VILANTEROL TRIFENATATE 1 PUFF: 200; 25 POWDER RESPIRATORY (INHALATION) at 08:36

## 2022-08-28 RX ADMIN — ESCITALOPRAM OXALATE 20 MG: 10 TABLET ORAL at 08:19

## 2022-08-28 RX ADMIN — QUETIAPINE FUMARATE 25 MG: 25 TABLET ORAL at 08:20

## 2022-08-28 RX ADMIN — QUETIAPINE FUMARATE 25 MG: 25 TABLET ORAL at 21:18

## 2022-08-28 ASSESSMENT — ACTIVITIES OF DAILY LIVING (ADL)
ADLS_ACUITY_SCORE: 29
ORAL_HYGIENE: INDEPENDENT
HYGIENE/GROOMING: INDEPENDENT
ADLS_ACUITY_SCORE: 29
HYGIENE/GROOMING: INDEPENDENT
ADLS_ACUITY_SCORE: 28
ADLS_ACUITY_SCORE: 28
ADLS_ACUITY_SCORE: 29
LAUNDRY: UNABLE TO COMPLETE
DRESS: SCRUBS (BEHAVIORAL HEALTH);INDEPENDENT
ADLS_ACUITY_SCORE: 29
ADLS_ACUITY_SCORE: 28
DRESS: SCRUBS (BEHAVIORAL HEALTH);INDEPENDENT
ADLS_ACUITY_SCORE: 28
ORAL_HYGIENE: INDEPENDENT
ADLS_ACUITY_SCORE: 28
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
## 2022-08-28 NOTE — PLAN OF CARE
Problem: Behavioral Health Plan of Care  Goal: Patient-Specific Goal (Individualization)  Description: Patient will eat at least 50% of meals  Patient will attend at least 50% of groups  Patient will be medication compliant while on the unit  Patient will be compliant with treatment team recommendations.  Outcome: Ongoing, Progressing     Problem: Suicide Risk  Goal: Absence of Self-Harm  Description: Patient will be free from self harm while on the unit  Patient will notify staff of any thoughts of self harm  Outcome: Ongoing, Progressing     Problem: Thought Process Alteration  Goal: Optimal Thought Clarity  Outcome: Ongoing, Progressing     Problem: Suicidal Behavior  Goal: Suicidal Behavior is Absent or Managed  Outcome: Ongoing, Progressing     Face to face shift report received from Laine Salinas completed, pt observed laying in bed appeared to be sleeping.    Patient appeared to be sleeping for approximately 7.75 hours since 2115.    CIWA:   0030 - 0 - pt sleeping, appears comfortable  0430 - 0 - pt sleeping, appears comfortable  0637 -1 - pt denies all CIWA criteria, no tremors noted, slight sweat on forehead.    Patient had no reported or observed self harm this shift.      Face to face report will be communicated to oncoming RN.    Lisha Shabazz RN  8/28/2022  6:41 AM
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
## 2022-08-28 NOTE — PROGRESS NOTES
"Pt referred with malnutrition score of 2 - weight loss 2-13#, reduced appetite.      52 yof admitted with suicidal ideation, psychotic features, daily alcohol use.     Ht-68\", Wt-161#.  IBWR is 126-154#.  BMI is 24.5.   Pt with approximate 12# weight loss since the beginning of the year per chart review.      No significant labs noted.      Meds include:  multivitamin with minerals, thiamine, folic acid.      Regular diet ordered.  Intake noted to be 100%, 100%, 25% of meals thus far.     Estimated nutritional needs:  0607-0536 calories (25-30 kcal/kg), 70 gm protein (1.0 gm/kg).      Pt does not meet necessary criteria for malnutrition.  No nutrition interventions indicated at this time.  RD will monitor weights/intake during stay.    "

## 2022-08-29 ENCOUNTER — TELEPHONE (OUTPATIENT)
Dept: BEHAVIORAL HEALTH | Facility: CLINIC | Age: 52
End: 2022-08-29
Payer: COMMERCIAL

## 2022-08-29 VITALS
HEIGHT: 68 IN | WEIGHT: 161 LBS | DIASTOLIC BLOOD PRESSURE: 90 MMHG | OXYGEN SATURATION: 99 % | HEART RATE: 86 BPM | BODY MASS INDEX: 24.4 KG/M2 | RESPIRATION RATE: 16 BRPM | SYSTOLIC BLOOD PRESSURE: 151 MMHG | TEMPERATURE: 97.7 F

## 2022-08-29 DIAGNOSIS — F32.3 MAJOR DEPRESSIVE DISORDER, SINGLE EPISODE, SEVERE WITH PSYCHOTIC FEATURES (H): Primary | ICD-10-CM

## 2022-08-29 PROCEDURE — 250N000013 HC RX MED GY IP 250 OP 250 PS 637: Performed by: NURSE PRACTITIONER

## 2022-08-29 PROCEDURE — 99207 PR NO CHARGE LOS: CPT

## 2022-08-29 PROCEDURE — 99239 HOSP IP/OBS DSCHRG MGMT >30: CPT | Performed by: NURSE PRACTITIONER

## 2022-08-29 RX ADMIN — QUETIAPINE FUMARATE 25 MG: 25 TABLET ORAL at 08:55

## 2022-08-29 RX ADMIN — FLUTICASONE FUROATE AND VILANTEROL TRIFENATATE 1 PUFF: 200; 25 POWDER RESPIRATORY (INHALATION) at 09:06

## 2022-08-29 RX ADMIN — ESCITALOPRAM OXALATE 20 MG: 10 TABLET ORAL at 08:54

## 2022-08-29 RX ADMIN — Medication 100 MG: at 08:54

## 2022-08-29 RX ADMIN — FOLIC ACID 1 MG: 1 TABLET ORAL at 08:54

## 2022-08-29 RX ADMIN — MULTIPLE VITAMINS W/ MINERALS TAB 1 TABLET: TAB at 08:54

## 2022-08-29 ASSESSMENT — ACTIVITIES OF DAILY LIVING (ADL)
ADLS_ACUITY_SCORE: 29
ADLS_ACUITY_SCORE: 29
HYGIENE/GROOMING: INDEPENDENT
DRESS: SCRUBS (BEHAVIORAL HEALTH)
ADLS_ACUITY_SCORE: 29
ADLS_ACUITY_SCORE: 29
ORAL_HYGIENE: INDEPENDENT
ADLS_ACUITY_SCORE: 29
ADLS_ACUITY_SCORE: 29

## 2022-08-29 NOTE — PLAN OF CARE
Discharge Note    Patient-1217 AM via Private Car accompanied by unit assistant walks pt to awaiting spouse.     Patient and family informed of discharge instructions in AVS. patient and significant other verbalizes understanding and denies having any questions pertaining to AVS. Patient stable at time of discharge. Patient denies SI, HI, and thoughts of self harm at time of discharge. All personal belongings returned to patient. Discharge prescriptions sent to [facility] via electronic communication.   Continue with plan set up at [facility].  Pt  has concerns regarding discharge. These concerns are conveyed to Provider.this writer passes on to pt  that pt is safe to discharge--- pt no longer meets criteria for inpatient status as she is not a harm to herself or others. Pt to follow through as per appointments set up by [facility] (med management, therapy, and need to reschedule appt with [facility] for possible etoh tx.    request to speak with MD- Dr. Castillo returns call to pt .    Pt feels ready to discharge. Discharge instructions discussed in depth. Importance of abstaining from ETOH and following through with  appointments set up via [place] is vital for her well being. Pt verbalizes understanding.     Lisha Su, RN  8/29/2022  11:50 AM    Problem: Behavioral Health Plan of Care  Goal: Plan of Care Review  Outcome: Adequate for Care Transition  Flowsheets (Taken 8/29/2022 0800)  Plan of Care Reviewed With: patient  Patient Agreement with Plan of Care: agrees     Goal: Adheres to Safety Considerations for Self and Others  Outcome: Adequate for Care Transition  Intervention: Develop and Maintain Individualized Safety Plan  Recent Flowsheet Documentation  Taken 8/29/2022 0800 by Lisha Su, RN  Safety Measures:    environmental rounds completed    safety rounds completed    suicide assessment completed  Goal:  Absence of New-Onset Illness or Injury  Outcome: Adequate for Care Transition  Intervention: Identify and Manage Fall Risk  Recent Flowsheet Documentation  Taken 8/29/2022 0800 by Lisha Su RN  Safety Measures:    environmental rounds completed    safety rounds completed    suicide assessment completed  Goal: Optimal Comfort and Wellbeing  Outcome: Adequate for Care Transition  Intervention: Provide Person-Centered Care  Recent Flowsheet Documentation  Taken 8/29/2022 0800 by Lisha Su RN  Trust Relationship/Rapport:    care explained    choices provided    emotional support provided    questions answered    reassurance provided    thoughts/feelings acknowledged  Goal: Optimized Coping Skills in Response to Life Stressors  Outcome: Adequate for Care Transition  Goal: Develops/Participates in Therapeutic Cameron to Support Successful Transition  Outcome: Adequate for Care Transition  Intervention: Foster Therapeutic Cameron  Recent Flowsheet Documentation  Taken 8/29/2022 0800 by Lisha Su RN  Trust Relationship/Rapport:    care explained    choices provided    emotional support provided    questions answered    reassurance provided    thoughts/feelings acknowledged   Goal Outcome Evaluation:    Plan of Care Reviewed With: patient

## 2022-08-29 NOTE — TELEPHONE ENCOUNTER
[facility] Phone Encounter    Encounter Activities (Refresh/Reselect every encounter): NEW and [facility] Only  Type of Contact Today: Phone call (patient / identified key support person reached)  Date of phone call: August 29, 2022                   Presenting Issue: follow up from inpatient stay  [facility] Patient: No  MI Intervention: Expressed Empathy/Understanding, Supported Autonomy, Collaboration, Evocation and Reflections: simple and complex     Pt answered the phone when writer called. Pt and spouse expressed frustration regarding the staff at the ED/hospital and felt they were not heard regarding her mental health needs. Writer validated their verbalized emotions, offered reflective feedback, assisted in problem solving and offering emotional reassurances. Writer confirmed appointments listed below. Pt stated she was unable to do the ANTHONY assessment and will get this rescheduled once she arrives back home from [place]. Writer clarified that the outpatient service connected to the [place] [facility] is for psychotherapy, not psychiatry, therefore they will need to find another psychiatrist to become established with, as the med mgmt appointment upcoming is through the [facility] (per ). Writer will Mychart message a few options close to where they live for them to explore.    Safety Concerns:  Risk status (Self / Other harm or suicidal ideation)  Patient denies current fears or concerns for personal safety.  Patient reports the following current or recent suicidal ideation or behaviors: shooting self with gun (prior to inpatient stay).  Patient denies current or recent homicidal ideation or behaviors.  Patient denies current or recent self injurious behavior or ideation.  Patient denies other safety concerns.    Substance Used Disorder (ANTHONY) assessment   Provider: Magdalene Espino   [facility]  Date: 8/29/22  Time:  11A   Mode: VIRTUAL    To note: Will be rescheduled today as she  just got discharged from inpatient.     Medication Management  Provider: Adalid Cortez  Psychiatric/Mental Health Nurse Practitioner  [facility]  Address: [address] [place] [address]  Source: [email or web address]  Date/Time  Wednesday, 2022  Time: 3:00 pm - 4:00 pm Mode: VIRTUAL    To note: Because this is through the Transition clinic (therefore temporary) writer sent pt a few options for [facility] psychiat[facility]      Behavioral Health Clinician (Psychotherapy)  Provider: STEPHANI Lewis, LAURY  [address] [place] [address]  Date: 22 Time: 11AM-12P  Mode: VIRTUAL    To note: Writer encouraged pt to complete assigned assessments prior to scheduled appointment.     Disposition:   A safety and risk management plan has been developed including: See previous crisis notes in past week, including safety plan. Pt was given a hard copy of this to follow. Both ED discharges and inpatient stay included safety plans which they were given upon discharge. Mitigating safety risk and symptom stabilization includes future appointments for an ANTHONY assessment, medication management follow up, and brief psychotherapy.    STEPHANI Lewis, LAURY  Behavioral Health Clinician  [facility]  [address] [place] [address]  [phone]

## 2022-08-29 NOTE — DISCHARGE SUMMARY
[facility] PSYCHIATRY  DISCHARGE SUMMARY     DISCHARGE DATA     Neeta Verde MRN# [id number]   Age: 52 year old YOB: 1970     Date of Admission: 8/26/2022  Date of Discharge: August 29, 2022  Discharge Provider: AVERY Martin CNP       REASON FOR ADMISSION     This is a 52 year old female with a PMH of MDD, DAVID, PTSD, and alcohol abuse who presented to the ED with worsening depression, anxiety, and paranoia. She expressed suicidal ideation with a plan to shoot herself with a gun. She admits she does not even know how to use a gun, but did begin searching her house for one, which is what prompted her  to call [facility] for a psychiatric assessment. She reports symptoms consistent with severe depression (with paranoia) as well as DAVID and PTSD and she appears to be consuming excessive amounts of alcohol to cope. The patient's  reports all of the guns have been removed from their home, but is concerned about keeping Neeta safe upon returning home. Will try a quick acting antipsychotic and focus on getting her sleep patterns regulated while here.        DISCHARGE DIAGNOSES     1.MDD, recurrent, severe, with psychosis  2.DAVID  3.PTSD  4.Alcohol use disorder, severe       CONSULTS     None       HOSPITAL COURSE     Legal status: Orders Placed This Encounter      Voluntary    Patient was admitted to unit 5 due to the aforementioned presentation. The patient was placed under 15 minute checks to ensure patient safety. The patient participated in unit programming and groups as able.    The following changes to the patient's psychiatric medications were made:      PTA medications held:      -Abilify 5 mg once daily - stopped in favor of alternate antipsychotic     PTA medications continued/changed:      -Lexapro 20 mg once daily     New medications initiated:      -Monitor for ETOH w/d and medicate per [facility] protocol  -Started folic acid, MVI, and thiamine per [facility] protocol  -Start Seroquel  25 mg TID for anxiety and paranoia  -Start trazodone 50 mg PRN before bed. May repeat dose x1       With these changes and supports the patient noticed improvement in their symptoms and felt sufficiently ready for discharge. As a result, Neeta Verde was discharged. At the time of discharge, Neeta Verde was determined to not be a danger to self or others. The patient was also medically stable for discharge. At the current time of discharge, the patient does not meet criteria for involuntary hospitalization. On the day of discharge, the patient reports that they do not have suicidal or homicidal ideation. Steps taken to minimize risk include: assessing patient s behavior and thought process daily during hospital stay, discharging patient with adequate plan for follow up for mental and physical health and discussing safety plan of returning to the hospital should the patient ever have thoughts of harming themselves or others. Therefore, based on all available evidence including the factors cited above, the patient does not appear to be at imminent risk for self-harm, and is appropriate for outpatient level of care. However, if patient uses substances or is medication non-adherent, their risk of decompensation and SI will be elevated. This was discussed with the patient.       DISCHARGE MEDICATIONS     Current Discharge Medication List      START taking these medications    Details   diazepam (VALIUM) 10 MG tablet Take 1 tablet (10 mg) by mouth 3 times daily as needed for withdrawal or anxiety  Qty: 15 tablet, Refills: 0    Associated Diagnoses: Alcohol withdrawal syndrome without complication (H)      QUEtiapine (SEROQUEL) 25 MG tablet Take 1 tablet (25 mg) by mouth 3 times daily  Qty: 90 tablet, Refills: 1    Associated Diagnoses: Major depressive disorder, recurrent episode, moderate with mood-congruent psychotic features (H)         CONTINUE these medications which have NOT CHANGED    Details   albuterol (PROAIR  "HFA/PROVENTIL HFA/VENTOLIN HFA) 108 (90 Base) MCG/ACT inhaler Inhale 2 puffs into the lungs every 4 hours as needed for wheezing  Qty: 18 g, Refills: 5    Comments: Pharmacy may dispense brand covered by insurance (Proair, or proventil or ventolin or generic albuterol inhaler)  Associated Diagnoses: Moderate persistent asthma without complication      azelastine (ASTELIN) 0.1 % nasal spray Spray 2 sprays into both nostrils 2 times daily as needed for rhinitis  Qty: 30 mL, Refills: 3    Associated Diagnoses: Allergic rhinitis due to animal dander      escitalopram (LEXAPRO) 20 MG tablet Take 1 tablet (20 mg) by mouth daily  Qty: 90 tablet, Refills: 3    Associated Diagnoses: Major depressive disorder with single episode, in remission (H)      fexofenadine (ALLEGRA) 180 MG tablet Take 1 tablet (180 mg) by mouth daily      fluticasone-salmeterol (AIRDUO RESPICLICK) 232-14 MCG/ACT inhaler Inhale 1 puff into the lungs 2 times daily  Qty: 1 each, Refills: 4    Associated Diagnoses: Moderate persistent asthma without complication         STOP taking these medications       ARIPiprazole (ABILIFY) 5 MG tablet Comments:   Reason for Stopping:                  MENTAL STATUS EXAM   Vitals: /90 (BP Location: Left arm)   Pulse 86   Temp 97.7  F (36.5  C) (Temporal)   Resp 16   Ht 1.727 m (5' 8\")   Wt 73 kg (161 lb)   LMP 07/29/2015 (Exact Date)   SpO2 99%   BMI 24.48 kg/m      Appearance: Alert, oriented, dressed in hospital scrubs, appears stated age   Attitude: Cooperative   Eye Contact: Good  Mood: \"Better\"  Affect: Full range of affect  Speech: Normal rate and rhythm   Psychomotor Behavior: No tremor, rigidity, or psychomotor abnormality   Thought Process: Logical, goal directed   Associations: No loose associations   Thought Content: Denies SI or plan. No SIB. Denies A/V hallucinations. Some mild paranoia persists, but patient states she is able to challenge these thoughts and does not find them as distressing " as when she first came in.    Insight: Fair  Judgment: Good  Oriented to: Person, place, and time  Attention Span and Concentration: Intact  Recent and Remote Memory: Intact  Language: English with appropriate syntax and vocabulary  Fund of Knowledge: Average  Muscle Strength and Tone: Grossly normal  Gait and Station: Grossly normal       DISCHARGE PLAN     1.  Education given regarding diagnostic and treatment options with risks, benefits and alternatives with adequate verbalization of understanding.  2.  Discharge to home with . Upon detailed review of risk factors, patient amenable for release.   3.  Continue aforementioned medications and associated medication changes with follow-up by outpatient provider.  4.  Crisis management planning in place.    5.  Nursing and  to review further discharge recommendations.   6.  Patient is being discharged to home with the following appointments as detailed below.    Patient will be attending her pre-arranged appts as previously scheduled by staff at [facility].        DISCHARGE SERVICES PROVIDED     40 minutes spent on discharge services, including:  Final examination of patient.  Review and discussion of hospital stay.  Instructions for continued outpatient care/goals.  Preparation of discharge records.  Preparation of medications refills and new prescriptions.  Preparation of applicable referral forms.        ATTESTATION     AVERY Martin CNP       LABS THIS ADMISSION     Results for orders placed or performed during the hospital encounter of 08/26/22   Phosphorus     Status: Normal   Result Value Ref Range    Phosphorus 3.6 2.5 - 4.5 mg/dL   Magnesium     Status: Normal   Result Value Ref Range    Magnesium 2.2 1.6 - 2.3 mg/dL   Urine Drugs of Abuse Screen Panel 13     Status: Normal   Result Value Ref Range    Cannabinoids (18-ymk-1-carboxy-9-THC) Not Detected Not Detected, Indeterminate    Phencyclidine Not Detected Not  Detected, Indeterminate    Cocaine (Benzoylecgonine) Not Detected Not Detected, Indeterminate    Methamphetamine (d-Methamphetamine) Not Detected Not Detected, Indeterminate    Opiates (Morphine) Not Detected Not Detected, Indeterminate    Amphetamine (d-Amphetamine) Not Detected Not Detected, Indeterminate    Benzodiazepines (Nordiazepam) Not Detected Not Detected, Indeterminate    Tricyclic Antidepressants (Desipramine) Not Detected Not Detected, Indeterminate    Methadone Not Detected Not Detected, Indeterminate    Barbiturates (Butalbital) Not Detected Not Detected, Indeterminate    Oxycodone Not Detected Not Detected, Indeterminate    Propoxyphene (Norpropoxyphene) Not Detected Not Detected, Indeterminate    Buprenorphine Not Detected Not Detected, Indeterminate   Urine Drugs of Abuse Screen     Status: Normal    Narrative    The following orders were created for panel order Urine Drugs of Abuse Screen.  Procedure                               Abnormality         Status                     ---------                               -----------         ------                     Urine Drugs of Abuse Scr...[[id number]]  Normal              Final result                 Please view results for these tests on the individual orders.

## 2022-08-29 NOTE — PLAN OF CARE
Problem: Behavioral Health Plan of Care  Goal: Patient-Specific Goal (Individualization)  Description: Patient will eat at least 50% of meals  Patient will attend at least 50% of groups  Patient will be medication compliant while on the unit  Patient will be compliant with treatment team recommendations.  Outcome: Ongoing, Progressing     Problem: Suicide Risk  Goal: Absence of Self-Harm  Description: Patient will be free from self harm while on the unit  Patient will notify staff of any thoughts of self harm  Outcome: Ongoing, Progressing     Problem: Thought Process Alteration  Goal: Optimal Thought Clarity  Outcome: Ongoing, Progressing     Problem: Suicidal Behavior  Goal: Suicidal Behavior is Absent or Managed  Outcome: Ongoing, Progressing     Face to face shift report received from Laine ALDANA RN. Rounding completed, pt observed laying in bed, appeared to be sleeping.    CIWA:  0100 - 0, pt sleeping appears comfortable, no signs of distress noted.  0500 - 0, pt sleeping appears comfortable, no signs of distress noted.    Patient appeared to be sleeping for approximately 6.5 hours since 2245 last shift.    Patient had no reported or observed delusional thinking.      Face to face report will be communicated to oncoming RN.    Lisha Shabazz RN  8/29/2022  6:22 AM

## 2022-08-29 NOTE — PROGRESS NOTES
Miscellaneous Note:    Spoke with patient's  on the phone (DEVONTE signed) regarding patient's hospitalization stay and what was to expect in the coming days.  Stressed the importance of medication adherence and following up with mental health providers including prescriber and therapist. We also discussed the importance of sobriety and how it would be extremely helpful to get patient into a chemical dependency program.  He expressed his frustration with the mental health system and some of the barriers to receiving care including what is and what is not allowed in terms of involuntary hospitalization.  We reviewed the patient's clinical course and the medications prescribed.  He thanked writer for the telephone conversation and was made aware that the treatment team is going to proceed forward with dismissal today.  No additional acute safety concerns identified or shared.      Cong Castillo MD

## 2022-08-29 NOTE — PLAN OF CARE
Problem: Behavioral Health Plan of Care  Goal: Patient-Specific Goal (Individualization)  Description: Patient will eat at least 50% of meals  Patient will attend at least 50% of groups  Patient will be medication compliant while on the unit  Patient will be compliant with treatment team recommendations.  Outcome: Ongoing, Progressing  Note: Pt had a visit with her  prior to supper. Visit went well. Pt plans to discharge home with her  tomorrow and follow-up outpatient. Pt's thought process was logical, linear, and goal oriented tonight. She did not have any paranoia or alcohol withdrawal symptoms other than some mild anxiety. Her affect was bright. She was compliant with her scheduled Seroquel.     Face to face end of shift report communicated to oncoming RN.      Problem: Suicide Risk  Goal: Absence of Self-Harm  Description: Patient will be free from self harm while on the unit  Patient will notify staff of any thoughts of self harm  Outcome: Ongoing, Progressing  Note: Pt denied suicidal ideation. She remained free from self harm.      Problem: Thought Process Alteration  Goal: Optimal Thought Clarity  Outcome: Ongoing, Progressing  Note: Pt's thought process was logical, linear, and goal oriented.      Goal Outcome Evaluation:    Plan of Care Reviewed With: patient

## 2022-09-01 ENCOUNTER — TELEPHONE (OUTPATIENT)
Dept: BEHAVIORAL HEALTH | Facility: CLINIC | Age: 52
End: 2022-09-01

## 2022-09-01 ENCOUNTER — HOSPITAL ENCOUNTER (OUTPATIENT)
Dept: BEHAVIORAL HEALTH | Facility: CLINIC | Age: 52
Discharge: HOME | End: 2022-09-01
Attending: FAMILY MEDICINE | Admitting: FAMILY MEDICINE
Payer: COMMERCIAL

## 2022-09-01 PROCEDURE — 90791 PSYCH DIAGNOSTIC EVALUATION: CPT | Mod: TS,GT,95 | Performed by: COUNSELOR

## 2022-09-01 ASSESSMENT — COLUMBIA-SUICIDE SEVERITY RATING SCALE - C-SSRS
4. HAVE YOU HAD THESE THOUGHTS AND HAD SOME INTENTION OF ACTING ON THEM?: NO
SUICIDE, SINCE LAST CONTACT: NO
1. IN THE PAST MONTH, HAVE YOU WISHED YOU WERE DEAD OR WISHED YOU COULD GO TO SLEEP AND NOT WAKE UP?: YES
6. HAVE YOU EVER DONE ANYTHING, STARTED TO DO ANYTHING, OR PREPARED TO DO ANYTHING TO END YOUR LIFE?: NO
TOTAL  NUMBER OF ABORTED OR SELF INTERRUPTED ATTEMPTS SINCE LAST CONTACT: NO
5. HAVE YOU STARTED TO WORK OUT OR WORKED OUT THE DETAILS OF HOW TO KILL YOURSELF? DO YOU INTEND TO CARRY OUT THIS PLAN?: NO
6. IN YOUR LIFETIME, HAVE YOU EVER DONE ANYTHING, STARTED TO DO ANYTHING, OR PREPARED TO DO ANYTHING TO END YOUR LIFE?: NO
TOTAL  NUMBER OF INTERRUPTED ATTEMPTS SINCE LAST CONTACT: NO
ATTEMPT SINCE LAST CONTACT: NO
3. HAVE YOU BEEN THINKING ABOUT HOW YOU MIGHT KILL YOURSELF?: YES
2. HAVE YOU ACTUALLY HAD ANY THOUGHTS OF KILLING YOURSELF?: YES
2. HAVE YOU ACTUALLY HAD ANY THOUGHTS OF KILLING YOURSELF?: NO
1. SINCE LAST CONTACT, HAVE YOU WISHED YOU WERE DEAD OR WISHED YOU COULD GO TO SLEEP AND NOT WAKE UP?: NO

## 2022-09-01 NOTE — TELEPHONE ENCOUNTER
Unable to get a hold of patient with second call to check in. Writer left a second voicemail with writer's call back number. Writer will inform patient's .

## 2022-09-01 NOTE — TELEPHONE ENCOUNTER
Patient have a video appointment today at 11am with [facility]. Writer placed a call this morning to check in patient. Unable to get a hold of patient. Writer left a voicemail with writer's call back number.

## 2022-09-01 NOTE — PROGRESS NOTES
"Adult Diagnostic Assessment Update    [facility]  Provider Name:  Ro Villalobos     Credentials:  [facility]    PATIENT'S NAME: Neeta Verde  PREFERRED NAME: Neeta  PRONOUNS: she/her/hers     MRN:   [id number]  :   1970   ACCT. NUMBER: [id number]  DATE OF SERVICE: 22  START TIME: 1100  END TIME: 1200  PREFERRED PHONE: [phone]   May we leave a program related message: Yes  SERVICE MODALITY:  Video Visit:      Provider verified identity through the following two step process.  Patient provided:  Patient  and Patient address    Telemedicine Visit: The patient's condition can be safely assessed and treated via synchronous audio and visual telemedicine encounter.      Reason for Telemedicine Visit: Services only offered telehealth    Originating Site (Patient Location): Patient's home    Distant Site (Provider Location): Provider Remote Setting- Home Office    Consent:  The patient/guardian has verbally consented to: the potential risks and benefits of telemedicine (video visit) versus in person care; bill my insurance or make self-payment for services provided; and responsibility for payment of non-covered services.     Patient would like the video invitation sent by:  My Chart    Mode of Communication:  Video Conference via Amwell    As the provider I attest to compliance with applicable laws and regulations related to telemedicine.    Provider reviewed initial DA dated:  22 Provider Name:  Gauri Mays     Credentials: MSW, LAURY    Chief Complaint:   The reason for seeking services at this time is: \" address substance use concerns \"   The problem(s) began over the past few years. Patient has attempted to resolve these concerns in the past through limiting alcohol consumption.    Patient would like the following family members involved in services spouse by including them in treatment programming.    Current Stressors/Losses/Concerns: " Patient denies.    Patient reports current meds as:   Outpatient Medications Marked as Taking for the 9/1/22 encounter (Hospital Encounter) with Ro Villalobos [facility]   Medication Sig     albuterol (PROAIR HFA/PROVENTIL HFA/VENTOLIN HFA) 108 (90 Base) MCG/ACT inhaler Inhale 2 puffs into the lungs every 4 hours as needed for wheezing     azelastine (ASTELIN) 0.1 % nasal spray Spray 2 sprays into both nostrils 2 times daily as needed for rhinitis     diazepam (VALIUM) 10 MG tablet Take 1 tablet (10 mg) by mouth 3 times daily as needed for withdrawal or anxiety     escitalopram (LEXAPRO) 20 MG tablet Take 1 tablet (20 mg) by mouth daily     fexofenadine (ALLEGRA) 180 MG tablet Take 1 tablet (180 mg) by mouth daily     fluticasone-salmeterol (AIRDUO RESPICLICK) 232-14 MCG/ACT inhaler Inhale 1 puff into the lungs 2 times daily     QUEtiapine (SEROQUEL) 25 MG tablet Take 1 tablet (25 mg) by mouth 3 times daily       Medication Adherence:  Patient reports taking prescribed medications as prescribed    Patient Allergies:    Allergies   Allergen Reactions     Animal Dander      Azithromycin      Zithromax - Racing heart     Birch Trees      Dust Mites      Moxifloxacin Hydrochloride      Avelox - Labored breathing     Pollen Extract        Medical History:    Past Medical History:   Diagnosis Date     ASCUS favor benign 8/2015    Neg HPV     Depressive disorder      Moderate persistent asthma without complication      Uncomplicated asthma 1/30/20    Went to ER and followup with Allergy Doc       Since the initial DA, Neeta:  denies changes in her medical history.    denies changes in her living situation.    denies changes in her employment.    denies changes regarding financial concerns or gambling behavior.   denies  changes in education status.   denies ability to meet her basic needs.   Since the initial DA, the Neeta denies changes with her relationships/support system.       Significant Losses / Trauma / Abuse /  "Neglect Issues:   Since the initial DA, Neeta denies new losses/trauma/abuse/neglect issues.     Substance Use:  Patient reported no family history of chemical health issues.  Patient has not received substance use disorder and/or gambling treatment in the past.  Patient has not ever been to detox.  Patient is not currently receiving any chemical dependency treatment. Patient reports no history of support group attendance.      Substance History of use Age of first use Pattern and duration of use (include amounts and frequency) Date of last use     [facility] of [facility]   Alcohol used in the past   15 Daily use about one bottle per day and has been consistent for the past 4-5 days. 8/25/2022 No oral   Cannabis   never used          Amphetamines   never used          Cocaine/crack    never used            Hallucinogens never used              Inhalants never used              Heroin never used            Other Opiates never used          Benzodiazepine   never used         Barbiturates never used         Over the counter meds never used          Caffeine never used          Nicotine  never used         other substances not listed above:  Identify:  never used             Patient reported the following problems as a result of their substance use:relationship problems  .  Patient is concerned about substance use. Patient reports partner and mental health providers are concerned about their substance use.  Patient reports their recovery goals are \"havent thought about it though have been enjoying finding things to do this past week\".     Patient reports experiencing the following withdrawal symptoms within the past 12 months: none and the following within the past 30 days: none.   Patients denies urges to use Alcohol.  Patient reports she has used more Alcohol than intended and over a longer period of time than intended. Patient reports she has not had unsuccessful attempts to cut down or control " use of Alcohol.  Patient reports longest period of abstinence was prior to 4 years ago and return to use was due to unknown. Patient reports she has not needed to use more Alcohol to achieve the same effect.  Patient does not report diminished effect with use of same amount of Alcohol.     Patient does not report a great deal of time is spent in activities necessary to obtain, use, or recover from Alcohol effects.  Patient does not report important social, occupational, or recreational activities are given up or reduced because of Alcohol use.  Alcohol use is continued despite knowledge of having a persistent or recurrent physical or psychological problem that is likely to have caused or exacerbated by use.  Patient reports the following problem behaviors while under the influence of substances: blackouts, passing out.     Patient reports substance use has not ever impacted their ability to function in a school setting. Patient reports substance use has not ever impacted their ability to function in a work setting.  Patients demographics and history impact their recovery in the following ways:  Patient has been able to set limits regarding alcohol use though struggles to see sobriety as beneficial.  Patient reports engaging in the following recreation/leisure activities while using:  Patient denies - it is more so consuming alcohol when doing day to day activities.  Patient reports the following people are supportive of recovery: family, friends.    Patient does not have a history of gambling concerns and/or treatment.  Patient does not have other addictive behaviors she is concerned about.    **    Dimension Scale Ratings:    Dimension 1 -  Acute Intoxication/Withdrawal: 0 - No Problem Patient denies withdrawal concerns and complications.  Dimension 2 - Biomedical: 1 - Minor Problem Patient denies medical complications and concerns.  Dimension 3 - Emotional/Behavioral/Cognitive Conditions: 2 - Moderate Problem  Patient identifies mental health concerns that are impacted due to patients use of substances  Dimension 4 - Readiness to Change:  2 - Moderate Problem Patient is motivated with active reinforcement.  Dimension 5 - Relapse/Continued Use/ Continued Problem Potential: 2 - Moderate Problem Patient is at a higher risk for relapse as patient lacks skills and strategies to manage mental and chemical health issues.  Dimension 6 - Recovery Environment:  1 - Minor Problem Patient has a structured and stable environment, denies legal issues.        Current Mental Status Exam:   Appearance:  Appropriate    Eye Contact:  Good   Psychomotor:  Normal       Gait / station:  no problem  Attitude / Demeanor: Cooperative  Interested  Speech      Rate / Production: Normal/ Responsive      Volume:  Normal  volume      Language:  intact  Mood:   Normal  Affect:   Appropriate    Thought Content: Clear   Thought Process: Logical       Associations: No loosening of associations  Insight:   Good   Judgment:  Intact   Orientation:  All  Attention/concentration: Good    Rating Scales:    PHQ9:    PHQ-9 SCORE 5/7/2020 1/25/2022 8/30/2022   PHQ-9 Total Score - - -   PHQ-9 Total Score MyChart - - 12 (Moderate depression)   PHQ-9 Total Score 0 1 12   ;    GAD7:    DAVID-7 SCORE 5/7/2020 1/25/2022 8/30/2022   Total Score - - -   Total Score - - 7 (mild anxiety)   Total Score 1 0 7     CGI:     First:Considering your total clinical experience with this particular patient population, how severe are the patient's symptoms at this time?: 5 (9/1/2022 11:12 AM)  ;    Most recentCompared to the patient's condition at the START of treatment, this patient's condition is: 4 (9/1/2022 11:12 AM)        Safety Assessment:  Current Safety Concerns:  [place] Suicide Severity Rating Scale (Short Version)  [place] Suicide Severity Rating (Short Version) 8/24/2022 8/24/2022 8/24/2022 8/24/2022 8/26/2022 8/26/2022 9/1/2022   Over the past 2 weeks have you felt down,  depressed, or hopeless? - - yes - yes - -   Over the past 2 weeks have you had thoughts of killing yourself? yes - yes - yes - -   Have you ever attempted to kill yourself? (No Data) - no - yes - -   Comments pt did not confirm or deny - - - - - -   When did this last happen? - - - - between 1 and 6 months ago - -   Q1 Wished to be Dead (Past Month) yes - yes (No Data) yes yes -   Comments - - - pt not answering questions - - -   Q2 Suicidal Thoughts (Past Month) yes - yes - yes yes -   Q3 Suicidal Thought Method yes - no - - yes -   Comments - - - - - incongruent  -   Q4 Suicidal Intent without Specific Plan yes - yes - yes yes -   Q5 Suicide Intent with Specific Plan no - no - - yes -   Comments - - - - - discusses having a plan and changes her mind frequently -   Q6 Suicide Behavior (Lifetime) - - no - - yes -   Within the Past 3 Months? - - - - - yes -   Level of Risk per Screen high risk - high risk - - high risk -   High Risk Required Interventions On continuous in person observation - On continuous in person observation Provider notified On continuous in person observation Provider notified -   Required Interventions Provider notified Room made safe;Belongings removed Provider notified Room searched;Room made safe;Patient searched;Belongings removed Provider notified;Room searched;Room made safe;Patient searched;Belongings removed Room searched;Room made safe;Patient searched;Belongings removed -   Interventions DEC consulted DEC consulted Monitored via video;DEC consulted Monitored via video;DEC consulted Monitored via video;DEC consulted Monitored via video -   1. Wish to be Dead (Since Last Contact) - - - - - - 0   2. Non-Specific Active Suicidal Thoughts (Since Last Contact) - - - - - - 0   Actual Attempt (Since Last Contact) - - - - - - 0   Has subject engaged in non-suicidal self-injurious behavior? (Since Last Contact) - - - - - - 0   Interrupted Attempts (Since Last Contact) - - - - - - 0   Aborted or  Self-Interrupted Attempt (Since Last Contact) - - - - - - 0   Preparatory Acts or Behavior (Since Last Contact) - - - - - - 0   Suicide (Since Last Contact) - - - - - - 0   Calculated C-SSRS Risk Score (Since Last Contact) - - - - - - No Risk Indicated     [place] Suicide Severity Rating Scale (Lifetime/Recent)  [place] Suicide Severity Rating (Lifetime/Recent) 8/24/2022 8/24/2022 8/24/2022 8/26/2022 8/26/2022 8/27/2022 9/1/2022   Wish to be Dead (Lifetime) - - - - - Yes -   Non-Specific Active Suicidal Thoughts (Lifetime) - - - - - No -   Q1 Wished to be Dead (Past Month) yes yes (No Data) yes yes - -   Q2 Suicidal Thoughts (Past Month) yes yes - yes yes - -   Q3 Suicidal Thought Method yes no - - yes - -   Q4 Suicidal Intent without Specific Plan yes yes - yes yes - -   Q5 Suicide Intent with Specific Plan no no - - yes - -   Q6 Suicide Behavior (Lifetime) - no - - yes - -   Within the Past 3 Months? - - - - yes - -   Level of Risk per Screen high risk high risk - - high risk - -   Suicidal Ideation(Most Severe Past Mnth) - - - - suicidal thoughts - -   Activating Events (Recent) - - - - recent loss(es) or other significant negative event(s) (legal, financial, relationship, etc.) - -   Treatment History - - - - previous psychiatric diagnoses and treatments - -   Clinical Status (Recent) - - - - hopelessness - -   1. Wish to be Dead (Past 1 Month) - - - - - - 1   2. Non-Specific Active Suicidal Thoughts (Past 1 Month) - - - - - - 1   3. Active Suicidal Ideation with any Methods (Not Plan) Without Intent to Act (Past 1 Month) - - - - - - 1   4. Active Suicidal Ideation with Some Intent to Act, Without Specific Plan (Past 1 Month) - - - - - - 0   5. Active Suicidal Ideation with Specific Plan and Intent (Past 1 Month) - - - - - - 0   Calculated C-SSRS Risk Score (Lifetime/Recent) - - - - - - Moderate Risk       Patient reports the following protective factors: dedication to family/friends, safe and stable  environment, living with other people, daily obligations, committment to well-being and sense of personal control or determination    See Preliminary Treatment Plan for Safety and Risk Management Plan      Review of Symptoms per patient report:  Depression:     Change in sleep, Lack of interest, Excessive or inappropriate guilt, Change in energy level, Difficulties concentrating, Change in appetite, Suicidal ideation, Feelings of hopelessness, Feelings of helplessness, Low self-worth, Ruminations, Feeling sad, down, or depressed and Withdrawn  Nasima:             No Symptoms  Psychosis:       Delusions  Anxiety:           Excessive worry, Nervousness, Sleep disturbance, Ruminations and Poor concentration  Panic:              No symptoms  Post Traumatic Stress Disorder:  Avoids traumatic stimuli, Hypervigilance, Increased arousal, Impaired functioning and Dissociation   Eating Disorder:          No Symptoms  ADD / ADHD:              No symptoms  Conduct Disorder:       No symptoms  Autism Spectrum Disorder:     No symptoms  Obsessive Compulsive Disorder:       No Symptoms     Patient reports the following compulsive behaviors and treatment history: Patient denies.      Diagnostic Criteria:   Generalized Anxiety Disorder  A. Excessive anxiety and worry about a number of events or activities (such as work or school performance).   B. The person finds it difficult to control the worry.  C. Select 3 or more symptoms (required for diagnosis). Only one item is required in children.   - Restlessness or feeling keyed up or on edge.    - Being easily fatigued.    - Difficulty concentrating or mind going blank.    - Irritability.    - Muscle tension.    - Sleep disturbance (difficulty falling or staying asleep, or restless unsatisfying sleep).   D. The focus of the anxiety and worry is not confined to features of an Axis I disorder.  E. The anxiety, worry, or physical symptoms cause clinically significant distress or impairment  in social, occupational, or other important areas of functioning.   F. The disturbance is not due to the direct physiological effects of a substance (e.g., a drug of abuse, a medication) or a general medical condition (e.g., hyperthyroidism) and does not occur exclusively during a Mood Disorder, a Psychotic Disorder, or a Pervasive Developmental Disorder. Major Depressive Disorder  CRITERIA (A-C) REPRESENT A MAJOR DEPRESSIVE EPISODE - SELECT THESE CRITERIA  A) Recurrent episode(s) - symptoms have been present during the same 2-week period and represent a change from previous functioning 5 or more symptoms (required for diagnosis)   - Depressed mood. Note: In children and adolescents, can be irritable mood.     - Diminished interest or pleasure in all, or almost all, activities.    - Fatigue or loss of energy.    - Feelings of worthlessness or inappropriate guilt.    - Diminished ability to think or concentrate, or indecisiveness.   B) The symptoms cause clinically significant distress or impairment in social, occupational, or other important areas of functioning  C) The episode is not attributable to the physiological effects of a substance or to another medical condition  D) The occurence of major depressive episode is not better explained by other thought / psychotic disorders  E) There has never been a manic episode or hypomanic episode Substance Use Disorder Substance is often taken in larger amounts or over a longer period than was intended.  Met for:  Alcohol There is persistent desire or unsuccessful efforts to cut down or control use of the substance.  Met for:  Alcohol Continued use of the substance despite having persistent or recurrent social or interpersonal problems caused or exacerbated by the effects of its use.  Met for:  Alcohol Use of the substance is continued despite knowledge of having a persistent or recurrent physical or psychological problem that is likely to have been cause or exacerbated by  the substance.  Met for:  Alcohol      Functional Status:  Patient reports the following functional impairments: health maintenance, relationship(s) and self-care.     WHODAS:   WHODAS 2.0 Total Score 9/1/2022   Total Score 26   Total Score MyChart 26     Nonprogrammatic care:  Patient is requesting basic services to address current mental health concerns.      Clinical Summary:  1. Reason for assessment: to determine level of care  .  2. Psychosocial, Cultural and Contextual Factors: Sis  .  3. Principal DSM5 Diagnoses  (Sustained by DSM5 Criteria Listed Above):   296.34 (F33.3) Major Depressive Disorder, Recurrent Episode, With psychotic features _ and With anxious distress.  4. 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 Provisional Diagnosis:  Further diagnosis clarification will be beneficial.  6. Prognosis: Expect Improvement.  7. Likely consequences of symptoms if not treated: higher level of care.  8. Patient's strengths/skills/abilities include:  open to learning, open to suggestions / feedback, responsible parent, support of family, friends and providers, supportive, wants to learn, willing to ask questions, willing to relate to others and work history .   9. Patient's resources for support: Current mental health providers and spouse.    Recommendations:     1. Plan for Safety and Risk Management:   Recommended that patient call 911 or go to the local ED should there be a change in any of these risk factors.  Patient to follow safety plan developed on 8/24/22.          Report to child / adult protection services was NA.     2. Patient's identified sis / Gnosticist / spiritual influences will be incorporated into care by request by patient.     3. Initial Treatment will focus on:    Depressed Mood - .  Anxiety - .  Alcohol / Substance Use -  ..     4. Resources/Service Plan:    services are not indicated.   Modifications to assist communication are not indicated.   Additional disability accommodations are not indicated.      5. Collaboration:   Collaboration / coordination of treatment will be initiated with the following  support professionals: Behavioral Health Clinician ([facility]), primary care physician and psychiatry.      6.  Referrals:   The following referral(s) will be initiated (list in order of priority or patient preference): Outpatient [facility]. Next Scheduled Appointment: Referrals have already been placed by [facility].     A Release of Information has been obtained for the following: Emergency Contact.    7.  ANTHONY:  Discussed Discussed the general effects of drugs and alcohol on health and well-being, Discussed the impact of drugs and alcohol when used during pregnancy and Attend a sober community support program including AA, [facility], [facility], etc.).  Recommendations:  To enter and participate in a Dual Diagnosis Outpatient Program .  discussed referral for programmatic as an option to address substance use concerns with patient reporting that a structured schedule may not be beneficial.   discussed sober support meetings and how to participate virtually if needed with patient stating they would consider attending and declined need for list of recovery meetings.   discussed working with a therapist individual with a substance use disorder background and confirmed referral placed from assessment on 8/31/22.  Patient agreeable to working with a provider individual and will continue to meet with mental health providers as scheduled.   discussed the importance of abstaining from substance use when taking mental health medications.    8. Records:   They were reviewed at time of assessment.   Information in this assessment was obtained from the medical record and provided by patient and  family who is a good historian.    Patient will have open access to their mental health medical record.

## 2022-09-02 ENCOUNTER — TELEPHONE (OUTPATIENT)
Dept: BEHAVIORAL HEALTH | Facility: CLINIC | Age: 52
End: 2022-09-02

## 2022-09-02 NOTE — TELEPHONE ENCOUNTER
Pt displayed on hospital discharge report for [facility] hospital follow-up call review. Per chart review, post-hospital follow-up/outreach call was completed by [facility] on 8/29/22.        KATARINA GrayN, RN

## 2022-09-02 NOTE — TELEPHONE ENCOUNTER
Pt was on the daily BPA report.  Pt had an appt scheduled for a Behavioral Health Intake 9/1/22 that should have been cancelled.  Pt was seen on 8/31/22 and had an assessment completed by a [facility].    [facility] plans to reach back out to pt today and discuss programming that will likely began next week.      STEPHANI Bolden on 9/2/2022 at 8:51 AM

## 2022-09-07 ENCOUNTER — VIRTUAL VISIT (OUTPATIENT)
Dept: BEHAVIORAL HEALTH | Facility: CLINIC | Age: 52
End: 2022-09-07
Payer: COMMERCIAL

## 2022-09-07 DIAGNOSIS — F10.20 ALCOHOL USE DISORDER, SEVERE, IN CONTROLLED ENVIRONMENT, DEPENDENCE (H): ICD-10-CM

## 2022-09-07 DIAGNOSIS — F43.10 POSTTRAUMATIC STRESS DISORDER: ICD-10-CM

## 2022-09-07 DIAGNOSIS — F41.1 GENERALIZED ANXIETY DISORDER: ICD-10-CM

## 2022-09-07 DIAGNOSIS — F32.3 MAJOR DEPRESSIVE DISORDER, SINGLE EPISODE, SEVERE WITH PSYCHOTIC FEATURES (H): Primary | ICD-10-CM

## 2022-09-07 PROCEDURE — 90832 PSYTX W PT 30 MINUTES: CPT | Mod: 95
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
## 2022-09-13 ENCOUNTER — TELEPHONE (OUTPATIENT)
Dept: FAMILY MEDICINE | Facility: CLINIC | Age: 52
End: 2022-09-13

## 2022-09-13 NOTE — TELEPHONE ENCOUNTER
Patient Quality Outreach    Patient is due for the following:   Breast Cancer Screening - Mammogram    Next Steps:   No follow up needed at this time.    Type of outreach:    Sent [facility] message.      Questions for provider review:    None     Ad Madison MA

## 2022-09-21 ENCOUNTER — VIRTUAL VISIT (OUTPATIENT)
Dept: BEHAVIORAL HEALTH | Facility: CLINIC | Age: 52
End: 2022-09-21
Payer: COMMERCIAL

## 2022-09-21 DIAGNOSIS — F41.1 GENERALIZED ANXIETY DISORDER: ICD-10-CM

## 2022-09-21 DIAGNOSIS — F10.20 ALCOHOL USE DISORDER, SEVERE, IN CONTROLLED ENVIRONMENT, DEPENDENCE (H): ICD-10-CM

## 2022-09-21 DIAGNOSIS — F32.3 MAJOR DEPRESSIVE DISORDER, SINGLE EPISODE, SEVERE WITH PSYCHOTIC FEATURES (H): Primary | ICD-10-CM

## 2022-09-21 DIAGNOSIS — F43.10 POSTTRAUMATIC STRESS DISORDER: ICD-10-CM

## 2022-09-21 PROCEDURE — 90832 PSYTX W PT 30 MINUTES: CPT | Mod: 95
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
## 2022-10-09 ENCOUNTER — HEALTH MAINTENANCE LETTER (OUTPATIENT)
Age: 52
End: 2022-10-09

## 2022-11-21 ENCOUNTER — VIRTUAL VISIT (OUTPATIENT)
Dept: PSYCHOLOGY | Facility: CLINIC | Age: 52
End: 2022-11-21
Payer: COMMERCIAL

## 2022-11-21 DIAGNOSIS — F43.10 POST TRAUMATIC STRESS DISORDER: Primary | ICD-10-CM

## 2022-11-21 DIAGNOSIS — F10.20 ALCOHOL USE DISORDER, SEVERE, DEPENDENCE (H): ICD-10-CM

## 2022-11-21 PROCEDURE — 90837 PSYTX W PT 60 MINUTES: CPT | Mod: 95 | Performed by: SOCIAL WORKER

## 2022-12-05 ENCOUNTER — VIRTUAL VISIT (OUTPATIENT)
Dept: PSYCHOLOGY | Facility: CLINIC | Age: 52
End: 2022-12-05
Payer: COMMERCIAL

## 2022-12-05 DIAGNOSIS — F43.10 POST TRAUMATIC STRESS DISORDER: Primary | ICD-10-CM

## 2022-12-05 DIAGNOSIS — F10.20 ALCOHOL USE DISORDER, SEVERE, DEPENDENCE (H): ICD-10-CM

## 2022-12-05 PROCEDURE — 90834 PSYTX W PT 45 MINUTES: CPT | Mod: 95 | Performed by: SOCIAL WORKER

## 2022-12-05 ASSESSMENT — ANXIETY QUESTIONNAIRES
GAD7 TOTAL SCORE: 8
6. BECOMING EASILY ANNOYED OR IRRITABLE: NOT AT ALL
IF YOU CHECKED OFF ANY PROBLEMS ON THIS QUESTIONNAIRE, HOW DIFFICULT HAVE THESE PROBLEMS MADE IT FOR YOU TO DO YOUR WORK, TAKE CARE OF THINGS AT HOME, OR GET ALONG WITH OTHER PEOPLE: SOMEWHAT DIFFICULT
1. FEELING NERVOUS, ANXIOUS, OR ON EDGE: NOT AT ALL
2. NOT BEING ABLE TO STOP OR CONTROL WORRYING: SEVERAL DAYS
3. WORRYING TOO MUCH ABOUT DIFFERENT THINGS: NOT AT ALL
7. FEELING AFRAID AS IF SOMETHING AWFUL MIGHT HAPPEN: MORE THAN HALF THE DAYS
GAD7 TOTAL SCORE: 8
5. BEING SO RESTLESS THAT IT IS HARD TO SIT STILL: MORE THAN HALF THE DAYS

## 2022-12-05 ASSESSMENT — PATIENT HEALTH QUESTIONNAIRE - PHQ9: 5. POOR APPETITE OR OVEREATING: NEARLY EVERY DAY
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
## 2023-02-27 ENCOUNTER — VIRTUAL VISIT (OUTPATIENT)
Dept: PSYCHOLOGY | Facility: CLINIC | Age: 53
End: 2023-02-27
Payer: COMMERCIAL

## 2023-02-27 DIAGNOSIS — F43.10 POST TRAUMATIC STRESS DISORDER: Primary | ICD-10-CM

## 2023-02-27 DIAGNOSIS — F10.20 ALCOHOL USE DISORDER, SEVERE, DEPENDENCE (H): ICD-10-CM

## 2023-02-27 PROCEDURE — 90834 PSYTX W PT 45 MINUTES: CPT | Mod: 95 | Performed by: SOCIAL WORKER

## 2023-02-27 PROCEDURE — 90785 PSYTX COMPLEX INTERACTIVE: CPT | Mod: 95 | Performed by: SOCIAL WORKER

## 2023-02-27 ASSESSMENT — ANXIETY QUESTIONNAIRES
6. BECOMING EASILY ANNOYED OR IRRITABLE: NOT AT ALL
5. BEING SO RESTLESS THAT IT IS HARD TO SIT STILL: NEARLY EVERY DAY
7. FEELING AFRAID AS IF SOMETHING AWFUL MIGHT HAPPEN: NOT AT ALL
2. NOT BEING ABLE TO STOP OR CONTROL WORRYING: NOT AT ALL
1. FEELING NERVOUS, ANXIOUS, OR ON EDGE: SEVERAL DAYS
IF YOU CHECKED OFF ANY PROBLEMS ON THIS QUESTIONNAIRE, HOW DIFFICULT HAVE THESE PROBLEMS MADE IT FOR YOU TO DO YOUR WORK, TAKE CARE OF THINGS AT HOME, OR GET ALONG WITH OTHER PEOPLE: NOT DIFFICULT AT ALL
GAD7 TOTAL SCORE: 4
3. WORRYING TOO MUCH ABOUT DIFFERENT THINGS: NOT AT ALL
GAD7 TOTAL SCORE: 4

## 2023-02-27 ASSESSMENT — PATIENT HEALTH QUESTIONNAIRE - PHQ9
SUM OF ALL RESPONSES TO PHQ QUESTIONS 1-9: 4
5. POOR APPETITE OR OVEREATING: NOT AT ALL
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
## 2023-03-25 ENCOUNTER — HEALTH MAINTENANCE LETTER (OUTPATIENT)
Age: 53
End: 2023-03-25

## 2023-03-27 ENCOUNTER — VIRTUAL VISIT (OUTPATIENT)
Dept: PSYCHOLOGY | Facility: CLINIC | Age: 53
End: 2023-03-27
Payer: COMMERCIAL

## 2023-03-27 DIAGNOSIS — F43.23 ADJUSTMENT REACTION WITH ANXIETY AND DEPRESSION: ICD-10-CM

## 2023-03-27 DIAGNOSIS — F10.20 ALCOHOL USE DISORDER, SEVERE, DEPENDENCE (H): Primary | ICD-10-CM

## 2023-03-27 PROCEDURE — 90834 PSYTX W PT 45 MINUTES: CPT | Mod: VID | Performed by: SOCIAL WORKER

## 2023-03-27 PROCEDURE — 90785 PSYTX COMPLEX INTERACTIVE: CPT | Mod: VID | Performed by: SOCIAL WORKER

## 2023-03-27 ASSESSMENT — PATIENT HEALTH QUESTIONNAIRE - PHQ9
5. POOR APPETITE OR OVEREATING: NOT AT ALL
SUM OF ALL RESPONSES TO PHQ QUESTIONS 1-9: 1

## 2023-03-27 ASSESSMENT — ANXIETY QUESTIONNAIRES
1. FEELING NERVOUS, ANXIOUS, OR ON EDGE: NOT AT ALL
2. NOT BEING ABLE TO STOP OR CONTROL WORRYING: NOT AT ALL
5. BEING SO RESTLESS THAT IT IS HARD TO SIT STILL: SEVERAL DAYS
7. FEELING AFRAID AS IF SOMETHING AWFUL MIGHT HAPPEN: NOT AT ALL
GAD7 TOTAL SCORE: 1
GAD7 TOTAL SCORE: 1
3. WORRYING TOO MUCH ABOUT DIFFERENT THINGS: NOT AT ALL
6. BECOMING EASILY ANNOYED OR IRRITABLE: NOT AT ALL
IF YOU CHECKED OFF ANY PROBLEMS ON THIS QUESTIONNAIRE, HOW DIFFICULT HAVE THESE PROBLEMS MADE IT FOR YOU TO DO YOUR WORK, TAKE CARE OF THINGS AT HOME, OR GET ALONG WITH OTHER PEOPLE: NOT DIFFICULT AT ALL

## 2023-03-27 NOTE — PROGRESS NOTES
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 safe place?     Imagine a protective bubble, wall or boundary around your safe place.     Imagine a door or gate with a guard at your safe place.     Image a lock and key to your safe place and only you can unlock it.    You can draw or make a collage that represents your safe place.     Choose a souvenir of your safe place -- a color, an object, a song.     Keep your image of your safe place so you can come back to it when you need to.  Things that I am able to do with others to cope or help me feel better: reach out to therapist and other mental health providers, reach out to family members and family friends, identify what makes life worth living; distracting strategies, reach out to crisis lines  Things I can use or do for distraction:   Sing in the shower; take a long walk; count your blessings; read a poem; think about your pet; take a deep breath; let out a big sigh; Buy yourself flowers; Jump rope; Make a  to do  list; Count to 10; Keep a diary; Journal daily; Plant flowers; Do a good deed; Set flexible deadlines; Call a friend; Listen hard; Believe in yourself; Read a good book; See a live play; Write to a friend; Forgive and forget; Eat by candlelight; Go to a ball game; Take a bubble bath; End a bad habit; Listen to music; Share what you have; Don t drink and drive; Be in the moment;  "Set realistic goals ; Eat right ; Hug a friend; Say something nice; Whistle a tune; Stretch a lot; Watch your weight; Walk instead of drive; Sleep late on weekends; Read the comics; Focus on your task; Don t take drugs; Make a gratitude list; Laugh a lot; Be kind to others; Cry when you can; Praise others; Play with your sibling; Set priorities; Reward yourself often; Massage tense muscles; Do the hard tasks first; Take a long bike ride; Smell a flower; Take a personal day; Draw a picture; Avoid negative people; Dance by yourself; Memorize a new song; Start a new hobby; Ask for a hug ; Think positive thoughts; Do volunteer work; Go to bed an hour early; Talk less; Don t procrastinate; Avoid the small stuff; Don t eat junk food; Start a support group; Call your grandparents; Meditate each day; Remember your successes; Get medical checkups; Turn off the noise; Clean up the clutter; Find the silver lining; Let others be themselves; Walk in the rain; Donate to [place]; Exercise 20 minutes a day; Start school work early; Set daily goals for yourself; Visualize a peaceful scene; Schedule play time each day; Give the benefit of the doubt  ; See problems as opportunities; Budget your time and money; Make a duplicate set of keys; Break big jobs into small tasks; Teach someone something new; Emphasize your strengths ; Admit you don t know it all; Hum your favorite tune; Take care of your own needs; Remember feelings are not facts; Remember:  this too shall pass;\" Drink a glass of water before bedtime; Change your route to work/school; Develop alternative plans; Avoid seeing, listening to; Reading bad news  Changes I can make to support my mental health and wellness: attend all scheduled mental health appointments; get enough/good sleep; take medications as prescribed; eat a healthy diet; get enough exercise; identify consistent relaxation strategies; develop a routine  Step 3: People and social settings that provide " "distraction:              Name:  Rodney                                                                Phone: lives at home              Name: Amada Hodges, Mary Carmen Day (friends)              Phone: (lives in [place])               Name: Any family                                                        Phone: various  ? movie theater and go to the park, [employer] theater, pickleball, work             Step 4: Remind myself of people and things that are important to me and worth living for: Rodney; sons (Suhail and Carroll); both sides of the family, Mary Carmen, other networks of people  Step 5: When I am in crisis, I can ask these people to help me use my safety plan:               Name: Nolan (brother)                         Phone: [phone]              Name: Rodney (spouse)             Phone: [phone]              Name:  Leticia (mother-in-law)            Phone: [phone]  outpatient treatment team; family; friends; crisis lines  Step 6: Making the environment safe:   ? secure medications: continue to assess for safety needs, remove access to firearms:   and be around others  Step 7: Professionals or agencies I can contact during a crisis: local ED, [facility], crisis lines     Crisis Lines     Your [facility] has a mental health crisis team you can call 24/7: [facility] Mobile Crisis Response Team (CRT) [phone] or [phone]     Crisis Text Line  Text 286025  You will be connected with a trained live crisis counselor to provide support.  Por espanol, texto  TOMÁS a 983693 o texto a 442-AYUDAME en WhatsApp  National Hope Line  1.800.SUICIDE [6207798]  Crisis Intervention: 361.319.7113 or 429-584-9654 (TTY: 152.279.9097).  Call anytime for help.  Suicide Awareness Voices of Education (SAVE) (www.save.org): 110-821-SAVE (2908)  Text 4 Life: txt \"LIFE\" to 85928 for immediate support and crisis intervention     New national suicide and crisis line is now live -- just dial 988     If you " "or someone you know is struggling or in crisis, help is available. Call or text 988 or chat BridgeLux.org (copy and paste into your browser).     About 988  988 offers 24/7 access to trained crisis counselors who can help people experiencing mental health-related distress. That could be:    Thoughts of suicide    Mental health or substance use crisis, or    Any other kind of emotion distress     People can call or text 98Geotender or chat TradeSyncorg for themselves OR if they are worried about a loved one who may need crisis support.     988 serves as a universal entry point so that no matter where you live in the [place], you can reach a trained crisis counselor who can help.     How is 988 different from 911?  988 was established to improve access to crisis services in a way that meets our country s growing suicide and mental health related crisis care needs. 988 will provide easier access to the Lifeline network and related crisis resources, which are distinct from 911 (where the focus is on dispatching Emergency Medical Services, fire and police as needed).      Call 911 or go to my nearest emergency department.          If I am feeling unsafe or I am in a crisis, I will:   Contact my established care providers   Call the National Suicide Prevention Lifeline: 249.103.7035   Go to the nearest emergency room   Call 911      Community Resources     Fast Tracker  Linking people to mental health and substance use disorder resources  fasttrackermn.org   [place] Mental Health Warm Line  Peer to peer support  Monday thru Saturday, 12 pm to 10 pm  260.123.6972 or 5.471.762.5904  Text \"Support\" to 92401  National [place] on Mental Illness (RUCHI)  843.401.6597 or 1.888.RUCHI.HELPS     Mental Health Apps     My3  https://my3app.org/  VirtualHopeBox  https://[employer].org/apps/virtual-hope-box/     I helped develop this safety plan and agree to use it when needed.  I have been given a copy of this plan. "       Patient signature: _______________________________________________________________     Today s date:  2022     Adapted from Safety Plan Template 2008 Nicolasa Peter and Jose Griffith is reprinted with the express permission of the authors.  No portion of the Safety Plan Template may be reproduced without the express, written permission.  You can contact the authors at bhs@[email or web address] or taylor@mail.[facility].     STEPHANI Lewis, [facility]  Behavioral Health Clinician  [facility]  [address] , [place] [address]  [phone]

## 2023-08-11 ENCOUNTER — TELEPHONE (OUTPATIENT)
Dept: ALLERGY | Facility: CLINIC | Age: 53
End: 2023-08-11
Payer: COMMERCIAL

## 2023-08-11 NOTE — TELEPHONE ENCOUNTER
fluticasone-salmeterol (AIRDUO RESPICLICK) 232-14 MCG/ACT inhaler       Prior Authorization Retail Medication Request    Medication/Dose: fluticasone-salmeterol (AIRDUO RESPICLICK) 232-14 MCG/ACT inhaler  ICD code (if different than what is on RX):    Previously Tried and Failed:    Rationale:      Insurance Name:    Insurance ID:        Pharmacy Information (if different than what is on RX)  Name:    Phone:

## 2023-08-15 NOTE — TELEPHONE ENCOUNTER
PA Initiation    Medication: FLUTICASONE-SALMETEROL 232-14 MCG/ACT IN AEPB  Insurance Company: [employer] Non-Specialty PA's - Phone [phone] Fax [phone]  Pharmacy Filling the Rx: [facility] [place], [place] - [address] [facility]  Filling Pharmacy Phone: [phone]  Filling Pharmacy Fax: [phone]  Start Date: 8/15/2023

## 2023-08-17 NOTE — TELEPHONE ENCOUNTER
PRIOR AUTHORIZATION DENIED    Medication: FLUTICASONE-SALMETEROL 232-14 MCG/ACT IN AEPB  Insurance Company: [employer] Non-Specialty PA's - Phone [phone] Fax [phone]  Denial Date: 8/16/2023  Denial Rational:     Appeal Information:     Patient Notified: No

## 2023-08-28 NOTE — TELEPHONE ENCOUNTER
I have not seen the patient for more than 1 year.  When I saw her in April 2022, I recommended a 6-month follow-up.  The patient needs a follow-up appointment to discuss further options.  Meanwhile, if AirDuo was effective, she can try to use good Rx code to get it for a cheaper her.    Clifton Trejo MD

## 2023-08-31 NOTE — TELEPHONE ENCOUNTER
Notified patient, scheduled follow up appt.    Jackie Pritchard MSN, RN   [facility], 8/31/2023 1:47 PM

## 2023-10-10 ENCOUNTER — OFFICE VISIT (OUTPATIENT)
Dept: ALLERGY | Facility: OTHER | Age: 53
End: 2023-10-10
Payer: COMMERCIAL

## 2023-10-10 VITALS
WEIGHT: 191.5 LBS | BODY MASS INDEX: 29.12 KG/M2 | DIASTOLIC BLOOD PRESSURE: 73 MMHG | SYSTOLIC BLOOD PRESSURE: 113 MMHG | OXYGEN SATURATION: 100 % | HEART RATE: 72 BPM

## 2023-10-10 DIAGNOSIS — J30.1 SEASONAL ALLERGIC RHINITIS DUE TO POLLEN: ICD-10-CM

## 2023-10-10 DIAGNOSIS — R05.8 OTHER COUGH: ICD-10-CM

## 2023-10-10 DIAGNOSIS — J30.81 ALLERGIC RHINITIS DUE TO ANIMAL DANDER: ICD-10-CM

## 2023-10-10 DIAGNOSIS — Z23 NEED FOR PROPHYLACTIC VACCINATION AND INOCULATION AGAINST INFLUENZA: ICD-10-CM

## 2023-10-10 DIAGNOSIS — J30.89 ALLERGIC RHINITIS DUE TO DUST MITE: ICD-10-CM

## 2023-10-10 DIAGNOSIS — J45.40 MODERATE PERSISTENT ASTHMA WITHOUT COMPLICATION: Primary | ICD-10-CM

## 2023-10-10 LAB
FEF 25/75: NORMAL
FEV-1: NORMAL
FEV1/FVC: NORMAL
FVC: NORMAL

## 2023-10-10 PROCEDURE — 99214 OFFICE O/P EST MOD 30 MIN: CPT | Mod: 25 | Performed by: ALLERGY & IMMUNOLOGY

## 2023-10-10 PROCEDURE — 90682 RIV4 VACC RECOMBINANT DNA IM: CPT | Performed by: ALLERGY & IMMUNOLOGY

## 2023-10-10 PROCEDURE — 90471 IMMUNIZATION ADMIN: CPT | Performed by: ALLERGY & IMMUNOLOGY

## 2023-10-10 PROCEDURE — 94010 BREATHING CAPACITY TEST: CPT | Performed by: ALLERGY & IMMUNOLOGY

## 2023-10-10 RX ORDER — ALBUTEROL SULFATE 90 UG/1
2 AEROSOL, METERED RESPIRATORY (INHALATION) EVERY 4 HOURS PRN
Qty: 18 G | Refills: 5 | Status: SHIPPED | OUTPATIENT
Start: 2023-10-10

## 2023-10-10 RX ORDER — FLUTICASONE PROPIONATE AND SALMETEROL 232; 14 UG/1; UG/1
1 POWDER, METERED RESPIRATORY (INHALATION) 2 TIMES DAILY
Qty: 1 EACH | Refills: 11 | Status: SHIPPED | OUTPATIENT
Start: 2023-10-10 | End: 2024-03-12

## 2023-10-10 RX ORDER — AZELASTINE 1 MG/ML
2 SPRAY, METERED NASAL 2 TIMES DAILY PRN
Qty: 30 ML | Refills: 3 | Status: SHIPPED | OUTPATIENT
Start: 2023-10-10

## 2023-10-10 ASSESSMENT — ENCOUNTER SYMPTOMS
EYE REDNESS: 0
COUGH: 1
SORE THROAT: 0
EYE ITCHING: 0
DIARRHEA: 0
EYE DISCHARGE: 0
NAUSEA: 0
WHEEZING: 0
ABDOMINAL PAIN: 0
JOINT SWELLING: 0
CONSTIPATION: 0
CHEST TIGHTNESS: 0
FEVER: 0
SHORTNESS OF BREATH: 0
SINUS PRESSURE: 0
ACTIVITY CHANGE: 0
FATIGUE: 0
RHINORRHEA: 0
VOMITING: 0
CHILLS: 0

## 2023-10-10 ASSESSMENT — ASTHMA QUESTIONNAIRES: ACT_TOTALSCORE: 22

## 2023-10-10 NOTE — LETTER
10/10/2023         RE: Neeta Verde  [address]  [place] [address]        Dear Colleague,    Thank you for referring your patient, Neeta Verde, to the [facility]. Please see a copy of my visit note below.    SUBJECTIVE:                                                                   Neeta Verde presents today to our Allergy Clinic at [facility] for a follow up visit. She is a 53 year old female with asthma.   I saw the patient once in April 2022.  I requested a follow-up in 6 months.  They have not seen the patient since then.  History of asthma manifested by chest tightness, shortness of breath, and cough triggered by cold air and exposure to pets.  She also has a history of cough due to the postnasal drainage.  Previously, she was on Advair and it was helpful.  When she saw Dr. Wyatt, her symptoms were not well controlled, and she was started on Brio Ellipta 200/25 mcg 1 puff once daily, which was significantly helpful.  Whenever she has chest symptoms, most of the time, albuterol is acutely helpful.  In 2014, she had an office spirometry done that did not suggest an obstruction.        History of itchy/watery eyes and postnasal drainage in Spring and Fall.  May develop nasal congestion with cat exposure.  SPT for aeroallergens performed in 2021, suggest sensitivity to cat, dog, dust mites, grass pollen (Bc), tree pollen, and weed pollen.        She uses AirDuo, but primarily once daily. Sometimes, she will use it twice daily. Most of the time, she wakes up coughing every day, but mainly due to postnasal drip. Neeta uses albuterol HFA less than twice weekly for rescue from chest symptoms. There has been no use of oral steroids since the last visit. No ED/PCP/urgent care/other specialist visits for asthma flare since the previous visit. The patient denies current chest tightness, cough, wheezing, or shortness of breath.     She takes  fexofenadine as needed. Doesn't use any nasal sprays. Besides morrning postnasal drip, she deveies nasal congestion or rhinorrhea. Has reflux about twice a month. No interval sinus infections since there last visit.       Patient Active Problem List   Diagnosis     Major depressive disorder, recurrent episode, moderate with mood-congruent psychotic features (H)     Psychosis, brief reactive (H)     CARDIOVASCULAR SCREENING; LDL GOAL LESS THAN 160     ASCUS of cervix with negative high risk HPV     Moderate persistent asthma without complication     Allergic rhinitis due to animal dander     Allergic rhinitis due to dust mite     Seasonal allergic rhinitis due to pollen     Suicidal ideation     Post traumatic stress disorder       Past Medical History:   Diagnosis Date     ASCUS favor benign 2015    Neg HPV     Depressive disorder      Moderate persistent asthma without complication      Uncomplicated asthma 20    Went to ER and followup with Allergy Doc      *Patient is Adopted           Problem (# of Occurrences) Relation (Name,Age of Onset)    Crohn's Disease (1) Son    Other - See Comments (1) Son: chron's    Allergy (Severe) (1) Son           Negative family history of: Unknown/Adopted, Asthma          Past Surgical History:   Procedure Laterality Date     [facility] APPENDECTOMY       Social History     Socioeconomic History     Marital status:      Spouse name: None     Number of children: None     Years of education: None     Highest education level: None   Occupational History     Comment: [employer]   Tobacco Use     Smoking status: Former     Packs/day: 0.00     Years: 2.00     Pack years: 0.00     Types: Cigarettes     Start date: 1987     Quit date: 1989     Years since quittin.7     Smokeless tobacco: Never     Tobacco comments:     Social Use only   Vaping Use     Vaping Use: Never used   Substance and Sexual Activity     Alcohol use: Yes     Alcohol/week: 14.0 standard drinks of  alcohol     Drug use: No     Sexual activity: Yes     Partners: Male     Birth control/protection: Post-menopausal   Other Topics Concern     Parent/sibling w/ CABG, MI or angioplasty before 65F 55M? No   Social History Narrative    10/10/23    ENVIRONMENTAL HISTORY: [place] The home is heated with a forced air and gas furnace. They do have central air conditioning. The patient's bedroom is furnished with carpeting in bedroom and fabric window coverings.  Pets inside the house include 1 dog(s). There is no history of cockroach or mice infestation. There is/are 0 smokers in the house.  The house does not have a damp basement.            Review of Systems   Constitutional:  Negative for activity change, chills, fatigue and fever.   HENT:  Negative for congestion, nosebleeds, postnasal drip, rhinorrhea, sinus pressure, sneezing and sore throat.    Eyes:  Negative for discharge, redness and itching.   Respiratory:  Positive for cough. Negative for chest tightness, shortness of breath and wheezing.    Cardiovascular:  Negative for chest pain.   Gastrointestinal:  Negative for abdominal pain, constipation, diarrhea, nausea and vomiting.   Musculoskeletal:  Negative for joint swelling.   Skin:  Negative for rash.   Allergic/Immunologic: Positive for environmental allergies.           Current Outpatient Medications:      albuterol (PROAIR HFA/PROVENTIL HFA/VENTOLIN HFA) 108 (90 Base) MCG/ACT inhaler, Inhale 2 puffs into the lungs every 4 hours as needed for wheezing, Disp: 18 g, Rfl: 5     azelastine (ASTELIN) 0.1 % nasal spray, Spray 2 sprays into both nostrils 2 times daily as needed for rhinitis, Disp: 30 mL, Rfl: 3     escitalopram (LEXAPRO) 20 MG tablet, Take 1 tablet (20 mg) by mouth daily, Disp: 90 tablet, Rfl: 3     fluticasone-salmeterol (AIRDUO RESPICLICK) 232-14 MCG/ACT inhaler, Inhale 1 puff into the lungs 2 times daily, Disp: 1 each, Rfl: 11     diazepam (VALIUM) 10 MG  tablet, Take 1 tablet (10 mg) by mouth 3 times daily as needed for withdrawal or anxiety, Disp: 15 tablet, Rfl: 0     fexofenadine (ALLEGRA) 180 MG tablet, Take 1 tablet (180 mg) by mouth daily, Disp: , Rfl:      QUEtiapine (SEROQUEL) 25 MG tablet, Take 1 tablet (25 mg) by mouth 3 times daily, Disp: 90 tablet, Rfl: 1  Immunization History   Administered Date(s) Administered     COVID-19 Monovalent 12+ (Pfizer 2022) 01/25/2022     COVID-19 Vaccine (Raheem) 05/14/2021     Influenza Vaccine 18-64 (Flublok) 10/10/2023     Influenza Vaccine >6 months (Alfuria,Fluzone) 09/22/2016, 09/20/2017, 09/10/2018     Mantoux Tuberculin Skin Test 02/12/2014     Pneumococcal 23 valent 04/13/2022     TDAP (Adacel,Boostrix) 01/25/2022     TDAP Vaccine (Adacel) 06/12/2009     Allergies   Allergen Reactions     Animal Dander      Azithromycin      Zithromax - Racing heart     Birch Trees      Dust Mites      Moxifloxacin Hydrochloride      Avelox - Labored breathing     Pollen Extract      OBJECTIVE:                                                                 /73 (BP Location: Left arm, Patient Position: Sitting, Cuff Size: Adult Regular)   Pulse 72   Wt 86.9 kg (191 lb 8 oz)   LMP 07/29/2015 (Exact Date)   SpO2 100%   BMI 29.12 kg/m          Physical Exam  Vitals and nursing note reviewed.   Constitutional:       General: She is not in acute distress.     Appearance: She is not ill-appearing, toxic-appearing or diaphoretic.   HENT:      Head: Normocephalic and atraumatic.      Right Ear: Tympanic membrane, ear canal and external ear normal.      Left Ear: Tympanic membrane, ear canal and external ear normal.      Nose: No congestion or rhinorrhea.      Right Turbinates: Not enlarged, swollen or pale.      Left Turbinates: Not enlarged, swollen or pale.      Mouth/Throat:      Lips: Pink.      Mouth: Mucous membranes are moist.      Pharynx: Oropharynx is clear. No pharyngeal swelling, oropharyngeal exudate, posterior  oropharyngeal erythema or uvula swelling.   Eyes:      General:         Right eye: No discharge.         Left eye: No discharge.      Conjunctiva/sclera: Conjunctivae normal.   Cardiovascular:      Rate and Rhythm: Normal rate and regular rhythm.      Heart sounds: Normal heart sounds.   Pulmonary:      Effort: Pulmonary effort is normal. No respiratory distress.      Breath sounds: Normal breath sounds and air entry. No stridor, decreased air movement or transmitted upper airway sounds. No decreased breath sounds, wheezing, rhonchi or rales.   Neurological:      Mental Status: She is alert and oriented to person, place, and time.   Psychiatric:         Mood and Affect: Mood normal.         Behavior: Behavior normal.             WORKUP: .  ACT 22  Spirometry was attempted but maneuvers were not reproducible.      ASSESSMENT/PLAN:    Moderate persistent asthma without complication    Symptoms are fairly well controlled.  At this point, it does not seem that the cough is asthma related.  On the other hand, the patient is using a suboptimal dose of AirDuo.  She did not follow-up in the past as suggested.  - Increase AirDuo 232/14 mcg1 puff twice daily mcg.  -Use albuterol inhaler 2-4 puffs every 4 hours as needed for chest tightness/wheezing/shortness of breath/persistent cough.  Unsuccessful office spirometry.  - Ordered pulmonary function test.    - BREATHING CAPACITY TEST [69413]  - fluticasone-salmeterol (AIRDUO RESPICLICK) 232-14 MCG/ACT inhaler  Dispense: 1 each; Refill: 11  - General PFT Lab (Please always keep checked)  - Pulmonary Function Test  - albuterol (PROAIR HFA/PROVENTIL HFA/VENTOLIN HFA) 108 (90 Base) MCG/ACT inhaler  Dispense: 18 g; Refill: 5    Other cough  Allergic rhinitis    Upper airway cough syndrome versus silent GERD versus suboptimally controlled asthma.  - The patient will be increasing the dose of AirDuo to 1 puff twice daily.  - I also suggest a trial of azelastine 2 sprays in each  nostril nightly, to see if this cough could be prevented with nasal spray.    - azelastine (ASTELIN) 0.1 % nasal spray  Dispense: 30 mL; Refill: 3      Need for prophylactic vaccination and inoculation against influenza    - INFLUENZA QUAD, RECOMBINANT, P-FREE (RIV4) (FLUBLOK)       Follow up in 3 months or sooner if needed.     Thank you for allowing us to participate in the care of this patient. Please feel free to contact us if there are any questions or concerns about the patient.    Disclaimer: This note consists of symbols derived from keyboarding, dictation and/or voice recognition software. As a result, there may be errors in the script that have gone undetected. Please consider this when interpreting information found in this chart.    Clifton Trejo MD, FAAAAI, FACAAI  Allergy, Asthma and Immunology     [facility]        Again, thank you for allowing me to participate in the care of your patient.        Sincerely,        Clifton Trejo MD

## 2023-10-10 NOTE — PROGRESS NOTES
SUBJECTIVE:                                                                   Neeta Verde presents today to our Allergy Clinic at [facility] for a follow up visit. She is a 53 year old female with asthma.   I saw the patient once in April 2022.  I requested a follow-up in 6 months.  They have not seen the patient since then.  History of asthma manifested by chest tightness, shortness of breath, and cough triggered by cold air and exposure to pets.  She also has a history of cough due to the postnasal drainage.  Previously, she was on Advair and it was helpful.  When she saw Dr. Wyatt, her symptoms were not well controlled, and she was started on Breo Ellipta 200/25 mcg 1 puff once daily, which was significantly helpful.  Whenever she has chest symptoms, most of the time, albuterol is acutely helpful.  In 2014, she had an office spirometry done that did not suggest an obstruction.        History of itchy/watery eyes and postnasal drainage in Spring and Fall.  May develop nasal congestion with cat exposure.  SPT for aeroallergens performed in 2021, suggest sensitivity to cat, dog, dust mites, grass pollen (Bc), tree pollen, and weed pollen.        She uses AirDuo, but primarily once daily. Sometimes, she will use it twice daily. Most of the time, she wakes up coughing every day, but mainly due to postnasal drip. Neeta uses albuterol HFA less than twice weekly for rescue from chest symptoms. There has been no use of oral steroids since the last visit. No ED/PCP/urgent care/other specialist visits for asthma flare since the previous visit. The patient denies current chest tightness, cough, wheezing, or shortness of breath.     She takes fexofenadine as needed. Doesn't use any nasal sprays. Besides morrning postnasal drip, she deveies nasal congestion or rhinorrhea. Has reflux about twice a month. No interval sinus infections since there last visit.       Patient Active Problem List    Diagnosis    Major depressive disorder, recurrent episode, moderate with mood-congruent psychotic features (H)    Psychosis, brief reactive (H)    CARDIOVASCULAR SCREENING; LDL GOAL LESS THAN 160    ASCUS of cervix with negative high risk HPV    Moderate persistent asthma without complication    Allergic rhinitis due to animal dander    Allergic rhinitis due to dust mite    Seasonal allergic rhinitis due to pollen    Suicidal ideation    Post traumatic stress disorder       Past Medical History:   Diagnosis Date    ASCUS favor benign 2015    Neg HPV    Depressive disorder     Moderate persistent asthma without complication     Uncomplicated asthma 20    Went to ER and followup with Allergy Doc      *Patient is Adopted           Problem (# of Occurrences) Relation (Name,Age of Onset)    Crohn's Disease (1) Son    Other - See Comments (1) Son: chron's    Allergy (Severe) (1) Son           Negative family history of: Unknown/Adopted, Asthma          Past Surgical History:   Procedure Laterality Date    ZC APPENDECTOMY       Social History     Socioeconomic History    Marital status:      Spouse name: None    Number of children: None    Years of education: None    Highest education level: None   Occupational History     Comment: [employer]   Tobacco Use    Smoking status: Former     Packs/day: 0.00     Years: 2.00     Pack years: 0.00     Types: Cigarettes     Start date: 1987     Quit date: 1989     Years since quittin.7    Smokeless tobacco: Never    Tobacco comments:     Social Use only   Vaping Use    Vaping Use: Never used   Substance and Sexual Activity    Alcohol use: Yes     Alcohol/week: 14.0 standard drinks of alcohol    Drug use: No    Sexual activity: Yes     Partners: Male     Birth control/protection: Post-menopausal   Other Topics Concern    Parent/sibling w/ CABG, MI or angioplasty before 65F 55M? No   Social History Narrative    10/10/23    ENVIRONMENTAL HISTORY: [place] The home is heated with a forced air and gas furnace. They do have central air conditioning. The patient's bedroom is furnished with carpeting in bedroom and fabric window coverings.  Pets inside the house include 1 dog(s). There is no history of cockroach or mice infestation. There is/are 0 smokers in the house.  The house does not have a damp basement.            Review of Systems   Constitutional:  Negative for activity change, chills, fatigue and fever.   HENT:  Negative for congestion, nosebleeds, postnasal drip, rhinorrhea, sinus pressure, sneezing and sore throat.    Eyes:  Negative for discharge, redness and itching.   Respiratory:  Positive for cough. Negative for chest tightness, shortness of breath and wheezing.    Cardiovascular:  Negative for chest pain.   Gastrointestinal:  Negative for abdominal pain, constipation, diarrhea, nausea and vomiting.   Musculoskeletal:  Negative for joint swelling.   Skin:  Negative for rash.   Allergic/Immunologic: Positive for environmental allergies.           Current Outpatient Medications:     albuterol (PROAIR HFA/PROVENTIL HFA/VENTOLIN HFA) 108 (90 Base) MCG/ACT inhaler, Inhale 2 puffs into the lungs every 4 hours as needed for wheezing, Disp: 18 g, Rfl: 5    azelastine (ASTELIN) 0.1 % nasal spray, Spray 2 sprays into both nostrils 2 times daily as needed for rhinitis, Disp: 30 mL, Rfl: 3    escitalopram (LEXAPRO) 20 MG tablet, Take 1 tablet (20 mg) by mouth daily, Disp: 90 tablet, Rfl: 3    fluticasone-salmeterol (AIRDUO RESPICLICK) 232-14 MCG/ACT inhaler, Inhale 1 puff into the lungs 2 times daily, Disp: 1 each, Rfl: 11    diazepam (VALIUM) 10 MG tablet, Take 1 tablet (10 mg) by mouth 3 times daily as needed for withdrawal or anxiety, Disp: 15 tablet, Rfl: 0    fexofenadine (ALLEGRA) 180 MG tablet, Take 1 tablet (180 mg) by mouth daily, Disp: , Rfl:     QUEtiapine (SEROQUEL) 25 MG tablet, Take 1 tablet (25 mg) by mouth 3  times daily, Disp: 90 tablet, Rfl: 1  Immunization History   Administered Date(s) Administered    COVID-19 Monovalent 12+ (Pfizer 2022) 01/25/2022    COVID-19 Vaccine (Raheem) 05/14/2021    Influenza Vaccine 18-64 (Flublok) 10/10/2023    Influenza Vaccine >6 months (Alfuria,Fluzone) 09/22/2016, 09/20/2017, 09/10/2018    Mantoux Tuberculin Skin Test 02/12/2014    Pneumococcal 23 valent 04/13/2022    TDAP (Adacel,Boostrix) 01/25/2022    TDAP Vaccine (Adacel) 06/12/2009     Allergies   Allergen Reactions    Animal Dander     Azithromycin      Zithromax - Racing heart    Birch Trees     Dust Mites     Moxifloxacin Hydrochloride      Avelox - Labored breathing    Pollen Extract      OBJECTIVE:                                                                 /73 (BP Location: Left arm, Patient Position: Sitting, Cuff Size: Adult Regular)   Pulse 72   Wt 86.9 kg (191 lb 8 oz)   LMP 07/29/2015 (Exact Date)   SpO2 100%   BMI 29.12 kg/m          Physical Exam  Vitals and nursing note reviewed.   Constitutional:       General: She is not in acute distress.     Appearance: She is not ill-appearing, toxic-appearing or diaphoretic.   HENT:      Head: Normocephalic and atraumatic.      Right Ear: Tympanic membrane, ear canal and external ear normal.      Left Ear: Tympanic membrane, ear canal and external ear normal.      Nose: No congestion or rhinorrhea.      Right Turbinates: Not enlarged, swollen or pale.      Left Turbinates: Not enlarged, swollen or pale.      Mouth/Throat:      Lips: Pink.      Mouth: Mucous membranes are moist.      Pharynx: Oropharynx is clear. No pharyngeal swelling, oropharyngeal exudate, posterior oropharyngeal erythema or uvula swelling.   Eyes:      General:         Right eye: No discharge.         Left eye: No discharge.      Conjunctiva/sclera: Conjunctivae normal.   Cardiovascular:      Rate and Rhythm: Normal rate and regular rhythm.      Heart sounds: Normal heart sounds.   Pulmonary:       Effort: Pulmonary effort is normal. No respiratory distress.      Breath sounds: Normal breath sounds and air entry. No stridor, decreased air movement or transmitted upper airway sounds. No decreased breath sounds, wheezing, rhonchi or rales.   Neurological:      Mental Status: She is alert and oriented to person, place, and time.   Psychiatric:         Mood and Affect: Mood normal.         Behavior: Behavior normal.             WORKUP: .  ACT 22  Spirometry was attempted but maneuvers were not reproducible.      ASSESSMENT/PLAN:    Moderate persistent asthma without complication    Symptoms are fairly well controlled.  At this point, it does not seem that the cough is asthma related.  On the other hand, the patient is using a suboptimal dose of AirDuo.  She did not follow-up in the past as suggested.  - Increase AirDuo 232/14 mcg1 puff twice daily mcg.  -Use albuterol inhaler 2-4 puffs every 4 hours as needed for chest tightness/wheezing/shortness of breath/persistent cough.  Unsuccessful office spirometry.  - Ordered pulmonary function test.    - BREATHING CAPACITY TEST [73580]  - fluticasone-salmeterol (AIRDUO RESPICLICK) 232-14 MCG/ACT inhaler  Dispense: 1 each; Refill: 11  - General PFT Lab (Please always keep checked)  - Pulmonary Function Test  - albuterol (PROAIR HFA/PROVENTIL HFA/VENTOLIN HFA) 108 (90 Base) MCG/ACT inhaler  Dispense: 18 g; Refill: 5    Other cough  Allergic rhinitis    Upper airway cough syndrome versus silent GERD versus suboptimally controlled asthma.  - The patient will be increasing the dose of AirDuo to 1 puff twice daily.  - I also suggest a trial of azelastine 2 sprays in each nostril nightly, to see if this cough could be prevented with nasal spray.    - azelastine (ASTELIN) 0.1 % nasal spray  Dispense: 30 mL; Refill: 3      Need for prophylactic vaccination and inoculation against influenza    - INFLUENZA QUAD, RECOMBINANT, P-FREE (RIV4) (FLUBLOK)       Follow up in 3 months  or sooner if needed.     Thank you for allowing us to participate in the care of this patient. Please feel free to contact us if there are any questions or concerns about the patient.    Disclaimer: This note consists of symbols derived from keyboarding, dictation and/or voice recognition software. As a result, there may be errors in the script that have gone undetected. Please consider this when interpreting information found in this chart.    Clifton Trejo MD, FAAAAI, FACAAI  Allergy, Asthma and Immunology     [facility]

## 2023-10-10 NOTE — PATIENT INSTRUCTIONS
Increase AirDuo to 1 puff twice daily.   -Use azelastine 2 sprays in each nostril twice a day when necessary. Try using it at bedtime, and see if that prevents morning cough.     Call to schedule breathing test    [place]: [phone]    Do not use albuterol on the day of the breathing test if possible.

## 2023-10-23 ENCOUNTER — OFFICE VISIT (OUTPATIENT)
Dept: NURSING | Facility: CLINIC | Age: 53
End: 2023-10-23
Attending: ALLERGY & IMMUNOLOGY
Payer: COMMERCIAL

## 2023-10-23 VITALS — OXYGEN SATURATION: 100 % | HEART RATE: 77 BPM | WEIGHT: 188.6 LBS | BODY MASS INDEX: 28.58 KG/M2 | HEIGHT: 68 IN

## 2023-10-23 DIAGNOSIS — J45.40 MODERATE PERSISTENT ASTHMA WITHOUT COMPLICATION: ICD-10-CM

## 2023-10-23 PROCEDURE — 94726 PLETHYSMOGRAPHY LUNG VOLUMES: CPT | Performed by: INTERNAL MEDICINE

## 2023-10-23 PROCEDURE — 94729 DIFFUSING CAPACITY: CPT | Performed by: INTERNAL MEDICINE

## 2023-10-23 PROCEDURE — 94060 EVALUATION OF WHEEZING: CPT | Performed by: INTERNAL MEDICINE

## 2023-10-24 LAB
DLCOUNC-%PRED-PRE: 131 %
DLCOUNC-PRE: 29.2 ML/MIN/MMHG
DLCOUNC-PRED: 22.2 ML/MIN/MMHG
ERV-%PRED-PRE: 77 %
ERV-PRE: 1.01 L
ERV-PRED: 1.29 L
EXPTIME-PRE: 5.76 SEC
FEF2575-%PRED-POST: 111 %
FEF2575-%PRED-PRE: 105 %
FEF2575-POST: 2.83 L/SEC
FEF2575-PRE: 2.69 L/SEC
FEF2575-PRED: 2.55 L/SEC
FEFMAX-%PRED-PRE: 82 %
FEFMAX-PRE: 5.93 L/SEC
FEFMAX-PRED: 7.14 L/SEC
FEV1-%PRED-PRE: 110 %
FEV1-PRE: 3.06 L
FEV1FEV6-PRE: 78 %
FEV1FEV6-PRED: 81 %
FEV1FVC-PRE: 77 %
FEV1FVC-PRED: 80 %
FEV1SVC-PRE: 78 %
FEV1SVC-PRED: 74 %
FIFMAX-PRE: 4.82 L/SEC
FRCPLETH-%PRED-PRE: 98 %
FRCPLETH-PRE: 2.89 L
FRCPLETH-PRED: 2.92 L
FVC-%PRED-PRE: 113 %
FVC-PRE: 3.96 L
FVC-PRED: 3.48 L
IC-%PRED-PRE: 113 %
IC-PRE: 2.93 L
IC-PRED: 2.59 L
RVPLETH-%PRED-PRE: 93 %
RVPLETH-PRE: 1.88 L
RVPLETH-PRED: 2.02 L
TLCPLETH-%PRED-PRE: 103 %
TLCPLETH-PRE: 5.82 L
TLCPLETH-PRED: 5.61 L
VA-%PRED-PRE: 106 %
VA-PRE: 5.78 L
VC-%PRED-PRE: 105 %
VC-PRE: 3.94 L
VC-PRED: 3.73 L

## 2023-10-26 NOTE — RESULT ENCOUNTER NOTE
[employer] message sent:   Spirometry does not suggest an obstruction.  - No changes in asthma treatment plan.

## 2023-10-28 ENCOUNTER — HEALTH MAINTENANCE LETTER (OUTPATIENT)
Age: 53
End: 2023-10-28

## 2024-01-30 DIAGNOSIS — Z79.899 HIGH RISK MEDICATION USE: Primary | ICD-10-CM

## 2024-02-02 ENCOUNTER — LAB (OUTPATIENT)
Dept: LAB | Facility: OTHER | Age: 54
End: 2024-02-02
Payer: COMMERCIAL

## 2024-02-02 DIAGNOSIS — Z79.899 HIGH RISK MEDICATION USE: ICD-10-CM

## 2024-02-02 LAB
ALBUMIN SERPL BCG-MCNC: 4.5 G/DL (ref 3.5–5.2)
ALP SERPL-CCNC: 70 U/L (ref 40–150)
ALT SERPL W P-5'-P-CCNC: 25 U/L (ref 0–50)
ANION GAP SERPL CALCULATED.3IONS-SCNC: 11 MMOL/L (ref 7–15)
AST SERPL W P-5'-P-CCNC: 27 U/L (ref 0–45)
BILIRUB SERPL-MCNC: 0.6 MG/DL
BUN SERPL-MCNC: 12.2 MG/DL (ref 6–20)
CALCIUM SERPL-MCNC: 9.3 MG/DL (ref 8.6–10)
CHLORIDE SERPL-SCNC: 103 MMOL/L (ref 98–107)
CHOLEST SERPL-MCNC: 238 MG/DL
CREAT SERPL-MCNC: 0.96 MG/DL (ref 0.51–0.95)
DEPRECATED HCO3 PLAS-SCNC: 28 MMOL/L (ref 22–29)
EGFRCR SERPLBLD CKD-EPI 2021: 70 ML/MIN/1.73M2
FASTING STATUS PATIENT QL REPORTED: YES
GLUCOSE SERPL-MCNC: 96 MG/DL (ref 70–99)
HDLC SERPL-MCNC: 79 MG/DL
LDLC SERPL CALC-MCNC: 142 MG/DL
NONHDLC SERPL-MCNC: 159 MG/DL
POTASSIUM SERPL-SCNC: 4.4 MMOL/L (ref 3.4–5.3)
PROT SERPL-MCNC: 7.2 G/DL (ref 6.4–8.3)
SODIUM SERPL-SCNC: 142 MMOL/L (ref 135–145)
TRIGL SERPL-MCNC: 86 MG/DL

## 2024-02-02 PROCEDURE — 80053 COMPREHEN METABOLIC PANEL: CPT

## 2024-02-02 PROCEDURE — 36415 COLL VENOUS BLD VENIPUNCTURE: CPT

## 2024-02-02 PROCEDURE — 80061 LIPID PANEL: CPT

## 2024-03-12 ENCOUNTER — OFFICE VISIT (OUTPATIENT)
Dept: ALLERGY | Facility: OTHER | Age: 54
End: 2024-03-12
Payer: COMMERCIAL

## 2024-03-12 VITALS
HEART RATE: 82 BPM | DIASTOLIC BLOOD PRESSURE: 80 MMHG | OXYGEN SATURATION: 97 % | WEIGHT: 188.71 LBS | BODY MASS INDEX: 28.69 KG/M2 | SYSTOLIC BLOOD PRESSURE: 131 MMHG

## 2024-03-12 DIAGNOSIS — J30.81 ALLERGIC RHINITIS DUE TO ANIMAL DANDER: Primary | ICD-10-CM

## 2024-03-12 DIAGNOSIS — J30.89 ALLERGIC RHINITIS DUE TO DUST MITE: ICD-10-CM

## 2024-03-12 DIAGNOSIS — J30.1 SEASONAL ALLERGIC RHINITIS DUE TO POLLEN: ICD-10-CM

## 2024-03-12 DIAGNOSIS — J45.40 MODERATE PERSISTENT ASTHMA WITHOUT COMPLICATION: ICD-10-CM

## 2024-03-12 PROCEDURE — 99214 OFFICE O/P EST MOD 30 MIN: CPT | Performed by: ALLERGY & IMMUNOLOGY

## 2024-03-12 RX ORDER — FLUTICASONE PROPIONATE AND SALMETEROL 232; 14 UG/1; UG/1
1 POWDER, METERED RESPIRATORY (INHALATION) 2 TIMES DAILY
Qty: 1 EACH | Refills: 11 | Status: SHIPPED | OUTPATIENT
Start: 2024-03-12

## 2024-03-12 ASSESSMENT — ASTHMA QUESTIONNAIRES
QUESTION_1 LAST FOUR WEEKS HOW MUCH OF THE TIME DID YOUR ASTHMA KEEP YOU FROM GETTING AS MUCH DONE AT WORK, SCHOOL OR AT HOME: NONE OF THE TIME
ACT_TOTALSCORE: 21
ACT_TOTALSCORE: 21
QUESTION_3 LAST FOUR WEEKS HOW OFTEN DID YOUR ASTHMA SYMPTOMS (WHEEZING, COUGHING, SHORTNESS OF BREATH, CHEST TIGHTNESS OR PAIN) WAKE YOU UP AT NIGHT OR EARLIER THAN USUAL IN THE MORNING: ONCE A WEEK
QUESTION_5 LAST FOUR WEEKS HOW WOULD YOU RATE YOUR ASTHMA CONTROL: WELL CONTROLLED
QUESTION_4 LAST FOUR WEEKS HOW OFTEN HAVE YOU USED YOUR RESCUE INHALER OR NEBULIZER MEDICATION (SUCH AS ALBUTEROL): NOT AT ALL
QUESTION_2 LAST FOUR WEEKS HOW OFTEN HAVE YOU HAD SHORTNESS OF BREATH: ONCE OR TWICE A WEEK

## 2024-03-12 NOTE — LETTER
3/12/2024         RE: Neeta Verde  [address]  [place] [address]        Dear Colleague,    Thank you for referring your patient, Neeta Verde, to the [facility]. Please see a copy of my visit note below.    SUBJECTIVE:                                                                   Neeta Verde presents today to our Allergy Clinic at [facility] for a follow up visit. She is a 54 year old female with asthma and allergic rhinoconjunctivitis.  History of asthma manifested by chest tightness, shortness of breath, and cough triggered by cold air and exposure to pets.  She also has a history of cough due to the postnasal drainage.  Pulmonary function test done in 2023 did not suggest an obstruction.  No postbronchodilator response was noted.    History of itchy/watery eyes and postnasal drainage in Spring and Fall.  May develop nasal congestion with cat exposure.  SPT for aeroallergens performed in 2021, suggest sensitivity to cat, dog, dust mites, grass pollen (Bc), tree pollen, and weed pollen.     Allergic rhinoconjunctivitis symptoms are well-controlled with fexofenadine as needed.  She has not needed nasal sprays for a while.   Neeta denies persistent clear rhinorrhea, nasal itch, stuffiness, or sneezing.   Asthma is well-controlled with AirDuo 232/14 mcg 1 puff once-twice daily depending on symptoms.Neeta uses albuterol HFA  less than twice weekly for rescue from chest symptoms. she wakes up less than twice per month due to chest symptoms. There has been no use of oral steroids since the last visit. No ED/PCP/urgent care/other specialist visits for asthma flare since the previous visit. The patient denies current chest tightness, cough, wheezing, or shortness of breath.   Asthma triggers remain unchanged. He uses a spacer/chamber device, where applicable.      Patient Active Problem List   Diagnosis     Major depressive disorder, recurrent  episode, moderate with mood-congruent psychotic features (H)     Psychosis, brief reactive (H)     CARDIOVASCULAR SCREENING; LDL GOAL LESS THAN 160     ASCUS of cervix with negative high risk HPV     Moderate persistent asthma without complication     Allergic rhinitis due to animal dander     Allergic rhinitis due to dust mite     Seasonal allergic rhinitis due to pollen     Suicidal ideation     Post traumatic stress disorder       Past Medical History:   Diagnosis Date     ASCUS favor benign 2015    Neg HPV     Depressive disorder      Moderate persistent asthma without complication      Uncomplicated asthma 20    Went to ER and followup with Allergy Doc      *Patient is Adopted           Problem (# of Occurrences) Relation (Name,Age of Onset)    Crohn's Disease (1) Son    Other - See Comments (1) Son: chron's    Allergy (Severe) (1) Son           Negative family history of: Unknown/Adopted, Asthma          Past Surgical History:   Procedure Laterality Date     ZC APPENDECTOMY       Social History     Socioeconomic History     Marital status:      Spouse name: None     Number of children: None     Years of education: None     Highest education level: None   Occupational History     Comment: [employer]   Tobacco Use     Smoking status: Former     Packs/day: 0.00     Years: 2.00     Additional pack years: 0.00     Total pack years: 0.00     Types: Cigarettes     Start date: 1987     Quit date: 1989     Years since quittin.2     Smokeless tobacco: Never     Tobacco comments:     Social Use only   Vaping Use     Vaping Use: Never used   Substance and Sexual Activity     Alcohol use: Yes     Alcohol/week: 14.0 standard drinks of alcohol     Drug use: No     Sexual activity: Yes     Partners: Male     Birth control/protection: Post-menopausal   Other Topics Concern     Parent/sibling w/ CABG, MI or angioplasty before 65F 55M? No   Social History Narrative    2024         ENVIRONMENTAL HISTORY: [place] The home is heated with a forced air and gas furnace. They do have central air conditioning. The patient's bedroom is furnished with carpeting in bedroom and fabric window coverings.  Pets inside the house include 1 dog(s). There is no history of cockroach or mice infestation. There is/are 0 smokers in the house.  The house does not have a damp basement.            Current Outpatient Medications:      albuterol (PROAIR HFA/PROVENTIL HFA/VENTOLIN HFA) 108 (90 Base) MCG/ACT inhaler, Inhale 2 puffs into the lungs every 4 hours as needed for wheezing, Disp: 18 g, Rfl: 5     escitalopram (LEXAPRO) 20 MG tablet, Take 1 tablet (20 mg) by mouth daily, Disp: 90 tablet, Rfl: 3     fluticasone-salmeterol (AIRDUO RESPICLICK) 232-14 MCG/ACT inhaler, Inhale 1 puff into the lungs 2 times daily, Disp: 1 each, Rfl: 11     azelastine (ASTELIN) 0.1 % nasal spray, Spray 2 sprays into both nostrils 2 times daily as needed for rhinitis, Disp: 30 mL, Rfl: 3  Immunization History   Administered Date(s) Administered     COVID-19 Monovalent 12+ (Pfizer 2022) 01/25/2022     COVID-19 Vaccine (Raheem) 05/14/2021     Influenza Vaccine 18-64 (Flublok) 10/10/2023     Influenza Vaccine >6 months,quad, PF 09/22/2016, 09/20/2017, 09/10/2018     Mantoux Tuberculin Skin Test 02/12/2014     Pneumococcal 23 valent 04/13/2022     TDAP (Adacel,Boostrix) 01/25/2022     TDAP Vaccine (Adacel) 06/12/2009     Allergies   Allergen Reactions     Animal Dander      Azithromycin      Zithromax - Racing heart     Birch Trees      Dust Mites      Moxifloxacin Hydrochloride      Avelox - Labored breathing     Pollen Extract      OBJECTIVE:                                                                 /80   Pulse 82   Wt 85.6 kg (188 lb 11.4 oz)   LMP 07/29/2015 (Exact Date)   SpO2 97%   BMI 28.69 kg/m          Physical Exam  Vitals and nursing note reviewed.   Constitutional:        General: She is not in acute distress.     Appearance: She is not ill-appearing, toxic-appearing or diaphoretic.   HENT:      Head: Normocephalic and atraumatic.      Right Ear: Tympanic membrane, ear canal and external ear normal.      Left Ear: Tympanic membrane, ear canal and external ear normal.      Nose: No congestion or rhinorrhea.      Right Turbinates: Not enlarged, swollen or pale.      Left Turbinates: Not enlarged, swollen or pale.      Mouth/Throat:      Lips: Pink.      Mouth: Mucous membranes are moist.      Pharynx: Oropharynx is clear. No pharyngeal swelling, oropharyngeal exudate, posterior oropharyngeal erythema or uvula swelling.   Eyes:      General:         Right eye: No discharge.         Left eye: No discharge.      Conjunctiva/sclera: Conjunctivae normal.   Cardiovascular:      Rate and Rhythm: Normal rate and regular rhythm.      Heart sounds: Normal heart sounds.   Pulmonary:      Effort: Pulmonary effort is normal. No respiratory distress.      Breath sounds: Normal breath sounds and air entry. No stridor, decreased air movement or transmitted upper airway sounds. No decreased breath sounds, wheezing, rhonchi or rales.   Neurological:      Mental Status: She is alert and oriented to person, place, and time.   Psychiatric:         Mood and Affect: Mood normal.         Behavior: Behavior normal.             ASSESSMENT/PLAN:    Moderate persistent asthma without complication    Well-controlled with AirDuo 232/14 mcg 1 puff once-twice daily and albuterol as needed.  - Continue as is.    - fluticasone-salmeterol (AIRDUO RESPICLICK) 232-14 MCG/ACT inhaler  Dispense: 1 each; Refill: 11    Allergic rhinitis    Well-controlled with fexofenadine as needed.  Has not needed azelastine nasal spray for a while.  - Continue as is.      Follow-up in 1 year or sooner if needed.    Thank you for allowing us to participate in the care of this patient. Please feel free to contact us if there are any  questions or concerns about the patient.    Disclaimer: This note consists of symbols derived from keyboarding, dictation and/or voice recognition software. As a result, there may be errors in the script that have gone undetected. Please consider this when interpreting information found in this chart.    Clifton Trejo MD, FAAAAI, FACAAI  Allergy, Asthma and Immunology     [facility]        Again, thank you for allowing me to participate in the care of your patient.        Sincerely,        Clifton Trejo MD

## 2024-03-12 NOTE — PROGRESS NOTES
SUBJECTIVE:                                                                   Neeta Verde presents today to our Allergy Clinic at [facility] for a follow up visit. She is a 54 year old female with asthma and allergic rhinoconjunctivitis.  History of asthma manifested by chest tightness, shortness of breath, and cough triggered by cold air and exposure to pets.  She also has a history of cough due to the postnasal drainage.  Pulmonary function test done in 2023 did not suggest an obstruction.  No postbronchodilator response was noted.    History of itchy/watery eyes and postnasal drainage in Spring and Fall.  May develop nasal congestion with cat exposure.  SPT for aeroallergens performed in 2021, suggest sensitivity to cat, dog, dust mites, grass pollen (Bc), tree pollen, and weed pollen.     Allergic rhinoconjunctivitis symptoms are well-controlled with fexofenadine as needed.  She has not needed nasal sprays for a while.   Neeta denies persistent clear rhinorrhea, nasal itch, stuffiness, or sneezing.   Asthma is well-controlled with AirDuo 232/14 mcg 1 puff once-twice daily depending on symptoms.Neeta uses albuterol HFA  less than twice weekly for rescue from chest symptoms. she wakes up less than twice per month due to chest symptoms. There has been no use of oral steroids since the last visit. No ED/PCP/urgent care/other specialist visits for asthma flare since the previous visit. The patient denies current chest tightness, cough, wheezing, or shortness of breath.   Asthma triggers remain unchanged. He uses a spacer/chamber device, where applicable.      Patient Active Problem List   Diagnosis    Major depressive disorder, recurrent episode, moderate with mood-congruent psychotic features (H)    Psychosis, brief reactive (H)    CARDIOVASCULAR SCREENING; LDL GOAL LESS THAN 160    ASCUS of cervix with negative high risk HPV    Moderate persistent asthma without complication     Allergic rhinitis due to animal dander    Allergic rhinitis due to dust mite    Seasonal allergic rhinitis due to pollen    Suicidal ideation    Post traumatic stress disorder       Past Medical History:   Diagnosis Date    ASCUS favor benign 2015    Neg HPV    Depressive disorder     Moderate persistent asthma without complication     Uncomplicated asthma 20    Went to ER and followup with Allergy Doc      *Patient is Adopted           Problem (# of Occurrences) Relation (Name,Age of Onset)    Crohn's Disease (1) Son    Other - See Comments (1) Son: chron's    Allergy (Severe) (1) Son           Negative family history of: Unknown/Adopted, Asthma          Past Surgical History:   Procedure Laterality Date    ZZC APPENDECTOMY       Social History     Socioeconomic History    Marital status:      Spouse name: None    Number of children: None    Years of education: None    Highest education level: None   Occupational History     Comment: [employer]   Tobacco Use    Smoking status: Former     Packs/day: 0.00     Years: 2.00     Additional pack years: 0.00     Total pack years: 0.00     Types: Cigarettes     Start date: 1987     Quit date: 1989     Years since quittin.2    Smokeless tobacco: Never    Tobacco comments:     Social Use only   Vaping Use    Vaping Use: Never used   Substance and Sexual Activity    Alcohol use: Yes     Alcohol/week: 14.0 standard drinks of alcohol    Drug use: No    Sexual activity: Yes     Partners: Male     Birth control/protection: Post-menopausal   Other Topics Concern    Parent/sibling w/ CABG, MI or angioplasty before 65F 55M? No   Social History Narrative    2024        ENVIRONMENTAL HISTORY: [place] The home is heated with a forced air and gas furnace. They do have central air conditioning. The patient's bedroom is furnished with carpeting in bedroom and fabric window coverings.  Pets inside the house  include 1 dog(s). There is no history of cockroach or mice infestation. There is/are 0 smokers in the house.  The house does not have a damp basement.            Current Outpatient Medications:     albuterol (PROAIR HFA/PROVENTIL HFA/VENTOLIN HFA) 108 (90 Base) MCG/ACT inhaler, Inhale 2 puffs into the lungs every 4 hours as needed for wheezing, Disp: 18 g, Rfl: 5    escitalopram (LEXAPRO) 20 MG tablet, Take 1 tablet (20 mg) by mouth daily, Disp: 90 tablet, Rfl: 3    fluticasone-salmeterol (AIRDUO RESPICLICK) 232-14 MCG/ACT inhaler, Inhale 1 puff into the lungs 2 times daily, Disp: 1 each, Rfl: 11    azelastine (ASTELIN) 0.1 % nasal spray, Spray 2 sprays into both nostrils 2 times daily as needed for rhinitis, Disp: 30 mL, Rfl: 3  Immunization History   Administered Date(s) Administered    COVID-19 Monovalent 12+ (Pfizer 2022) 01/25/2022    COVID-19 Vaccine (Raheem) 05/14/2021    Influenza Vaccine 18-64 (Flublok) 10/10/2023    Influenza Vaccine >6 months,quad, PF 09/22/2016, 09/20/2017, 09/10/2018    Mantoux Tuberculin Skin Test 02/12/2014    Pneumococcal 23 valent 04/13/2022    TDAP (Adacel,Boostrix) 01/25/2022    TDAP Vaccine (Adacel) 06/12/2009     Allergies   Allergen Reactions    Animal Dander     Azithromycin      Zithromax - Racing heart    Birch Trees     Dust Mites     Moxifloxacin Hydrochloride      Avelox - Labored breathing    Pollen Extract      OBJECTIVE:                                                                 /80   Pulse 82   Wt 85.6 kg (188 lb 11.4 oz)   LMP 07/29/2015 (Exact Date)   SpO2 97%   BMI 28.69 kg/m          Physical Exam  Vitals and nursing note reviewed.   Constitutional:       General: She is not in acute distress.     Appearance: She is not ill-appearing, toxic-appearing or diaphoretic.   HENT:      Head: Normocephalic and atraumatic.      Right Ear: Tympanic membrane, ear canal and external ear normal.      Left Ear: Tympanic membrane, ear canal and external ear  normal.      Nose: No congestion or rhinorrhea.      Right Turbinates: Not enlarged, swollen or pale.      Left Turbinates: Not enlarged, swollen or pale.      Mouth/Throat:      Lips: Pink.      Mouth: Mucous membranes are moist.      Pharynx: Oropharynx is clear. No pharyngeal swelling, oropharyngeal exudate, posterior oropharyngeal erythema or uvula swelling.   Eyes:      General:         Right eye: No discharge.         Left eye: No discharge.      Conjunctiva/sclera: Conjunctivae normal.   Cardiovascular:      Rate and Rhythm: Normal rate and regular rhythm.      Heart sounds: Normal heart sounds.   Pulmonary:      Effort: Pulmonary effort is normal. No respiratory distress.      Breath sounds: Normal breath sounds and air entry. No stridor, decreased air movement or transmitted upper airway sounds. No decreased breath sounds, wheezing, rhonchi or rales.   Neurological:      Mental Status: She is alert and oriented to person, place, and time.   Psychiatric:         Mood and Affect: Mood normal.         Behavior: Behavior normal.             ASSESSMENT/PLAN:    Moderate persistent asthma without complication    Well-controlled with AirDuo 232/14 mcg 1 puff once-twice daily and albuterol as needed.  - Continue as is.    - fluticasone-salmeterol (AIRDUO RESPICLICK) 232-14 MCG/ACT inhaler  Dispense: 1 each; Refill: 11    Allergic rhinitis    Well-controlled with fexofenadine as needed.  Has not needed azelastine nasal spray for a while.  - Continue as is.      Follow-up in 1 year or sooner if needed.    Thank you for allowing us to participate in the care of this patient. Please feel free to contact us if there are any questions or concerns about the patient.    Disclaimer: This note consists of symbols derived from keyboarding, dictation and/or voice recognition software. As a result, there may be errors in the script that have gone undetected. Please consider this when interpreting information found in this  chart.    Clifton Trejo MD, FAAAAI, FACAAI  Allergy, Asthma and Immunology     [facility]

## 2024-05-25 ENCOUNTER — HEALTH MAINTENANCE LETTER (OUTPATIENT)
Age: 54
End: 2024-05-25

## 2024-12-05 ENCOUNTER — OFFICE VISIT (OUTPATIENT)
Dept: INTERNAL MEDICINE | Facility: CLINIC | Age: 54
End: 2024-12-05
Payer: COMMERCIAL

## 2024-12-05 ENCOUNTER — OFFICE VISIT (OUTPATIENT)
Dept: ORTHOPEDICS | Facility: CLINIC | Age: 54
End: 2024-12-05
Payer: COMMERCIAL

## 2024-12-05 ENCOUNTER — ANCILLARY PROCEDURE (OUTPATIENT)
Dept: GENERAL RADIOLOGY | Facility: CLINIC | Age: 54
End: 2024-12-05
Payer: COMMERCIAL

## 2024-12-05 VITALS
WEIGHT: 191 LBS | HEIGHT: 68 IN | TEMPERATURE: 97.9 F | SYSTOLIC BLOOD PRESSURE: 130 MMHG | OXYGEN SATURATION: 100 % | DIASTOLIC BLOOD PRESSURE: 80 MMHG | RESPIRATION RATE: 16 BRPM | HEART RATE: 86 BPM | BODY MASS INDEX: 28.95 KG/M2

## 2024-12-05 DIAGNOSIS — S99.922A FOOT INJURY, LEFT, INITIAL ENCOUNTER: ICD-10-CM

## 2024-12-05 DIAGNOSIS — S93.602A SPRAIN OF LEFT FOOT, INITIAL ENCOUNTER: Primary | ICD-10-CM

## 2024-12-05 DIAGNOSIS — S99.922A FOOT INJURY, LEFT, INITIAL ENCOUNTER: Primary | ICD-10-CM

## 2024-12-05 RX ORDER — ARIPIPRAZOLE 5 MG/1
5 TABLET ORAL DAILY
COMMUNITY

## 2024-12-05 ASSESSMENT — PATIENT HEALTH QUESTIONNAIRE - PHQ9
SUM OF ALL RESPONSES TO PHQ QUESTIONS 1-9: 0
SUM OF ALL RESPONSES TO PHQ QUESTIONS 1-9: 0
10. IF YOU CHECKED OFF ANY PROBLEMS, HOW DIFFICULT HAVE THESE PROBLEMS MADE IT FOR YOU TO DO YOUR WORK, TAKE CARE OF THINGS AT HOME, OR GET ALONG WITH OTHER PEOPLE: NOT DIFFICULT AT ALL

## 2024-12-05 ASSESSMENT — ASTHMA QUESTIONNAIRES
QUESTION_4 LAST FOUR WEEKS HOW OFTEN HAVE YOU USED YOUR RESCUE INHALER OR NEBULIZER MEDICATION (SUCH AS ALBUTEROL): ONCE A WEEK OR LESS
QUESTION_1 LAST FOUR WEEKS HOW MUCH OF THE TIME DID YOUR ASTHMA KEEP YOU FROM GETTING AS MUCH DONE AT WORK, SCHOOL OR AT HOME: NONE OF THE TIME
ACT_TOTALSCORE: 22
QUESTION_2 LAST FOUR WEEKS HOW OFTEN HAVE YOU HAD SHORTNESS OF BREATH: NOT AT ALL
QUESTION_5 LAST FOUR WEEKS HOW WOULD YOU RATE YOUR ASTHMA CONTROL: WELL CONTROLLED
QUESTION_3 LAST FOUR WEEKS HOW OFTEN DID YOUR ASTHMA SYMPTOMS (WHEEZING, COUGHING, SHORTNESS OF BREATH, CHEST TIGHTNESS OR PAIN) WAKE YOU UP AT NIGHT OR EARLIER THAN USUAL IN THE MORNING: ONCE OR TWICE
ACT_TOTALSCORE: 22

## 2024-12-05 NOTE — PROGRESS NOTES
No vitals were taken during visit as she was walked down from [facility].  April St Lazaro CMA 12/5/2024 2:52 PM

## 2024-12-05 NOTE — PROGRESS NOTES
"  Assessment & Plan     (S99.922A) Foot injury, left, initial encounter  (primary encounter diagnosis)  Comment: Patient seen in clinic today for injury to left foot.  Noted on exam that foot was swollen noted redness patient is unable to bear full weight on foot.  Foot was wrapped Ace bandage.  Discussed getting foot x-rays.  Will also check with Ortho to see if they are able to get patient into clinic today.  Will determine if patient is dealing with foot fracture.  Patient thought downstairs to x-ray.  Patient was able to be seen by Ortho.  Ortho determined there was a fracture to the cuboid bone and patient was given boot to wear.  Plan: REVIEW OF HEALTH MAINTENANCE PROTOCOL ORDERS,         XR Foot Left G/E 3 Views,           I spent a total of 40 minutes on the day of the visit.   Time spent by me today doing chart review, history and exam, documentation and further activities per the note          BMI  Estimated body mass index is 29.04 kg/m  as calculated from the following:    Height as of this encounter: 1.727 m (5' 8\").    Weight as of this encounter: 86.6 kg (191 lb).         CONSULTATION/REFERRAL to Ortho    Subjective   Neeta is a 54 year old, presenting for the following health issues:  Foot Pain        12/5/2024     1:46 PM   Additional Questions   Roomed by Rona HEREDIA     History of Present Illness       Reason for visit:  Injury to foot  Symptom onset:  1-3 days ago  Symptoms include:  Left foot is swollen and sore to the touch and hurts to walk on it  Symptom intensity:  Severe  Symptom progression:  Staying the same  Had these symptoms before:  No  What makes it worse:  Walking on it  What makes it better:  No   She is taking medications regularly.                 Review of Systems  Constitutional, HEENT, cardiovascular, pulmonary, gi and gu systems are negative, except as otherwise noted.      Objective    LMP 07/29/2015 (Exact Date)   There is no height or weight on file to calculate " BMI.  Physical Exam  Constitutional:       Appearance: Normal appearance.   HENT:      Head: Normocephalic.   Eyes:      General:         Right eye: No discharge.         Left eye: No discharge.      Pupils: Pupils are equal, round, and reactive to light.   Pulmonary:      Effort: Pulmonary effort is normal. No respiratory distress.      Breath sounds: Normal breath sounds. No stridor. No wheezing or rhonchi.   Musculoskeletal:         General: Swelling, tenderness and signs of injury present.      Left lower leg: Edema present.   Skin:     General: Skin is warm.      Coloration: Skin is not jaundiced.      Findings: Erythema present. No bruising.   Neurological:      General: No focal deficit present.      Mental Status: She is alert and oriented to person, place, and time.   Psychiatric:         Mood and Affect: Mood normal.         Behavior: Behavior normal.         Thought Content: Thought content normal.         Judgment: Judgment normal.            Results for orders placed or performed in visit on 12/05/24   XR Foot Left G/E 3 Views     Status: None    Narrative    EXAM: XR FOOT LEFT G/E 3 VIEWS  DATE/TIME: 12/5/2024 2:46 PM    INDICATION: Had injury to foot concerned for fracture; Foot injury,  left, initial encounter  COMPARISON: None available.       Impression    IMPRESSION: Normal joint spaces and alignment. No acute fracture.       KARLY DEAN DO         SYSTEM ID:  [id number]           Signed Electronically by: AVERY Beasley CNP

## 2024-12-05 NOTE — PATIENT INSTRUCTIONS
Pablito Sport Ankle Sleeve with Wrap Support at [employer]     Stay off for 2 weeks     Soak foot Epsom salt   Ice foot as needed can take Ibuprofen to help with swelling acetaminophen for pain

## 2024-12-05 NOTE — PROGRESS NOTES
SUBJECTIVE:  Neeta Verde is a 54 year old female who is seen in consultation at the request of Gloria Vasquez for  left foot pain.  Mechanism of injury: her chocolate lab stepped on her foot and she twisted it.  Date of injury, if known: 2 days ago.    Treatment to this point:  ACE wrap.    Present symptoms: pain, and swelling. Pain lateral midfoot-cuboid region.  Pain worsening?: no  Pain with any weight bearing     Prior history of related problems: no prior problems with this area in the past.    Patients past medical, surgical, social and family histories reviewed.    Past Medical History:   Diagnosis Date    ASCUS favor benign 2015    Neg HPV    Depressive disorder     Moderate persistent asthma without complication     Uncomplicated asthma 20    Went to ER and followup with Allergy Doc      Past Surgical History:   Procedure Laterality Date    Z APPENDECTOMY        Family History:   Family History   Adopted: Yes   Problem Relation Age of Onset    Other - See Comments Son         chron's    Crohn's Disease Son     Allergy (Severe) Son     Unknown/Adopted No family hx of         Unknown family history     Asthma No family hx of      Social History:   Social History     Tobacco Use    Smoking status: Former     Current packs/day: 0.00     Types: Cigarettes     Start date: 1987     Quit date: 1989     Years since quittin.9    Smokeless tobacco: Never    Tobacco comments:     Social Use only   Substance Use Topics    Alcohol use: Yes     Alcohol/week: 14.0 standard drinks of alcohol       REVIEW OF SYSTEMS:  Review of Systems:  Constitutional:  NEGATIVE for fever, chills, change in weight  Integumentary/Skin:  NEGATIVE for worrisome rashes, moles or lesions  Eyes:  NEGATIVE for vision changes or irritation  ENT/Mouth:  NEGATIVE for ear, mouth and throat problems  Resp:  NEGATIVE for significant cough or SOB  Breast:  NEGATIVE for masses, tenderness or discharge  CV:  NEGATIVE for  "chest pain, palpitations or peripheral edema  GI:  NEGATIVE for nausea, abdominal pain, heartburn, or change in bowel habits  :  Negative   Musculoskeletal:  See HPI above  Neuro:  NEGATIVE for weakness, dizziness or paresthesias  Endocrine:  NEGATIVE for temperature intolerance, skin/hair changes  Heme/allergy/immune:  NEGATIVE for bleeding problems  Psychiatric:  NEGATIVE for changes in mood or affect    OBJECTIVE:  Physical Exam:  [facility] 07/29/2015 (Exact Date)   General Appearance: healthy, alert and no distress   Skin: no suspicious lesions or rashes  Neuro: Normal strength and tone, mentation intact and speech normal  Vascular: good pulses, and capillary refill   Lymph: no lymphadenopathy   Psych:  mentation appears normal and affect normal/bright  Resp: no increased work of breathing     MUSCULOSKELETAL:  RIGHT FOOT :   Inspection: swelling over cuboid region    Tenderness:  over cuboid region  Mild redness no excessive warmth    CMSI     X-RAY INTERPRETATION   Xrays from today, of the left foot, reviewed with the patient today: lateral cuboid shows some calcification adjacent to the cortex, likely a small avulsion fracture based on the correlation with her exam. Xrays read as \"normal\" by radiology    ASSESSMENT/PLAN  Midfoot sprain likely subtle avulsion fracture of the cuboid.    Plan:   Fracture boot given.  Wear as needed for comfort, particularly with weight bearing.  I expect 4-6 week recovery  Can wean boot when able.  Can remove boot at rest  Ice, elevate, ACE as needed     Return to clinic 4-6 weeks or as needed     CAROLINE De Leon MD  Dept. Orthopedic Surgery  [facility]   "

## 2024-12-21 ENCOUNTER — HEALTH MAINTENANCE LETTER (OUTPATIENT)
Age: 54
End: 2024-12-21

## 2025-04-02 DIAGNOSIS — J45.40 MODERATE PERSISTENT ASTHMA WITHOUT COMPLICATION: ICD-10-CM

## 2025-04-02 RX ORDER — FLUTICASONE PROPIONATE AND SALMETEROL 232; 14 UG/1; UG/1
1 POWDER, METERED RESPIRATORY (INHALATION) 2 TIMES DAILY
Qty: 1 EACH | Refills: 0 | Status: SHIPPED | OUTPATIENT
Start: 2025-04-02

## 2025-04-02 NOTE — TELEPHONE ENCOUNTER
Pending Prescriptions:                       Disp   Refills    fluticasone-salmeterol (AIRDUO RESPICLICK*1 each 11           Sig: Inhale 1 puff into the lungs 2 times daily.    Routing refill request to provider for review/approval because:  Patient needs to be seen because it has been more than 1 year since last office visit.    LOV 3/12/24.  No upcoming appt.    Jackie Pritchard MSN, RN   Specialty Clinic, 4/2/2025 11:57 AM

## 2025-04-02 NOTE — TELEPHONE ENCOUNTER
One refill provided.  The patient needs to be seen for further refills. She likely uses [facility], and may need it printed put.     Clifton Trejo MD

## 2025-05-07 DIAGNOSIS — J45.40 MODERATE PERSISTENT ASTHMA WITHOUT COMPLICATION: ICD-10-CM

## 2025-05-07 RX ORDER — FLUTICASONE PROPIONATE AND SALMETEROL 232; 14 UG/1; UG/1
1 POWDER, METERED RESPIRATORY (INHALATION) 2 TIMES DAILY
Qty: 1 EACH | Refills: 0 | OUTPATIENT
Start: 2025-05-07

## 2025-05-07 NOTE — TELEPHONE ENCOUNTER
Refused Prescriptions:                       Disp   Refills    fluticasone-salmeterol (AIRDUO RESPICLICK)*1 each 0        Sig: Inhale 1 puff into the lungs 2 times daily.  Refused By: TAVO PRITCHARD  Reason for Refusal: Patient needs appointment    Tavo Pritchard MSN, RN   [facility]ty [facility], 5/7/2025 10:11 AM

## 2025-05-12 ENCOUNTER — TELEPHONE (OUTPATIENT)
Dept: ALLERGY | Facility: OTHER | Age: 55
End: 2025-05-12
Payer: COMMERCIAL

## 2025-05-12 DIAGNOSIS — J45.40 MODERATE PERSISTENT ASTHMA WITHOUT COMPLICATION: ICD-10-CM

## 2025-05-12 RX ORDER — FLUTICASONE PROPIONATE AND SALMETEROL 232; 14 UG/1; UG/1
1 POWDER, METERED RESPIRATORY (INHALATION) 2 TIMES DAILY
Qty: 1 EACH | Refills: 0 | OUTPATIENT
Start: 2025-05-12

## 2025-05-12 NOTE — TELEPHONE ENCOUNTER
Refused Prescriptions:                       Disp   Refills    fluticasone-salmeterol (AIRDUO RESPICLICK)*1 each 0        Sig: Inhale 1 puff into the lungs 2 times daily.  Refused By: TAVO PRITCHARD  Reason for Refusal: Patient needs appointment    Refills for this patient have been denied as she is overdue for an appt.  She has been notified of the need for an appt already.    Tavo Pritchard MSN, RN   [facility], 5/12/2025 9:44 AM

## 2025-05-12 NOTE — TELEPHONE ENCOUNTER
[facility] Call Center    Phone Message    May a detailed message be left on voicemail: no     Reason for Call: Medication Question or concern regarding medication   Prescription Clarification  Name of Medication: Simbacort,  fluticasone-salmeterol (AIRDUO RESPICLICK   Prescribing Provider:    Pharmacy: [id number] [place], [place] - [address] AT [facility] [address] (Pharmacy)     What on the order needs clarification? Pharmacy is calling to see if these refills were waiting to be filled.       Action Taken: Message routed to:  Other: ER allergy    Travel Screening: Not Applicable     Date of Service:

## 2025-05-21 NOTE — PATIENT INSTRUCTIONS
Dr Trejo [phone]    All visits for food challenges, venom allergy testing, and medication/drug challenges MUST be scheduled through the allergy clinic nurse. Please send a [employer] message or call the allergy scheduling line and ask to speak with Dr Trejo's team for scheduling these appointments. Appointments for these visits that are made through the schedulers or via [employer] may be cancelled or rescheduled.    Allergy Shot Scheduling ([place]): [phone]    Pulmonary Function [phone]    Prescription Assistance  If you need assistance with your prescriptions (cost, coverage, etc) please contact: [facility] [phone]

## 2025-05-21 NOTE — PROGRESS NOTES
SUBJECTIVE:                                                                   Neeta Verde presents today to our Allergy Clinic at [facility] for a follow up visit. She is a 55 year old female with allergic rhinoconjunctivitis and asthma.  History of asthma manifested by chest tightness, shortness of breath, and cough triggered by cold air and exposure to pets.  She also has a history of cough due to the postnasal drainage.  Pulmonary function test done in 2023 did not suggest an obstruction.  No postbronchodilator response was noted.     History of itchy/watery eyes and postnasal drainage in Spring and Fall.  May develop nasal congestion with cat exposure.  SPT for aeroallergens performed in 2021, suggest sensitivity to cat, dog, dust mites, grass pollen (Bc), tree pollen, and weed pollen.         Neeta reports that her asthma has been generally well-controlled. She uses AirDuo 232/14 mcg, 1 puff once daily, occasionally increasing to twice daily as needed. 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 She uses her albuterol inhaler approximately twice a month.    She has not required oral corticosteroids since her last visit, and there have been no asthma-related visits to the ED, PCP, urgent care, or other specialists. She denies current symptoms of chest tightness, cough, wheezing, or shortness of breath.    Regarding allergies, she rarely needs to take Allegra. She does not use nasal sprays and states that her allergies have not been problematic recently. She denies uncontrolled nasal congestion, rhinorrhea, sneezing, or postnasal drainage.      Patient Active Problem List   Diagnosis    Major depressive disorder, recurrent episode, moderate with mood-congruent psychotic features (H)    Psychosis, brief reactive (H)    CARDIOVASCULAR SCREENING; LDL GOAL LESS THAN  160    ASCUS of cervix with negative high risk HPV    Moderate persistent asthma without complication    Allergic rhinitis due to animal dander    Allergic rhinitis due to dust mite    Seasonal allergic rhinitis due to pollen    Suicidal ideation    Post traumatic stress disorder       Past Medical History:   Diagnosis Date    ASCUS favor benign 2015    Neg HPV    Depressive disorder     Moderate persistent asthma without complication     Uncomplicated asthma 20    Went to ER and followup with Allergy Doc      *Patient is Adopted           Problem (# of Occurrences) Relation (Name,Age of Onset)    Crohn's Disease (1) Son    Other - See Comments (1) Son: chron's    Allergy (Severe) (1) Son           Negative family history of: Unknown/Adopted, Asthma          Past Surgical History:   Procedure Laterality Date    ZZC APPENDECTOMY       Social History     Socioeconomic History    Marital status:      Spouse name: None    Number of children: None    Years of education: None    Highest education level: None   Occupational History     Comment: [employer]   Tobacco Use    Smoking status: Former     Current packs/day: 0.00     Types: Cigarettes     Start date: 1987     Quit date: 1989     Years since quittin.4    Smokeless tobacco: Never    Tobacco comments:     Social Use only   Vaping Use    Vaping status: Never Used   Substance and Sexual Activity    Alcohol use: Yes     Alcohol/week: 14.0 standard drinks of alcohol    Drug use: No    Sexual activity: Yes     Partners: Male     Birth control/protection: Post-menopausal   Other Topics Concern    Parent/sibling w/ CABG, MI or angioplasty before 65F 55M? No   Social History Narrative    May 27, 2025        ENVIRONMENTAL HISTORY: [place] The home is heated with a forced air and gas furnace. They do have central air conditioning. The patient's bedroom is furnished with carpeting in bedroom and fabric window  coverings.  Pets inside the house include 1 dog(s). There is no history of cockroach or mice infestation. There is/are 0 smokers in the house.  The house does not have a damp basement.      Social Drivers of Health     Interpersonal Safety: Low Risk  (12/5/2024)    Interpersonal Safety     Do you feel physically and emotionally safe where you currently live?: Yes     Within the past 12 months, have you been hit, slapped, kicked or otherwise physically hurt by someone?: No     Within the past 12 months, have you been humiliated or emotionally abused in other ways by your partner or ex-partner?: No           Current Outpatient Medications:     albuterol (PROAIR HFA/PROVENTIL HFA/VENTOLIN HFA) 108 (90 Base) MCG/ACT inhaler, Inhale 2 puffs into the lungs every 4 hours as needed for wheezing., Disp: 18 g, Rfl: 5    ARIPiprazole (ABILIFY) 5 MG tablet, Take 5 mg by mouth daily., Disp: , Rfl:     escitalopram (LEXAPRO) 20 MG tablet, Take 1 tablet (20 mg) by mouth daily, Disp: 90 tablet, Rfl: 3    fluticasone-salmeterol (AIRDUO RESPICLICK) 113-14 MCG/ACT inhaler, Inhale 1 puff into the lungs 2 times daily., Disp: 1 each, Rfl: 11    azelastine (ASTELIN) 0.1 % nasal spray, Spray 2 sprays into both nostrils 2 times daily as needed for rhinitis (Patient not taking: Reported on 5/27/2025), Disp: 30 mL, Rfl: 3  Immunization History   Administered Date(s) Administered    COVID-19 Monovalent 12+ (Pfizer 2022) 01/25/2022    COVID-19 Vaccine (Raheem) 05/14/2021    Influenza Vaccine 18-64 (Flublok) 10/10/2023    Influenza Vaccine >6 months,quad, PF 09/22/2016, 09/20/2017, 09/10/2018    Mantoux Tuberculin Skin Test 02/12/2014    Pneumococcal 23 valent 04/13/2022    TDAP (Adacel,Boostrix) 01/25/2022    TDAP Vaccine (Adacel) 06/12/2009     Allergies   Allergen Reactions    Animal Dander     Azithromycin      Zithromax - Racing heart    Birch Trees     Dust Mites     Moxifloxacin Hydrochloride      Avelox - Labored breathing    Pollen  "Extract      OBJECTIVE:                                                                 /85   Pulse 63   Ht 1.726 m (5' 7.95\")   Wt 85.5 kg (188 lb 7.9 oz)   LMP 07/29/2015 (Exact Date)   SpO2 98%   BMI 28.70 kg/m          Physical Exam  Vitals and nursing note reviewed.   Constitutional:       General: She is not in acute distress.     Appearance: She is not ill-appearing, toxic-appearing or diaphoretic.   HENT:      Head: Normocephalic and atraumatic.      Right Ear: Tympanic membrane, ear canal and external ear normal.      Left Ear: Tympanic membrane, ear canal and external ear normal.      Nose: No congestion or rhinorrhea.      Right Turbinates: Not enlarged, swollen or pale.      Left Turbinates: Not enlarged, swollen or pale.      Mouth/Throat:      Lips: Pink.      Mouth: Mucous membranes are moist.      Pharynx: Oropharynx is clear. No pharyngeal swelling, oropharyngeal exudate, posterior oropharyngeal erythema or uvula swelling.   Eyes:      General:         Right eye: No discharge.         Left eye: No discharge.      Conjunctiva/sclera: Conjunctivae normal.   Cardiovascular:      Rate and Rhythm: Normal rate and regular rhythm.      Heart sounds: Normal heart sounds.   Pulmonary:      Effort: Pulmonary effort is normal. No respiratory distress.      Breath sounds: Normal breath sounds and air entry. No stridor, decreased air movement or transmitted upper airway sounds. No decreased breath sounds, wheezing, rhonchi or rales.   Neurological:      Mental Status: She is alert and oriented to person, place, and time.   Psychiatric:         Mood and Affect: Mood normal.         Behavior: Behavior normal.                    WORKUP:           My interpretation:The office spirometry performed today doesn't suggest an obstruction.     ASSESSMENT/PLAN:         Moderate persistent asthma without complication  Well-controlled.  Agreed to decrease the concentration of inhaled steroid in her air duo, to " 113/14 mcg 1 puff once-twice daily.  She will try initially to use it twice daily.  Will reevaluate in 3 months.  - Continue albuterol as needed.    - General PFT Lab (Please always keep checked)  - Spirometry, Breathing Capacity  - fluticasone-salmeterol (AIRDUO RESPICLICK) 113-14 MCG/ACT inhaler  Dispense: 1 each; Refill: 11  - albuterol (PROAIR HFA/PROVENTIL HFA/VENTOLIN HFA) 108 (90 Base) MCG/ACT inhaler  Dispense: 18 g; Refill: 5    Allergic rhinitis  Well-controlled with fexofenadine as needed.  - Continue as is.      Follow-up in 3 months or sooner if needed.    Thank you for allowing us to participate in the care of this patient. Please feel free to contact us if there are any questions or concerns about the patient.    Disclaimer: This note consists of symbols derived from keyboarding, dictation and/or voice recognition software. As a result, there may be errors in the script that have gone undetected. Please consider this when interpreting information found in this chart.    Consent was obtained from the patient to use an AI documentation tool in the creation of this note.     Clifton Trejo MD, FAAAAI, FACAAI  Allergy, Asthma and Immunology     [facility]

## 2025-05-27 ENCOUNTER — OFFICE VISIT (OUTPATIENT)
Dept: ALLERGY | Facility: OTHER | Age: 55
End: 2025-05-27
Payer: COMMERCIAL

## 2025-05-27 VITALS
OXYGEN SATURATION: 98 % | SYSTOLIC BLOOD PRESSURE: 129 MMHG | WEIGHT: 188.49 LBS | HEART RATE: 63 BPM | DIASTOLIC BLOOD PRESSURE: 85 MMHG | BODY MASS INDEX: 28.57 KG/M2 | HEIGHT: 68 IN

## 2025-05-27 DIAGNOSIS — J45.40 MODERATE PERSISTENT ASTHMA WITHOUT COMPLICATION: Primary | ICD-10-CM

## 2025-05-27 DIAGNOSIS — J30.89 ALLERGIC RHINITIS DUE TO DUST MITE: ICD-10-CM

## 2025-05-27 DIAGNOSIS — J30.81 ALLERGIC RHINITIS DUE TO ANIMAL DANDER: ICD-10-CM

## 2025-05-27 DIAGNOSIS — J30.1 SEASONAL ALLERGIC RHINITIS DUE TO POLLEN: ICD-10-CM

## 2025-05-27 LAB
EXPTIME-PRE: 6.97 SEC
FEF2575-%PRED-PRE: 104 %
FEF2575-PRE: 2.58 L/SEC
FEF2575-PRED: 2.47 L/SEC
FEFMAX-%PRED-PRE: 76 %
FEFMAX-PRE: 5.43 L/SEC
FEFMAX-PRED: 7.06 L/SEC
FEV1-%PRED-PRE: 110 %
FEV1-PRE: 3.01 L
FEV1FEV6-PRE: 77 %
FEV1FEV6-PRED: 81 %
FEV1FVC-PRE: 76 %
FEV1FVC-PRED: 80 %
FIFMAX-PRE: 3.07 L/SEC
FVC-%PRED-PRE: 114 %
FVC-PRE: 3.93 L
FVC-PRED: 3.43 L

## 2025-05-27 PROCEDURE — 3074F SYST BP LT 130 MM HG: CPT | Performed by: ALLERGY & IMMUNOLOGY

## 2025-05-27 PROCEDURE — 94010 BREATHING CAPACITY TEST: CPT | Performed by: ALLERGY & IMMUNOLOGY

## 2025-05-27 PROCEDURE — 3079F DIAST BP 80-89 MM HG: CPT | Performed by: ALLERGY & IMMUNOLOGY

## 2025-05-27 PROCEDURE — 99214 OFFICE O/P EST MOD 30 MIN: CPT | Mod: 25 | Performed by: ALLERGY & IMMUNOLOGY

## 2025-05-27 RX ORDER — ALBUTEROL SULFATE 90 UG/1
2 INHALANT RESPIRATORY (INHALATION) EVERY 4 HOURS PRN
Qty: 18 G | Refills: 5 | Status: SHIPPED | OUTPATIENT
Start: 2025-05-27

## 2025-05-27 RX ORDER — FLUTICASONE PROPIONATE AND SALMETEROL 113; 14 UG/1; UG/1
1 POWDER, METERED RESPIRATORY (INHALATION) 2 TIMES DAILY
Qty: 1 EACH | Refills: 11 | Status: CANCELLED | OUTPATIENT
Start: 2025-05-27

## 2025-05-27 RX ORDER — FLUTICASONE PROPIONATE AND SALMETEROL 113; 14 UG/1; UG/1
1 POWDER, METERED RESPIRATORY (INHALATION) 2 TIMES DAILY
Qty: 1 EACH | Refills: 11 | Status: SHIPPED | OUTPATIENT
Start: 2025-05-27

## 2025-05-27 RX ORDER — FLUTICASONE PROPIONATE AND SALMETEROL 113; 14 UG/1; UG/1
1 POWDER, METERED RESPIRATORY (INHALATION) 2 TIMES DAILY
Qty: 1 EACH | Refills: 11 | Status: SHIPPED | OUTPATIENT
Start: 2025-05-27 | End: 2025-05-27

## 2025-05-27 ASSESSMENT — ASTHMA QUESTIONNAIRES: ACT_TOTALSCORE: 22

## 2025-05-27 NOTE — LETTER
5/27/2025      Neeta Verde  [address]  [place] [address]      Dear Colleague,    Thank you for referring your patient, Neeta Verde, to the [facility]. Please see a copy of my visit note below.    SUBJECTIVE:                                                                   Neeta Verde presents today to our Allergy Clinic at [facility] for a follow up visit. She is a 55 year old female with allergic rhinoconjunctivitis and asthma.  History of asthma manifested by chest tightness, shortness of breath, and cough triggered by cold air and exposure to pets.  She also has a history of cough due to the postnasal drainage.  Pulmonary function test done in 2023 did not suggest an obstruction.  No postbronchodilator response was noted.     History of itchy/watery eyes and postnasal drainage in Spring and Fall.  May develop nasal congestion with cat exposure.  SPT for aeroallergens performed in 2021, suggest sensitivity to cat, dog, dust mites, grass pollen (Bc), tree pollen, and weed pollen.         Neeta reports that her asthma has been generally well-controlled. She uses AirDuo 232/14 mcg, 1 puff once daily, occasionally increasing to twice daily as needed. 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 She uses her albuterol inhaler approximately twice a month.    She has not required oral corticosteroids since her last visit, and there have been no asthma-related visits to the ED, PCP, urgent care, or other specialists. She denies current symptoms of chest tightness, cough, wheezing, or shortness of breath.    Regarding allergies, she rarely needs to take Allegra. She does not use nasal sprays and states that her allergies have not been problematic recently. She denies uncontrolled nasal congestion, rhinorrhea, sneezing, or postnasal  drainage.      Patient Active Problem List   Diagnosis     Major depressive disorder, recurrent episode, moderate with mood-congruent psychotic features (H)     Psychosis, brief reactive (H)     CARDIOVASCULAR SCREENING; LDL GOAL LESS THAN 160     ASCUS of cervix with negative high risk HPV     Moderate persistent asthma without complication     Allergic rhinitis due to animal dander     Allergic rhinitis due to dust mite     Seasonal allergic rhinitis due to pollen     Suicidal ideation     Post traumatic stress disorder       Past Medical History:   Diagnosis Date     ASCUS favor benign 2015    Neg HPV     Depressive disorder      Moderate persistent asthma without complication      Uncomplicated asthma 20    Went to ER and followup with Allergy Doc      *Patient is Adopted           Problem (# of Occurrences) Relation (Name,Age of Onset)    Crohn's Disease (1) Son    Other - See Comments (1) Son: chron's    Allergy (Severe) (1) Son           Negative family history of: Unknown/Adopted, Asthma          Past Surgical History:   Procedure Laterality Date     ZZC APPENDECTOMY       Social History     Socioeconomic History     Marital status:      Spouse name: None     Number of children: None     Years of education: None     Highest education level: None   Occupational History     Comment: [employer]   Tobacco Use     Smoking status: Former     Current packs/day: 0.00     Types: Cigarettes     Start date: 1987     Quit date: 1989     Years since quittin.4     Smokeless tobacco: Never     Tobacco comments:     Social Use only   Vaping Use     Vaping status: Never Used   Substance and Sexual Activity     Alcohol use: Yes     Alcohol/week: 14.0 standard drinks of alcohol     Drug use: No     Sexual activity: Yes     Partners: Male     Birth control/protection: Post-menopausal   Other Topics Concern     Parent/sibling w/ CABG, MI or angioplasty before 65F 55M? No   Social History Narrative     May 27, 2025        ENVIRONMENTAL HISTORY: [place] The home is heated with a forced air and gas furnace. They do have central air conditioning. The patient's bedroom is furnished with carpeting in bedroom and fabric window coverings.  Pets inside the house include 1 dog(s). There is no history of cockroach or mice infestation. There is/are 0 smokers in the house.  The house does not have a damp basement.      Social Drivers of Health     Interpersonal Safety: Low Risk  (12/5/2024)    Interpersonal Safety      Do you feel physically and emotionally safe where you currently live?: Yes      Within the past 12 months, have you been hit, slapped, kicked or otherwise physically hurt by someone?: No      Within the past 12 months, have you been humiliated or emotionally abused in other ways by your partner or ex-partner?: No           Current Outpatient Medications:      albuterol (PROAIR HFA/PROVENTIL HFA/VENTOLIN HFA) 108 (90 Base) MCG/ACT inhaler, Inhale 2 puffs into the lungs every 4 hours as needed for wheezing., Disp: 18 g, Rfl: 5     ARIPiprazole (ABILIFY) 5 MG tablet, Take 5 mg by mouth daily., Disp: , Rfl:      escitalopram (LEXAPRO) 20 MG tablet, Take 1 tablet (20 mg) by mouth daily, Disp: 90 tablet, Rfl: 3     fluticasone-salmeterol (AIRDUO RESPICLICK) 113-14 MCG/ACT inhaler, Inhale 1 puff into the lungs 2 times daily., Disp: 1 each, Rfl: 11     azelastine (ASTELIN) 0.1 % nasal spray, Spray 2 sprays into both nostrils 2 times daily as needed for rhinitis (Patient not taking: Reported on 5/27/2025), Disp: 30 mL, Rfl: 3  Immunization History   Administered Date(s) Administered     COVID-19 Monovalent 12+ (Pfizer 2022) 01/25/2022     COVID-19 Vaccine (Raheem) 05/14/2021     Influenza Vaccine 18-64 (Flublok) 10/10/2023     Influenza Vaccine >6 months,quad, PF 09/22/2016, 09/20/2017, 09/10/2018     Mantoux Tuberculin Skin Test 02/12/2014     Pneumococcal 23 valent  "04/13/2022     TDAP (Adacel,Boostrix) 01/25/2022     TDAP Vaccine (Adacel) 06/12/2009     Allergies   Allergen Reactions     Animal Dander      Azithromycin      Zithromax - Racing heart     Birch Trees      Dust Mites      Moxifloxacin Hydrochloride      Avelox - Labored breathing     Pollen Extract      OBJECTIVE:                                                                 /85   Pulse 63   Ht 1.726 m (5' 7.95\")   Wt 85.5 kg (188 lb 7.9 oz)   LMP 07/29/2015 (Exact Date)   SpO2 98%   BMI 28.70 kg/m          Physical Exam  Vitals and nursing note reviewed.   Constitutional:       General: She is not in acute distress.     Appearance: She is not ill-appearing, toxic-appearing or diaphoretic.   HENT:      Head: Normocephalic and atraumatic.      Right Ear: Tympanic membrane, ear canal and external ear normal.      Left Ear: Tympanic membrane, ear canal and external ear normal.      Nose: No congestion or rhinorrhea.      Right Turbinates: Not enlarged, swollen or pale.      Left Turbinates: Not enlarged, swollen or pale.      Mouth/Throat:      Lips: Pink.      Mouth: Mucous membranes are moist.      Pharynx: Oropharynx is clear. No pharyngeal swelling, oropharyngeal exudate, posterior oropharyngeal erythema or uvula swelling.   Eyes:      General:         Right eye: No discharge.         Left eye: No discharge.      Conjunctiva/sclera: Conjunctivae normal.   Cardiovascular:      Rate and Rhythm: Normal rate and regular rhythm.      Heart sounds: Normal heart sounds.   Pulmonary:      Effort: Pulmonary effort is normal. No respiratory distress.      Breath sounds: Normal breath sounds and air entry. No stridor, decreased air movement or transmitted upper airway sounds. No decreased breath sounds, wheezing, rhonchi or rales.   Neurological:      Mental Status: She is alert and oriented to person, place, and time.   Psychiatric:         Mood and Affect: Mood normal.         Behavior: Behavior normal. "                    WORKUP:           My interpretation:The office spirometry performed today doesn't suggest an obstruction.     ASSESSMENT/PLAN:         Moderate persistent asthma without complication  Well-controlled.  Agreed to decrease the concentration of inhaled steroid in her air duo, to 113/14 mcg 1 puff once-twice daily.  She will try initially to use it twice daily.  Will reevaluate in 3 months.  - Continue albuterol as needed.    - General PFT Lab (Please always keep checked)  - Spirometry, Breathing Capacity  - fluticasone-salmeterol (AIRDUO RESPICLICK) 113-14 MCG/ACT inhaler  Dispense: 1 each; Refill: 11  - albuterol (PROAIR HFA/PROVENTIL HFA/VENTOLIN HFA) 108 (90 Base) MCG/ACT inhaler  Dispense: 18 g; Refill: 5    Allergic rhinitis  Well-controlled with fexofenadine as needed.  - Continue as is.      Follow-up in 3 months or sooner if needed.    Thank you for allowing us to participate in the care of this patient. Please feel free to contact us if there are any questions or concerns about the patient.    Disclaimer: This note consists of symbols derived from keyboarding, dictation and/or voice recognition software. As a result, there may be errors in the script that have gone undetected. Please consider this when interpreting information found in this chart.    Consent was obtained from the patient to use an AI documentation tool in the creation of this note.     Clifton Trejo MD, FAAAAI, FACAAI  Allergy, Asthma and Immunology     [facility]      Again, thank you for allowing me to participate in the care of your patient.        Sincerely,        Clifton Trejo MD    Electronically signed

## 2025-06-05 PROBLEM — R45.851 SUICIDAL IDEATION: Status: RESOLVED | Noted: 2022-08-26 | Resolved: 2025-06-05

## 2025-06-10 ENCOUNTER — ANCILLARY PROCEDURE (OUTPATIENT)
Dept: MAMMOGRAPHY | Facility: OTHER | Age: 55
End: 2025-06-10
Attending: PHYSICIAN ASSISTANT
Payer: COMMERCIAL

## 2025-06-10 DIAGNOSIS — Z12.31 VISIT FOR SCREENING MAMMOGRAM: ICD-10-CM

## 2025-06-10 PROCEDURE — 77067 SCR MAMMO BI INCL CAD: CPT | Mod: TC | Performed by: RADIOLOGY

## 2025-06-10 PROCEDURE — 77063 BREAST TOMOSYNTHESIS BI: CPT | Mod: TC | Performed by: RADIOLOGY

## 2025-06-30 SDOH — HEALTH STABILITY: PHYSICAL HEALTH: ON AVERAGE, HOW MANY MINUTES DO YOU ENGAGE IN EXERCISE AT THIS LEVEL?: 30 MIN

## 2025-06-30 SDOH — HEALTH STABILITY: PHYSICAL HEALTH: ON AVERAGE, HOW MANY DAYS PER WEEK DO YOU ENGAGE IN MODERATE TO STRENUOUS EXERCISE (LIKE A BRISK WALK)?: 4 DAYS

## 2025-06-30 ASSESSMENT — PATIENT HEALTH QUESTIONNAIRE - PHQ9
10. IF YOU CHECKED OFF ANY PROBLEMS, HOW DIFFICULT HAVE THESE PROBLEMS MADE IT FOR YOU TO DO YOUR WORK, TAKE CARE OF THINGS AT HOME, OR GET ALONG WITH OTHER PEOPLE: NOT DIFFICULT AT ALL
SUM OF ALL RESPONSES TO PHQ QUESTIONS 1-9: 0
SUM OF ALL RESPONSES TO PHQ QUESTIONS 1-9: 0

## 2025-06-30 ASSESSMENT — SOCIAL DETERMINANTS OF HEALTH (SDOH): HOW OFTEN DO YOU GET TOGETHER WITH FRIENDS OR RELATIVES?: ONCE A WEEK

## 2025-07-01 ENCOUNTER — OFFICE VISIT (OUTPATIENT)
Dept: FAMILY MEDICINE | Facility: CLINIC | Age: 55
End: 2025-07-01
Payer: COMMERCIAL

## 2025-07-01 VITALS
OXYGEN SATURATION: 100 % | RESPIRATION RATE: 16 BRPM | HEART RATE: 79 BPM | WEIGHT: 182 LBS | HEIGHT: 68 IN | SYSTOLIC BLOOD PRESSURE: 136 MMHG | TEMPERATURE: 97.4 F | DIASTOLIC BLOOD PRESSURE: 87 MMHG | BODY MASS INDEX: 27.58 KG/M2

## 2025-07-01 DIAGNOSIS — Z00.00 ROUTINE GENERAL MEDICAL EXAMINATION AT A HEALTH CARE FACILITY: Primary | ICD-10-CM

## 2025-07-01 DIAGNOSIS — F32.3 MAJOR DEPRESSIVE DISORDER, SINGLE EPISODE, SEVERE WITH PSYCHOTIC FEATURES (H): ICD-10-CM

## 2025-07-01 DIAGNOSIS — Z12.11 SCREEN FOR COLON CANCER: ICD-10-CM

## 2025-07-01 DIAGNOSIS — Z11.59 NEED FOR HEPATITIS C SCREENING TEST: ICD-10-CM

## 2025-07-01 DIAGNOSIS — Z78.0 ASYMPTOMATIC MENOPAUSAL STATE: ICD-10-CM

## 2025-07-01 DIAGNOSIS — F23: ICD-10-CM

## 2025-07-01 DIAGNOSIS — Z12.4 CERVICAL CANCER SCREENING: ICD-10-CM

## 2025-07-01 PROCEDURE — 3075F SYST BP GE 130 - 139MM HG: CPT | Performed by: PHYSICIAN ASSISTANT

## 2025-07-01 PROCEDURE — 3079F DIAST BP 80-89 MM HG: CPT | Performed by: PHYSICIAN ASSISTANT

## 2025-07-01 PROCEDURE — 90471 IMMUNIZATION ADMIN: CPT | Performed by: PHYSICIAN ASSISTANT

## 2025-07-01 PROCEDURE — 99396 PREV VISIT EST AGE 40-64: CPT | Mod: 25 | Performed by: PHYSICIAN ASSISTANT

## 2025-07-01 PROCEDURE — 1126F AMNT PAIN NOTED NONE PRSNT: CPT | Performed by: PHYSICIAN ASSISTANT

## 2025-07-01 PROCEDURE — 90677 PCV20 VACCINE IM: CPT | Performed by: PHYSICIAN ASSISTANT

## 2025-07-01 PROCEDURE — 87624 HPV HI-RISK TYP POOLED RSLT: CPT | Performed by: PHYSICIAN ASSISTANT

## 2025-07-01 ASSESSMENT — PAIN SCALES - GENERAL: PAINLEVEL_OUTOF10: NO PAIN (0)

## 2025-07-01 NOTE — PROGRESS NOTES
"Preventive Care Visit  [facility] [place] Kristen M. Kehr, PA-C, Family Medicine  Jul 1, 2025      Assessment & Plan     Routine general medical examination at a health care facility  Health maintenance reviewed and updated.  - Lipid panel reflex to direct LDL Fasting; Future  - Basic metabolic panel  (Ca, Cl, CO2, Creat, Gluc, K, Na, BUN); Future    Asymptomatic menopausal state  Discussed baseline DEXA scan and calcium / Vitamin D recommendations.   Consider adding fosamax if indicated.   - DX Bone Density; Future    Cervical cancer screening  - HPV and Gynecologic Cytology Panel - Recommended Age 30 - 65 Years    Screen for colon cancer  - Colonoscopy Screening  Referral; Future    Need for hepatitis C screening test  - Hepatitis C Screen Reflex to HCV RNA Quant and Genotype; Future          BMI  Estimated body mass index is 27.88 kg/m  as calculated from the following:    Height as of this encounter: 1.721 m (5' 7.75\").    Weight as of this encounter: 82.6 kg (182 lb).   Weight management plan: Discussed healthy diet and exercise guidelines    Counseling  Appropriate preventive services were addressed with this patient via screening, questionnaire, or discussion as appropriate for fall prevention, nutrition, physical activity, Tobacco-use cessation, social engagement, weight loss and cognition.  Checklist reviewing preventive services available has been given to the patient.  Reviewed patient's diet, addressing concerns and/or questions.   The patient reports drinking more than 3 alcoholic drinks per day and/or more than 7 drhnks per week. The patient was counseled and given information about possible harmful effects of excessive alcohol intake.          Artie Santacruz is a 55 year old, presenting for the following:  Physical        7/1/2025    12:11 PM   Additional Questions   Roomed by Ad HERNANDEZ  Neeta is here today for preventative care.     She continues treatment " for mental health with Psychiatry and doing well.   She also is established with Allergist for management of allergies and doing well.            Advance Care Planning    Patient states has Health Care Directive and will send to [facility].        6/30/2025   General Health   How would you rate your overall physical health? Good   Feel stress (tense, anxious, or unable to sleep) Not at all         6/30/2025   Nutrition   Three or more servings of calcium each day? Yes   Diet: Regular (no restrictions)   How many servings of fruit and vegetables per day? (!) 2-3   How many sweetened beverages each day? 0-1         6/30/2025   Exercise   Days per week of moderate/strenous exercise 4 days   Average minutes spent exercising at this level 30 min         6/30/2025   Social Factors   Frequency of gathering with friends or relatives Once a week   Worry food won't last until get money to buy more No   Food not last or not have enough money for food? No   Do you have housing? (Housing is defined as stable permanent housing and does not include staying outside in a car, in a tent, in an abandoned building, in an overnight shelter, or couch-surfing.) Yes   Are you worried about losing your housing? No   Lack of transportation? No   Unable to get utilities (heat,electricity)? No         6/30/2025   Fall Risk   Fallen 2 or more times in the past year? No   Trouble with walking or balance? No          6/30/2025   Dental   Dentist two times every year? Yes       Today's PHQ-9 Score:       6/30/2025     2:53 PM   PHQ-9 SCORE   PHQ-9 Total Score MyChart 0   PHQ-9 Total Score 0        Patient-reported         6/30/2025   Substance Use   Alcohol more than 3/day or more than 7/wk Yes   How often do you have a drink containing alcohol 4 or more times a week   How many alcohol drinks on typical day 3 or 4   How often do you have 5+ drinks at one occasion Never   Audit 2/3 Score 1   How often not able to stop drinking once started  Never   How often failed to do what normally expected Never   How often needed first drink in am after a heavy drinking session Never   How often feeling of guilt or remorse after drinking Less than monthly   How often unable to remember what happened the night before Less than monthly   Have you or someone else been injured because of your drinking No   Has anyone been concerned or suggested you cut down on drinking Yes, but not in the last year   TOTAL SCORE - AUDIT 9   Do you use any other substances recreationally? No     Social History     Tobacco Use    Smoking status: Former     Current packs/day: 0.00     Types: Cigarettes     Start date: 1987     Quit date: 1989     Years since quittin.5    Smokeless tobacco: Never    Tobacco comments:     Social Use only   Vaping Use    Vaping status: Never Used   Substance Use Topics    Alcohol use: Yes     Alcohol/week: 14.0 standard drinks of alcohol    Drug use: No           6/10/2025   LAST FHS-7 RESULTS   1st degree relative breast or ovarian cancer No   Any relative bilateral breast cancer No   Any male have breast cancer No   Any ONE woman have BOTH breast AND ovarian cancer No   Any woman with breast cancer before 50yrs No   2 or more relatives with breast AND/OR ovarian cancer No   2 or more relatives with breast AND/OR bowel cancer No        Mammogram Screening - Mammogram every 1-2 years updated in Health Maintenance based on mutual decision making          2025   One time HIV Screening   Previous HIV test? Yes         2025   STI Screening   New sexual partner(s) since last STI/HIV test? No     History of abnormal Pap smear: No - age 30- 64 PAP with HPV every 5 years recommended        Latest Ref Rng & Units 9/10/2018     4:45 PM 9/10/2018     4:43 PM 2015     2:25 PM   PAP / HPV   PAP (Historical)   NIL     HPV 16 DNA NEG^Negative Negative   Negative    HPV 18 DNA NEG^Negative Negative   Negative    Other HR HPV NEG^Negative  Negative   Negative      ASCVD Risk   The 10-year ASCVD risk score (Elina REYES, et al., 2019) is: 2.4%    Values used to calculate the score:      Age: 55 years      Sex: Female      Is Non- : No      Diabetic: No      Tobacco smoker: No      Systolic Blood Pressure: 136 mmHg      Is BP treated: No      HDL Cholesterol: 79 mg/dL      Total Cholesterol: 238 mg/dL           Reviewed and updated as needed this visit by Provider                    Past Medical History:   Diagnosis Date    ASCUS favor benign 2015    Neg HPV    Depressive disorder     Moderate persistent asthma without complication     Uncomplicated asthma 20    Went to ER and followup with Allergy Doc     Past Surgical History:   Procedure Laterality Date    Z APPENDECTOMY       OB History   No obstetric history on file.     BP Readings from Last 3 Encounters:   25 136/87   25 129/85   24 130/80    Wt Readings from Last 3 Encounters:   25 82.6 kg (182 lb)   25 85.5 kg (188 lb 7.9 oz)   24 86.6 kg (191 lb)                  Patient Active Problem List   Diagnosis    Major depressive disorder, recurrent episode, moderate with mood-congruent psychotic features (H)    Psychosis, brief reactive (H)    ASCUS of cervix with negative high risk HPV    Moderate persistent asthma without complication    Allergic rhinitis due to animal dander    Allergic rhinitis due to dust mite    Seasonal allergic rhinitis due to pollen    Post traumatic stress disorder     Past Surgical History:   Procedure Laterality Date    [facility] APPENDECTOMY         Social History     Tobacco Use    Smoking status: Former     Current packs/day: 0.00     Types: Cigarettes     Start date: 1987     Quit date: 1989     Years since quittin.5    Smokeless tobacco: Never    Tobacco comments:     Social Use only   Substance Use Topics    Alcohol use: Yes     Alcohol/week: 14.0 standard drinks of alcohol      "Family History   Adopted: Yes   Problem Relation Age of Onset    Other - See Comments Son         chron's    Crohn's Disease Son     Allergy (Severe) Son     Unknown/Adopted No family hx of         Unknown family history     Asthma No family hx of          Current Outpatient Medications   Medication Sig Dispense Refill    albuterol (PROAIR HFA/PROVENTIL HFA/VENTOLIN HFA) 108 (90 Base) MCG/ACT inhaler Inhale 2 puffs into the lungs every 4 hours as needed for wheezing. 18 g 5    ARIPiprazole (ABILIFY) 5 MG tablet Take 5 mg by mouth daily.      azelastine (ASTELIN) 0.1 % nasal spray Spray 2 sprays into both nostrils 2 times daily as needed for rhinitis 30 mL 3    escitalopram (LEXAPRO) 20 MG tablet Take 1 tablet (20 mg) by mouth daily 90 tablet 3    fluticasone-salmeterol (AIRDUO RESPICLICK) 113-14 MCG/ACT inhaler Inhale 1 puff into the lungs 2 times daily. 1 each 11     Allergies   Allergen Reactions    Animal Dander     Azithromycin      Zithromax - Racing heart    Birch Trees     Dust Mites     Moxifloxacin Hydrochloride      Avelox - Labored breathing    Pollen Extract      Recent Labs   Lab Test 02/02/24  0704 08/26/22  1345 08/24/22  0910 08/24/22  0910 01/25/22  1140 12/30/20  1136 09/10/18  1701   *  --   --   --  111*  --  109*   HDL 79  --   --   --  90  --  85   TRIG 86  --   --   --  81  --  62   ALT 25 36  --   --   --   --   --    CR 0.96* 0.72   < > 0.67  --  0.70  --    GFRESTIMATED 70 >90   < > >90  --  >90  --    GFRESTBLACK  --   --   --   --   --  >90  --    POTASSIUM 4.4 3.5   < > 3.9  --  4.1  --    TSH  --   --   --  2.63  --   --   --     < > = values in this interval not displayed.               Review of Systems  Constitutional, HEENT, cardiovascular, pulmonary, GI, , musculoskeletal, neuro, skin, endocrine and psych systems are negative, except as otherwise noted.     Objective    Exam  /87   Pulse 79   Temp 97.4  F (36.3  C) (Tympanic)   Resp 16   Ht 1.721 m (5' 7.75\")   " "Wt 82.6 kg (182 lb)   LMP 07/29/2015 (Exact Date)   SpO2 100%   Breastfeeding No   BMI 27.88 kg/m     Estimated body mass index is 27.88 kg/m  as calculated from the following:    Height as of this encounter: 1.721 m (5' 7.75\").    Weight as of this encounter: 82.6 kg (182 lb).    Physical Exam  GENERAL: alert and no distress  EYES: Eyes grossly normal to inspection, PERRL and conjunctivae and sclerae normal  HENT: ear canals and TM's normal, nose and mouth without ulcers or lesions  NECK: no adenopathy, no asymmetry, masses, or scars  RESP: lungs clear to auscultation - no rales, rhonchi or wheezes  BREAST: normal without masses, tenderness or nipple discharge and no palpable axillary masses or adenopathy  CV: regular rate and rhythm, normal S1 S2, no S3 or S4, no murmur, click or rub, no peripheral edema  ABDOMEN: soft, nontender, no hepatosplenomegaly, no masses and bowel sounds normal   (female) w/bimanual: normal female external genitalia, normal urethral meatus, normal vaginal mucosa, and normal cervix/adnexa/uterus without masses or discharge  MS: no gross musculoskeletal defects noted, no edema  SKIN: no suspicious lesions or rashes  NEURO: Normal strength and tone, mentation intact and speech normal  PSYCH: mentation appears normal, affect normal/bright        Signed Electronically by: Kristen M. Kehr, PA-C    "

## 2025-07-01 NOTE — PROGRESS NOTES
"  {PROVIDER CHARTING PREFERENCE:562041}    Subjective   Neeta is a 55 year old, presenting for the following health issues:  Medication Request (Discuss medications for post menopausal )      7/1/2025    12:05 PM   Additional Questions   Roomed by Ad VINES MA     HPI      Discuss medications for menopausal     {MA/LPN/RN Pre-Provider Visit Orders- hCG/UA/Strep (Optional):002447}  {SUPERLIST (Optional):765281}  {additonal problems for provider to add (Optional):699508}    {ROS Picklists (Optional):363378}      Objective    /87   Pulse 79   Temp 97.4  F (36.3  C) (Tympanic)   Resp 16   Ht 1.721 m (5' 7.75\")   Wt 82.6 kg (182 lb)   LMP 07/29/2015 (Exact Date)   SpO2 100%   Breastfeeding No   BMI 27.88 kg/m    Body mass index is 27.88 kg/m .  Physical Exam   {Exam List (Optional):506214}    {Diagnostic Test Results (Optional):284105}        Signed Electronically by: Kristen M. Kehr, PA-C  {Email feedback regarding this note to [email or web address]   :504888}  "

## 2025-07-01 NOTE — PATIENT INSTRUCTIONS
Get 7998-1855 mg of calcium and 800-1000 IU vitamin D daily through diet and supplements. Dairy products and calcium fortified foods generally have 300 mg calcium and 100 IU vitamin D. Recommended supplements are calcium carbonate or citrate and vitamin D3 (cholecalciferol). For optimal absorption, take no more than 600 mg of calcium per dose.         Schedule fasting lab appointment    Schedule DEXA (bone density)     Schedule colonoscopy              Patient Education   Preventive Care Advice   This is general advice given by our system to help you stay healthy. However, your care team may have specific advice just for you. Please talk to your care team about your preventive care needs.  Nutrition  Eat 5 or more servings of fruits and vegetables each day.  Try wheat bread, brown rice and whole grain pasta (instead of white bread, rice, and pasta).  Get enough calcium and vitamin D. Check the label on foods and aim for 100% of the RDA (recommended daily allowance).  Lifestyle  Exercise at least 150 minutes each week  (30 minutes a day, 5 days a week).  Do muscle strengthening activities 2 days a week. These help control your weight and prevent disease.  No smoking.  Wear sunscreen to prevent skin cancer.  Have a dental exam and cleaning every 6 months.  Yearly exams  See your health care team every year to talk about:  Any changes in your health.  Any medicines your care team has prescribed.  Preventive care, family planning, and ways to prevent chronic diseases.  Shots (vaccines)   HPV shots (up to age 26), if you've never had them before.  Hepatitis B shots (up to age 59), if you've never had them before.  COVID-19 shot: Get this shot when it's due.  Flu shot: Get a flu shot every year.  Tetanus shot: Get a tetanus shot every 10 years.  Pneumococcal, hepatitis A, and RSV shots: Ask your care team if you need these based on your risk.  Shingles shot (for age 50 and up)  General health tests  Diabetes  screening:  Starting at age 35, Get screened for diabetes at least every 3 years.  If you are younger than age 35, ask your care team if you should be screened for diabetes.  Cholesterol test: At age 39, start having a cholesterol test every 5 years, or more often if advised.  Bone density scan (DEXA): At age 50, ask your care team if you should have this scan for osteoporosis (brittle bones).  Hepatitis C: Get tested at least once in your life.  STIs (sexually transmitted infections)  Before age 24: Ask your care team if you should be screened for STIs.  After age 24: Get screened for STIs if you're at risk. You are at risk for STIs (including HIV) if:  You are sexually active with more than one person.  You don't use condoms every time.  You or a partner was diagnosed with a sexually transmitted infection.  If you are at risk for HIV, ask about PrEP medicine to prevent HIV.  Get tested for HIV at least once in your life, whether you are at risk for HIV or not.  Cancer screening tests  Cervical cancer screening: If you have a cervix, begin getting regular cervical cancer screening tests starting at age 21.  Breast cancer scan (mammogram): If you've ever had breasts, begin having regular mammograms starting at age 40. This is a scan to check for breast cancer.  Colon cancer screening: It is important to start screening for colon cancer at age 45.  Have a colonoscopy test every 10 years (or more often if you're at risk) Or, ask your provider about stool tests like a FIT test every year or Cologuard test every 3 years.  To learn more about your testing options, visit:   .  For help making a decision, visit:   https://bit.ly/tu29332.  Prostate cancer screening test: If you have a prostate, ask your care team if a prostate cancer screening test (PSA) at age 55 is right for you.  Lung cancer screening: If you are a current or former smoker ages 50 to 80, ask your care team if ongoing lung cancer screenings are right for  "you.  For informational purposes only. Not to replace the advice of your health care provider. Copyright   2023 [facility]. All rights reserved. Clinically reviewed by the [facility] Transitions Program. [employer] 220438 - REV 01/24.  9 Ways to Cut Back on Drinking  Maybe you've found yourself drinking more alcohol than you'd prefer. If you want to cut back, here are some ideas to try.    Think before you drink.  Do you really want a drink, or is it just a habit? If you're used to having a drink at a certain time, try doing something else then.     Look for substitutes.  Find some no-alcohol drinks that you enjoy, like flavored seltzer water, tea with honey, or tonic with a slice of lime. Or try alcohol-free beer or \"virgin\" cocktails (without the alcohol).     Drink more water.  Use water to quench your thirst. Drink a glass of water before you have any alcohol. Have another glass along with every drink or between drinks.     Shrink your drink.  For example, have a bottle of beer instead of a pint. Use a smaller glass for wine. Choose drinks with lower alcohol content (ABV%). Or use less liquor and more mixer in cocktails.     Slow down.  It's easy to drink quickly and without thinking about it. Pay attention, and make each drink last longer.     Do the math.  Total up how much you spend on alcohol each month. How much is that a year? If you cut back, what could you do with the money you save?     Take a break.  Choose a day or two each week when you won't drink at all. Notice how you feel on those days, physically and emotionally. How did you sleep? Do you feel better? Over time, add more break days.     Count calories.  Would you like to lose some weight? For some people that's a good motivator for cutting back. Figure out how many calories are in each drink. How many does that add up to in a day? In a week? In a month?     Practice saying no.  Be ready when someone offers you a drink. Try: " "\"Thanks, I've had enough.\" Or \"Thanks, but I'm cutting back.\" Or \"No, thanks. I feel better when I drink less.\"   Current as of: August 20, 2024  Content Version: 14.5 2024-2025 [employer].   Care instructions adapted under license by your healthcare professional. If you have questions about a medical condition or this instruction, always ask your healthcare professional. [employer] disclaims any warranty or liability for your use of this information.     "

## 2025-07-02 LAB
HPV HR 12 DNA CVX QL NAA+PROBE: NEGATIVE
HPV16 DNA CVX QL NAA+PROBE: NEGATIVE
HPV18 DNA CVX QL NAA+PROBE: NEGATIVE
HUMAN PAPILLOMA VIRUS FINAL DIAGNOSIS: NORMAL

## 2025-07-03 ENCOUNTER — RESULTS FOLLOW-UP (OUTPATIENT)
Dept: OBGYN | Facility: CLINIC | Age: 55
End: 2025-07-03

## 2025-07-07 LAB
BKR AP ASSOCIATED HPV REPORT: NORMAL
BKR LAB AP GYN ADEQUACY: NORMAL
BKR LAB AP GYN INTERPRETATION: NORMAL
BKR LAB AP PREVIOUS ABNORMAL: NORMAL
PATH REPORT.COMMENTS IMP SPEC: NORMAL
PATH REPORT.COMMENTS IMP SPEC: NORMAL
PATH REPORT.RELEVANT HX SPEC: NORMAL

## 2025-07-22 ENCOUNTER — LAB (OUTPATIENT)
Dept: LAB | Facility: CLINIC | Age: 55
End: 2025-07-22
Payer: COMMERCIAL

## 2025-07-22 DIAGNOSIS — Z11.59 NEED FOR HEPATITIS C SCREENING TEST: ICD-10-CM

## 2025-07-22 DIAGNOSIS — Z00.00 ROUTINE GENERAL MEDICAL EXAMINATION AT A HEALTH CARE FACILITY: ICD-10-CM

## 2025-07-22 DIAGNOSIS — Z11.4 SCREENING FOR HIV (HUMAN IMMUNODEFICIENCY VIRUS): Primary | ICD-10-CM

## 2025-07-22 LAB
ANION GAP SERPL CALCULATED.3IONS-SCNC: 10 MMOL/L (ref 7–15)
BUN SERPL-MCNC: 20.3 MG/DL (ref 6–20)
CALCIUM SERPL-MCNC: 9.3 MG/DL (ref 8.8–10.4)
CHLORIDE SERPL-SCNC: 104 MMOL/L (ref 98–107)
CHOLEST SERPL-MCNC: 224 MG/DL
CREAT SERPL-MCNC: 1.07 MG/DL (ref 0.51–0.95)
EGFRCR SERPLBLD CKD-EPI 2021: 60 ML/MIN/1.73M2
FASTING STATUS PATIENT QL REPORTED: YES
FASTING STATUS PATIENT QL REPORTED: YES
GLUCOSE SERPL-MCNC: 97 MG/DL (ref 70–99)
HCO3 SERPL-SCNC: 26 MMOL/L (ref 22–29)
HCV AB SERPL QL IA: NONREACTIVE
HDLC SERPL-MCNC: 68 MG/DL
HIV 1+2 AB+HIV1 P24 AG SERPL QL IA: NONREACTIVE
LDLC SERPL CALC-MCNC: 133 MG/DL
NONHDLC SERPL-MCNC: 156 MG/DL
POTASSIUM SERPL-SCNC: 4.9 MMOL/L (ref 3.4–5.3)
SODIUM SERPL-SCNC: 140 MMOL/L (ref 135–145)
TRIGL SERPL-MCNC: 116 MG/DL

## 2025-07-22 PROCEDURE — 87389 HIV-1 AG W/HIV-1&-2 AB AG IA: CPT

## 2025-07-22 PROCEDURE — 36415 COLL VENOUS BLD VENIPUNCTURE: CPT

## 2025-07-22 PROCEDURE — 86803 HEPATITIS C AB TEST: CPT

## 2025-07-22 PROCEDURE — 80048 BASIC METABOLIC PNL TOTAL CA: CPT

## 2025-07-22 PROCEDURE — 80061 LIPID PANEL: CPT

## 2025-08-27 ENCOUNTER — OFFICE VISIT (OUTPATIENT)
Dept: ALLERGY | Facility: OTHER | Age: 55
End: 2025-08-27
Payer: COMMERCIAL

## 2025-08-27 VITALS
DIASTOLIC BLOOD PRESSURE: 82 MMHG | OXYGEN SATURATION: 100 % | HEIGHT: 68 IN | WEIGHT: 185.41 LBS | SYSTOLIC BLOOD PRESSURE: 124 MMHG | HEART RATE: 72 BPM | BODY MASS INDEX: 28.1 KG/M2

## 2025-08-27 DIAGNOSIS — J30.1 SEASONAL ALLERGIC RHINITIS DUE TO POLLEN: ICD-10-CM

## 2025-08-27 DIAGNOSIS — J30.81 ALLERGIC RHINITIS DUE TO ANIMAL DANDER: ICD-10-CM

## 2025-08-27 DIAGNOSIS — J45.40 MODERATE PERSISTENT ASTHMA WITHOUT COMPLICATION: Primary | ICD-10-CM

## 2025-08-27 LAB
EXPTIME-PRE: 8.05 SEC
FEF2575-%PRED-PRE: 97 %
FEF2575-PRE: 2.41 L/SEC
FEF2575-PRED: 2.46 L/SEC
FEFMAX-%PRED-PRE: 71 %
FEFMAX-PRE: 5.04 L/SEC
FEFMAX-PRED: 7.05 L/SEC
FEV1-%PRED-PRE: 108 %
FEV1-PRE: 3.07 L
FEV1FEV6-PRE: 80 %
FEV1FEV6-PRED: 81 %
FEV1FVC-PRE: 78 %
FEV1FVC-PRED: 79 %
FEV1SVC-PRED: 71 L
FIFMAX-PRE: 3.86 L/SEC
FVC-%PRED-PRE: 110 %
FVC-PRE: 3.93 L
FVC-PRED: 3.56 L
Lab: 98 %

## 2025-08-27 PROCEDURE — 3079F DIAST BP 80-89 MM HG: CPT | Performed by: ALLERGY & IMMUNOLOGY

## 2025-08-27 PROCEDURE — 99214 OFFICE O/P EST MOD 30 MIN: CPT | Mod: 25 | Performed by: ALLERGY & IMMUNOLOGY

## 2025-08-27 PROCEDURE — 3074F SYST BP LT 130 MM HG: CPT | Performed by: ALLERGY & IMMUNOLOGY

## 2025-08-27 PROCEDURE — 94010 BREATHING CAPACITY TEST: CPT | Performed by: ALLERGY & IMMUNOLOGY

## 2025-08-27 RX ORDER — FLUTICASONE PROPIONATE AND SALMETEROL 113; 14 UG/1; UG/1
1 POWDER, METERED RESPIRATORY (INHALATION) 2 TIMES DAILY
Qty: 1 EACH | Refills: 11 | Status: SHIPPED | OUTPATIENT
Start: 2025-08-27

## 2025-08-27 RX ORDER — AZELASTINE 1 MG/ML
2 SPRAY, METERED NASAL 2 TIMES DAILY PRN
Qty: 30 ML | Refills: 3 | Status: SHIPPED | OUTPATIENT
Start: 2025-08-27

## 2025-08-27 RX ORDER — ALBUTEROL SULFATE 90 UG/1
2 INHALANT RESPIRATORY (INHALATION) EVERY 4 HOURS PRN
Qty: 18 G | Refills: 5 | Status: SHIPPED | OUTPATIENT
Start: 2025-08-27

## 2025-08-27 ASSESSMENT — ASTHMA QUESTIONNAIRES: ACT_TOTALSCORE: 22
